# Patient Record
Sex: FEMALE | Race: WHITE | NOT HISPANIC OR LATINO | Employment: OTHER | URBAN - METROPOLITAN AREA
[De-identification: names, ages, dates, MRNs, and addresses within clinical notes are randomized per-mention and may not be internally consistent; named-entity substitution may affect disease eponyms.]

---

## 2018-10-05 ENCOUNTER — TELEPHONE (OUTPATIENT)
Dept: SURGERY | Facility: CLINIC | Age: 78
End: 2018-10-05

## 2018-10-09 ENCOUNTER — PREP FOR SURGERY (OUTPATIENT)
Dept: OTHER | Facility: HOSPITAL | Age: 78
End: 2018-10-09

## 2018-10-09 DIAGNOSIS — Z12.11 SCREENING FOR COLON CANCER: Primary | ICD-10-CM

## 2018-10-10 PROBLEM — Z12.11 SCREENING FOR COLON CANCER: Status: ACTIVE | Noted: 2018-10-10

## 2018-10-10 NOTE — TELEPHONE ENCOUNTER
SCHEDULED AT [facility] 12/21/2018 AT 9:45AM - ARRIVE 8:30AM.  WILL MAIL INSTRUCTIONS.

## 2018-10-14 ENCOUNTER — PREP FOR SURGERY (OUTPATIENT)
Dept: OTHER | Facility: HOSPITAL | Age: 78
End: 2018-10-14

## 2018-10-14 DIAGNOSIS — Z12.11 SCREENING FOR COLON CANCER: Primary | ICD-10-CM

## 2018-11-08 ENCOUNTER — ANESTHESIA EVENT (OUTPATIENT)
Dept: PERIOP | Facility: HOSPITAL | Age: 78
End: 2018-11-08

## 2018-11-08 RX ORDER — SIMVASTATIN 40 MG
40 TABLET ORAL NIGHTLY
COMMUNITY
End: 2023-03-20 | Stop reason: RX

## 2018-11-08 RX ORDER — RANITIDINE 150 MG/1
150 TABLET ORAL DAILY
COMMUNITY
End: 2022-12-09

## 2018-11-08 RX ORDER — CITALOPRAM 20 MG/1
20 TABLET ORAL DAILY
COMMUNITY

## 2018-11-08 RX ORDER — LISINOPRIL AND HYDROCHLOROTHIAZIDE 20; 12.5 MG/1; MG/1
1 TABLET ORAL DAILY
COMMUNITY
End: 2022-04-25 | Stop reason: HOSPADM

## 2018-11-09 ENCOUNTER — ANESTHESIA (OUTPATIENT)
Dept: PERIOP | Facility: HOSPITAL | Age: 78
End: 2018-11-09

## 2018-11-09 ENCOUNTER — HOSPITAL ENCOUNTER (OUTPATIENT)
Facility: HOSPITAL | Age: 78
Setting detail: HOSPITAL OUTPATIENT SURGERY
Discharge: HOME | End: 2018-11-09
Attending: INTERNAL MEDICINE | Admitting: INTERNAL MEDICINE

## 2018-11-09 VITALS
BODY MASS INDEX: 24.24 KG/M2 | TEMPERATURE: 97.5 F | RESPIRATION RATE: 15 BRPM | DIASTOLIC BLOOD PRESSURE: 70 MMHG | SYSTOLIC BLOOD PRESSURE: 128 MMHG | WEIGHT: 136.8 LBS | HEIGHT: 63 IN | HEART RATE: 62 BPM | OXYGEN SATURATION: 95 %

## 2018-11-09 DIAGNOSIS — Z12.11 SCREENING FOR COLON CANCER: ICD-10-CM

## 2018-11-09 LAB — POTASSIUM BLD-SCNC: 3.8 MMOL/L (ref 3.5–5.2)

## 2018-11-09 PROCEDURE — 45380 COLONOSCOPY AND BIOPSY: CPT | Performed by: INTERNAL MEDICINE

## 2018-11-09 PROCEDURE — 88305 TISSUE EXAM BY PATHOLOGIST: CPT | Performed by: INTERNAL MEDICINE

## 2018-11-09 PROCEDURE — 84132 ASSAY OF SERUM POTASSIUM: CPT | Performed by: INTERNAL MEDICINE

## 2018-11-09 PROCEDURE — 25010000002 PROPOFOL 10 MG/ML EMULSION: Performed by: NURSE ANESTHETIST, CERTIFIED REGISTERED

## 2018-11-09 RX ORDER — MAGNESIUM HYDROXIDE 1200 MG/15ML
LIQUID ORAL AS NEEDED
Status: DISCONTINUED | OUTPATIENT
Start: 2018-11-09 | End: 2018-11-09 | Stop reason: HOSPADM

## 2018-11-09 RX ORDER — SODIUM CHLORIDE 0.9 % (FLUSH) 0.9 %
1-10 SYRINGE (ML) INJECTION AS NEEDED
Status: DISCONTINUED | OUTPATIENT
Start: 2018-11-09 | End: 2018-11-09 | Stop reason: HOSPADM

## 2018-11-09 RX ORDER — LIDOCAINE HYDROCHLORIDE 20 MG/ML
INJECTION, SOLUTION INFILTRATION; PERINEURAL AS NEEDED
Status: DISCONTINUED | OUTPATIENT
Start: 2018-11-09 | End: 2018-11-09 | Stop reason: SURG

## 2018-11-09 RX ORDER — ONDANSETRON 2 MG/ML
4 INJECTION INTRAMUSCULAR; INTRAVENOUS ONCE AS NEEDED
Status: CANCELLED | OUTPATIENT
Start: 2018-11-09

## 2018-11-09 RX ORDER — SODIUM CHLORIDE 9 MG/ML
40 INJECTION, SOLUTION INTRAVENOUS AS NEEDED
Status: DISCONTINUED | OUTPATIENT
Start: 2018-11-09 | End: 2018-11-09 | Stop reason: HOSPADM

## 2018-11-09 RX ORDER — LIDOCAINE HYDROCHLORIDE 10 MG/ML
0.5 INJECTION, SOLUTION EPIDURAL; INFILTRATION; INTRACAUDAL; PERINEURAL ONCE AS NEEDED
Status: DISCONTINUED | OUTPATIENT
Start: 2018-11-09 | End: 2018-11-09 | Stop reason: HOSPADM

## 2018-11-09 RX ORDER — SODIUM CHLORIDE, SODIUM LACTATE, POTASSIUM CHLORIDE, CALCIUM CHLORIDE 600; 310; 30; 20 MG/100ML; MG/100ML; MG/100ML; MG/100ML
100 INJECTION, SOLUTION INTRAVENOUS CONTINUOUS
Status: CANCELLED | OUTPATIENT
Start: 2018-11-09

## 2018-11-09 RX ORDER — SODIUM CHLORIDE, SODIUM LACTATE, POTASSIUM CHLORIDE, CALCIUM CHLORIDE 600; 310; 30; 20 MG/100ML; MG/100ML; MG/100ML; MG/100ML
9 INJECTION, SOLUTION INTRAVENOUS CONTINUOUS
Status: DISCONTINUED | OUTPATIENT
Start: 2018-11-09 | End: 2018-11-09 | Stop reason: HOSPADM

## 2018-11-09 RX ORDER — PROPOFOL 10 MG/ML
VIAL (ML) INTRAVENOUS AS NEEDED
Status: DISCONTINUED | OUTPATIENT
Start: 2018-11-09 | End: 2018-11-09 | Stop reason: SURG

## 2018-11-09 RX ORDER — SODIUM CHLORIDE 0.9 % (FLUSH) 0.9 %
3 SYRINGE (ML) INJECTION EVERY 12 HOURS SCHEDULED
Status: DISCONTINUED | OUTPATIENT
Start: 2018-11-09 | End: 2018-11-09 | Stop reason: HOSPADM

## 2018-11-09 RX ADMIN — PROPOFOL 50 MG: 10 INJECTION, EMULSION INTRAVENOUS at 08:37

## 2018-11-09 RX ADMIN — PROPOFOL 50 MG: 10 INJECTION, EMULSION INTRAVENOUS at 08:46

## 2018-11-09 RX ADMIN — SODIUM CHLORIDE, POTASSIUM CHLORIDE, SODIUM LACTATE AND CALCIUM CHLORIDE: 600; 310; 30; 20 INJECTION, SOLUTION INTRAVENOUS at 08:31

## 2018-11-09 RX ADMIN — LIDOCAINE HYDROCHLORIDE 100 MG: 20 INJECTION, SOLUTION INFILTRATION; PERINEURAL at 08:32

## 2018-11-09 RX ADMIN — PROPOFOL 100 MG: 10 INJECTION, EMULSION INTRAVENOUS at 08:34

## 2018-11-09 RX ADMIN — PROPOFOL 50 MG: 10 INJECTION, EMULSION INTRAVENOUS at 08:40

## 2018-11-09 NOTE — BRIEF OP NOTE
COLONOSCOPY  Progress Note    Berta Garay  11/9/2018    Pre-op Diagnosis:   Screening for colon cancer [Z12.11]       Post-Op Diagnosis Codes:     * Screening for colon cancer [Z12.11]     * Colon polyp [K63.5]     * Diverticulosis [K57.90]    Procedure/CPT® Codes:      Procedure(s):  COLONOSCOPY w/ polypectomy    Surgeon(s):  Gilberto Stringer MD    Anesthesia: Monitor Anesthesia Care    Staff:   Circulator: Viviane Gillespie RN  Scrub Person: Kristin Mistry    Estimated Blood Loss: none    Urine Voided: * No values recorded between 11/9/2018  8:30 AM and 11/9/2018  8:58 AM *    Specimens:                ID Type Source Tests Collected by Time   A : cecal polyp Polyp Large Intestine, Cecum TISSUE PATHOLOGY EXAM Gilberto Stringer MD 11/9/2018 0849   B : ascending polyps X 2 Polyp Large Intestine, Right / Ascending Colon TISSUE PATHOLOGY EXAM Gilberto Stringer MD 11/9/2018 0850   C : descending polyp Polyp Large Intestine, Left / Descending Colon TISSUE PATHOLOGY EXAM Gilberto Stringer MD 11/9/2018 0853         Drains:      Findings: Colon to TI good Prep  Sigmoid Diverticulosis  Polyps (4) Cold Biopsy    Complications: None      Gilberto Stringer MD     Date: 11/9/2018  Time: 9:01 AM

## 2018-11-09 NOTE — OP NOTE
COLONOSCOPY  Procedure Report    Patient Name:  Berta Garay  YOB: 1940    Date of Surgery:  11/9/2018     Indications:  Screening for CRC    Pre-op Diagnosis:   Screening for colon cancer [Z12.11]    Post-Op Diagnosis Codes:     * Screening for colon cancer [Z12.11]     * Colon polyp [K63.5]     * Diverticulosis [K57.90]         Procedure/CPT® Codes:      Procedure(s):  COLONOSCOPY w/ polypectomy    Staff:  Surgeon(s):  Gilberto Stringer MD         Anesthesia: Monitor Anesthesia Care    Estimated Blood Loss: none    Specimens:   ID Type Source Tests Collected by Time   A : cecal polyp Polyp Large Intestine, Cecum TISSUE PATHOLOGY EXAM Gilberto Stringer MD 11/9/2018 0849   B : ascending polyps X 2 Polyp Large Intestine, Right / Ascending Colon TISSUE PATHOLOGY EXAM Gilberto Stringer MD 11/9/2018 0850   C : descending polyp Polyp Large Intestine, Left / Descending Colon TISSUE PATHOLOGY EXAM Gilberto Stringer MD 11/9/2018 0853       Implants:    Nothing was implanted during the procedure      Description of Procedure: After having signed informed consent, she was brought to the endoscopy suite and placed in left lateral decubitus position, given her IV sedation. Rectal exam revealed no external lesions, normal anal tone, no rectal mass. Scope was introduced into rectum and advanced under direct visualization through the rectum, sigmoid colon, descending colon, to and around the splenic flexure, reduced and advanced through transverse colon with minimal looping around the hepatic flexure but the scope was easily reduced in the ascending colon and then advanced using rotational technique into the cecum. Cecum was identified by appendiceal orifice and ileocecal valve. It was well prepped and normal appearing. The ileocecal valve was briefly intubated. Terminal ileum was normal appearing. Scope was withdrawn back in the ascending colon, across the ileocecal valve  with a sessile 4 x 4 mm polyp that was biopsied and sent as cecal polyp. Scope was then withdrawn into the ascending colon. There were two 3 mm polyps noted in the ascending colon, both biopsied, sent together as ascending colon polyps x2. Scope was withdrawn around the hepatic flexure through a normal appearing transverse colon, into the descending colon where a fourth 4 mm polyp was identified, biopsied, and sent separately as descending colon polyp. Scope was withdrawn through the sigmoid colon where there were scattered diverticular openings noted but no further mucosal lesions. Scope was withdrawn in the rectum, retroflexed revealing intact dentate line and no mucosal lesions. Scope was de-retroflexed and withdrawn. Patient tolerated the procedure very well.          Findings: Colon to TI good Prep  Sigmoid Diverticulosis  Polyps (4) Cold Biopsy        Complications: None    Recommendations: Results to be called      Gilberto Stringer MD     Date: 11/9/2018  Time: 9:04 AM

## 2018-11-09 NOTE — ANESTHESIA POSTPROCEDURE EVALUATION
Patient: Berta Garay    Procedure Summary     Date:  11/09/18 Room / Location:  [facility] ENDOSCOPY 1 /  [facility]    Anesthesia Start:  0831 Anesthesia Stop:  0901    Procedure:  COLONOSCOPY w/ polypectomy (N/A ) Diagnosis:       Screening for colon cancer      Colon polyp      Diverticulosis      (Screening for colon cancer [Z12.11])    Surgeon:  Gilberto Stringer MD Provider:  Neris Weiss CRNA    Anesthesia Type:  MAC ASA Status:  2          Anesthesia Type: MAC  Last vitals  BP   96/59 (11/09/18 0903)   Temp   97.5 °F (36.4 °C) (11/09/18 0903)   Pulse   60 (11/09/18 0903)   Resp   18 (11/09/18 0903)     SpO2         Post Anesthesia Care and Evaluation    Patient location during evaluation: bedside  Patient participation: complete - patient participated  Level of consciousness: awake and alert  Pain score: 0  Pain management: adequate  Airway patency: patent  Anesthetic complications: No anesthetic complications  PONV Status: none  Cardiovascular status: acceptable  Respiratory status: acceptable  Hydration status: acceptable

## 2018-11-09 NOTE — H&P
"Patient Care Team:  Josesito Lopez MD as PCP - General (Family Medicine)    CHIEF COMPLAINT: Screening for Colon Cancer    HISTORY OF PRESENT ILLNESS:    Last exam was 2008      Past Medical History:   Diagnosis Date   • GERD (gastroesophageal reflux disease)    • Hyperlipidemia    • Hypertension      Past Surgical History:   Procedure Laterality Date   • COLONOSCOPY     • ENDOSCOPY     • EYE SURGERY      Bilateral Cataracts     History reviewed. No pertinent family history.  Social History   Substance Use Topics   • Smoking status: Former Smoker     Quit date: 1968   • Smokeless tobacco: Never Used   • Alcohol use 0.6 oz/week     1 Glasses of wine per week      Comment: daily     Prescriptions Prior to Admission   Medication Sig Dispense Refill Last Dose   • citalopram (CeleXA) 20 MG tablet Take 20 mg by mouth Daily.   11/8/2018 at 0800   • lisinopril-hydrochlorothiazide (PRINZIDE,ZESTORETIC) 20-12.5 MG per tablet Take 1 tablet by mouth Daily.   11/8/2018 at 0800   • raNITIdine (ZANTAC) 150 MG tablet Take 150 mg by mouth Daily.   11/8/2018 at 0900   • simvastatin (ZOCOR) 40 MG tablet Take 40 mg by mouth Every Night.   11/8/2018 at 2100     Allergies:  Sulfa antibiotics    REVIEW OF SYSTEMS:  Please see the above history of present illness for pertinent positives and negatives.  The remainder of the patient's systems have been reviewed and are negative.     Vital Signs  Temp:  [97.9 °F (36.6 °C)] 97.9 °F (36.6 °C)  Heart Rate:  [60] 60  Resp:  [16] 16  BP: (126)/(75) 126/75    Flowsheet Rows      First Filed Value   Admission Height  160 cm (63\") Documented at 11/09/2018 0714   Admission Weight  62.1 kg (136 lb 12.8 oz) Documented at 11/09/2018 0706           Physical Exam:  Physical Exam   Constitutional: Patient appears well-developed and well-nourished and in no acute distress   HEENT:   Head: Normocephalic and atraumatic.   Eyes:  Pupils are equal, round, and reactive to light. EOM are intact. Sclera are " anicteric and non-injected.  Mouth and Throat: Patient has moist mucous membranes. Oropharynx is clear of any erythema or exudate.     Neck: Neck supple. No JVD present. No thyromegaly present. No lymphadenopathy present.  Cardiovascular: Regular rate, regular rhythm, S1 normal and S2 normal.  Exam reveals no gallop and no friction rub.  No murmur heard.  Pulmonary/Chest: Lungs are clear to auscultation bilaterally. No respiratory distress. No wheezes. No rhonchi. No rales.   Abdominal: Soft. Bowel sounds are normal. No distension and no mass. There is no hepatosplenomegaly. There is no tenderness.   Musculoskeletal: Normal Muscle tone  Extremities: No edema. Pulses are palpable in all 4 extremities.  Neurological: Patient is alert and oriented to person, place, and time. Cranial nerves II-XII are grossly intact with no focal deficits.  Skin: Skin is warm. No rash noted. Nails show no clubbing.  No cyanosis or erythema.    Debilities/Disabilities Identified: None  Emotional Behavior: Appropriate     Results Review:    I reviewed the patient's new clinical results.  Lab Results (most recent)     Procedure Component Value Units Date/Time    Potassium [[id number]]  (Normal) Collected:  11/09/18 0719    Specimen:  Blood Updated:  11/09/18 0747     Potassium 3.8 mmol/L           Imaging Results (most recent)     None        reviewed    ECG/EMG Results (most recent)     None        reviewed    Assessment/Plan     Screening for CRC/ Colonoscopy    I discussed the patients findings and my recommendations with patient.     Gilberto Stringer MD  11/09/18  8:29 AM    Time: 10 min prior to procedure.

## 2018-11-09 NOTE — ANESTHESIA PREPROCEDURE EVALUATION
Anesthesia Evaluation     Patient summary reviewed and Nursing notes reviewed   no history of anesthetic complications:  NPO Solid Status: > 8 hours  NPO Liquid Status: > 8 hours           Airway   Mallampati: II  TM distance: >3 FB  Neck ROM: full  No difficulty expected  Dental      Pulmonary     breath sounds clear to auscultation  Cardiovascular   Exercise tolerance: good (4-7 METS)    Rhythm: regular  Rate: normal    (+) hypertension well controlled less than 2 medications, hyperlipidemia,       Neuro/Psych- negative ROS  GI/Hepatic/Renal/Endo    (+)  GERD well controlled,      Musculoskeletal (-) negative ROS    Abdominal    Substance History   (+) alcohol use (1 glass every day),      OB/GYN          Other - negative ROS                       Anesthesia Plan    ASA 2     MAC     intravenous induction   Anesthetic plan, all risks, benefits, and alternatives have been provided, discussed and informed consent has been obtained with: patient.  Use of blood products discussed with patient  Consented to blood products.

## 2018-11-12 LAB
CYTO UR: NORMAL
LAB AP CASE REPORT: NORMAL
PATH REPORT.FINAL DX SPEC: NORMAL
PATH REPORT.GROSS SPEC: NORMAL

## 2021-03-18 ENCOUNTER — TRANSCRIBE ORDERS (OUTPATIENT)
Dept: ADMINISTRATIVE | Facility: HOSPITAL | Age: 81
End: 2021-03-18

## 2021-03-18 DIAGNOSIS — I10 HYPERTENSION, UNSPECIFIED TYPE: ICD-10-CM

## 2021-03-18 DIAGNOSIS — R55 NEAR SYNCOPE: Primary | ICD-10-CM

## 2021-04-01 ENCOUNTER — HOSPITAL ENCOUNTER (OUTPATIENT)
Dept: ULTRASOUND IMAGING | Facility: HOSPITAL | Age: 81
Discharge: HOME | End: 2021-04-01
Admitting: FAMILY MEDICINE

## 2021-04-01 DIAGNOSIS — R55 NEAR SYNCOPE: ICD-10-CM

## 2021-04-01 DIAGNOSIS — I10 HYPERTENSION, UNSPECIFIED TYPE: ICD-10-CM

## 2021-04-01 PROCEDURE — 93880 EXTRACRANIAL BILAT STUDY: CPT

## 2021-04-05 ENCOUNTER — TELEPHONE (OUTPATIENT)
Dept: GASTROENTEROLOGY | Facility: CLINIC | Age: 81
End: 2021-04-05

## 2021-04-06 ENCOUNTER — PREP FOR SURGERY (OUTPATIENT)
Dept: OTHER | Facility: HOSPITAL | Age: 81
End: 2021-04-06

## 2021-04-06 DIAGNOSIS — Z12.11 ENCOUNTER FOR SCREENING FOR MALIGNANT NEOPLASM OF COLON: Primary | ICD-10-CM

## 2021-04-06 DIAGNOSIS — Z86.010 PERSONAL HISTORY OF COLONIC POLYPS: ICD-10-CM

## 2021-04-08 PROBLEM — Z86.0100 PERSONAL HISTORY OF COLONIC POLYPS: Status: ACTIVE | Noted: 2021-04-08

## 2021-04-08 PROBLEM — Z12.11 ENCOUNTER FOR SCREENING FOR MALIGNANT NEOPLASM OF COLON: Status: ACTIVE | Noted: 2021-04-08

## 2021-04-08 PROBLEM — Z86.010 PERSONAL HISTORY OF COLONIC POLYPS: Status: ACTIVE | Noted: 2021-04-08

## 2021-04-08 NOTE — TELEPHONE ENCOUNTER
was here for appointment with Dr. Stringer.  Scheduled at [place] on 11/15/2021 at 8am - arrive 7am.  Will check with patient and call me back.  Gave him card with my name and phone number.    COVID TEST on 11/12/2021, will call with time.  Need to self quarantine until after procedure.  She understands.

## 2021-11-10 DIAGNOSIS — Z20.822 ENCOUNTER FOR PREOPERATIVE SCREENING LABORATORY TESTING FOR COVID-19 VIRUS: Primary | ICD-10-CM

## 2021-11-10 DIAGNOSIS — Z01.818 OTHER SPECIFIED PRE-OPERATIVE EXAMINATION: Primary | ICD-10-CM

## 2021-11-10 DIAGNOSIS — Z01.812 ENCOUNTER FOR PREOPERATIVE SCREENING LABORATORY TESTING FOR COVID-19 VIRUS: Primary | ICD-10-CM

## 2021-11-12 ENCOUNTER — ANESTHESIA EVENT (OUTPATIENT)
Dept: PERIOP | Facility: HOSPITAL | Age: 81
End: 2021-11-12

## 2021-11-12 ENCOUNTER — LAB (OUTPATIENT)
Dept: LAB | Facility: HOSPITAL | Age: 81
End: 2021-11-12

## 2021-11-12 DIAGNOSIS — Z01.812 ENCOUNTER FOR PREOPERATIVE SCREENING LABORATORY TESTING FOR COVID-19 VIRUS: ICD-10-CM

## 2021-11-12 DIAGNOSIS — Z20.822 ENCOUNTER FOR PREOPERATIVE SCREENING LABORATORY TESTING FOR COVID-19 VIRUS: ICD-10-CM

## 2021-11-12 LAB — SARS-COV-2 RNA PNL SPEC NAA+PROBE: NOT DETECTED

## 2021-11-12 PROCEDURE — C9803 HOPD COVID-19 SPEC COLLECT: HCPCS

## 2021-11-12 PROCEDURE — 87635 SARS-COV-2 COVID-19 AMP PRB: CPT | Performed by: INTERNAL MEDICINE

## 2021-11-15 ENCOUNTER — HOSPITAL ENCOUNTER (OUTPATIENT)
Facility: HOSPITAL | Age: 81
Setting detail: HOSPITAL OUTPATIENT SURGERY
Discharge: HOME | End: 2021-11-15
Attending: INTERNAL MEDICINE | Admitting: INTERNAL MEDICINE

## 2021-11-15 ENCOUNTER — ANESTHESIA (OUTPATIENT)
Dept: PERIOP | Facility: HOSPITAL | Age: 81
End: 2021-11-15

## 2021-11-15 VITALS
SYSTOLIC BLOOD PRESSURE: 121 MMHG | HEART RATE: 65 BPM | WEIGHT: 138 LBS | OXYGEN SATURATION: 98 % | DIASTOLIC BLOOD PRESSURE: 72 MMHG | TEMPERATURE: 98.2 F | BODY MASS INDEX: 24.45 KG/M2 | RESPIRATION RATE: 16 BRPM

## 2021-11-15 DIAGNOSIS — Z12.11 ENCOUNTER FOR SCREENING FOR MALIGNANT NEOPLASM OF COLON: ICD-10-CM

## 2021-11-15 DIAGNOSIS — Z86.010 PERSONAL HISTORY OF COLONIC POLYPS: ICD-10-CM

## 2021-11-15 PROCEDURE — 45380 COLONOSCOPY AND BIOPSY: CPT | Performed by: INTERNAL MEDICINE

## 2021-11-15 PROCEDURE — 88305 TISSUE EXAM BY PATHOLOGIST: CPT | Performed by: INTERNAL MEDICINE

## 2021-11-15 PROCEDURE — 25010000002 PROPOFOL 10 MG/ML EMULSION: Performed by: NURSE ANESTHETIST, CERTIFIED REGISTERED

## 2021-11-15 RX ORDER — LIDOCAINE HYDROCHLORIDE 10 MG/ML
0.5 INJECTION, SOLUTION EPIDURAL; INFILTRATION; INTRACAUDAL; PERINEURAL ONCE AS NEEDED
Status: DISCONTINUED | OUTPATIENT
Start: 2021-11-15 | End: 2021-11-15 | Stop reason: HOSPADM

## 2021-11-15 RX ORDER — SODIUM CHLORIDE, SODIUM LACTATE, POTASSIUM CHLORIDE, CALCIUM CHLORIDE 600; 310; 30; 20 MG/100ML; MG/100ML; MG/100ML; MG/100ML
9 INJECTION, SOLUTION INTRAVENOUS CONTINUOUS
Status: DISCONTINUED | OUTPATIENT
Start: 2021-11-15 | End: 2021-11-15 | Stop reason: HOSPADM

## 2021-11-15 RX ORDER — SODIUM CHLORIDE, SODIUM LACTATE, POTASSIUM CHLORIDE, CALCIUM CHLORIDE 600; 310; 30; 20 MG/100ML; MG/100ML; MG/100ML; MG/100ML
100 INJECTION, SOLUTION INTRAVENOUS CONTINUOUS
Status: DISCONTINUED | OUTPATIENT
Start: 2021-11-15 | End: 2021-11-15 | Stop reason: HOSPADM

## 2021-11-15 RX ORDER — SODIUM CHLORIDE 9 MG/ML
40 INJECTION, SOLUTION INTRAVENOUS AS NEEDED
Status: DISCONTINUED | OUTPATIENT
Start: 2021-11-15 | End: 2021-11-15 | Stop reason: HOSPADM

## 2021-11-15 RX ORDER — SODIUM CHLORIDE 0.9 % (FLUSH) 0.9 %
10 SYRINGE (ML) INJECTION AS NEEDED
Status: DISCONTINUED | OUTPATIENT
Start: 2021-11-15 | End: 2021-11-15 | Stop reason: HOSPADM

## 2021-11-15 RX ORDER — LIDOCAINE HYDROCHLORIDE 20 MG/ML
INJECTION, SOLUTION INFILTRATION; PERINEURAL AS NEEDED
Status: DISCONTINUED | OUTPATIENT
Start: 2021-11-15 | End: 2021-11-15 | Stop reason: SURG

## 2021-11-15 RX ORDER — MAGNESIUM HYDROXIDE 1200 MG/15ML
LIQUID ORAL AS NEEDED
Status: DISCONTINUED | OUTPATIENT
Start: 2021-11-15 | End: 2021-11-15 | Stop reason: HOSPADM

## 2021-11-15 RX ORDER — ONDANSETRON 2 MG/ML
4 INJECTION INTRAMUSCULAR; INTRAVENOUS ONCE AS NEEDED
Status: DISCONTINUED | OUTPATIENT
Start: 2021-11-15 | End: 2021-11-15 | Stop reason: HOSPADM

## 2021-11-15 RX ORDER — PROPOFOL 10 MG/ML
VIAL (ML) INTRAVENOUS AS NEEDED
Status: DISCONTINUED | OUTPATIENT
Start: 2021-11-15 | End: 2021-11-15 | Stop reason: SURG

## 2021-11-15 RX ORDER — SODIUM CHLORIDE 0.9 % (FLUSH) 0.9 %
10 SYRINGE (ML) INJECTION EVERY 12 HOURS SCHEDULED
Status: DISCONTINUED | OUTPATIENT
Start: 2021-11-15 | End: 2021-11-15 | Stop reason: HOSPADM

## 2021-11-15 RX ADMIN — SODIUM CHLORIDE, POTASSIUM CHLORIDE, SODIUM LACTATE AND CALCIUM CHLORIDE 9 ML/HR: 600; 310; 30; 20 INJECTION, SOLUTION INTRAVENOUS at 07:29

## 2021-11-15 RX ADMIN — PROPOFOL 50 MG: 10 INJECTION, EMULSION INTRAVENOUS at 08:19

## 2021-11-15 RX ADMIN — PROPOFOL 50 MG: 10 INJECTION, EMULSION INTRAVENOUS at 08:28

## 2021-11-15 RX ADMIN — PROPOFOL 50 MG: 10 INJECTION, EMULSION INTRAVENOUS at 08:16

## 2021-11-15 RX ADMIN — PROPOFOL 50 MG: 10 INJECTION, EMULSION INTRAVENOUS at 08:33

## 2021-11-15 RX ADMIN — PROPOFOL 50 MG: 10 INJECTION, EMULSION INTRAVENOUS at 08:13

## 2021-11-15 RX ADMIN — PROPOFOL 50 MG: 10 INJECTION, EMULSION INTRAVENOUS at 08:22

## 2021-11-15 RX ADMIN — LIDOCAINE HYDROCHLORIDE 100 MG: 20 INJECTION, SOLUTION INFILTRATION; PERINEURAL at 08:09

## 2021-11-15 RX ADMIN — PROPOFOL 100 MG: 10 INJECTION, EMULSION INTRAVENOUS at 08:10

## 2021-11-15 NOTE — ANESTHESIA PREPROCEDURE EVALUATION
Anesthesia Evaluation     Patient summary reviewed and Nursing notes reviewed   no history of anesthetic complications:  NPO Solid Status: > 8 hours  NPO Liquid Status: > 8 hours           Airway   Mallampati: II  TM distance: >3 FB  Neck ROM: full  No difficulty expected  Dental      Pulmonary - negative pulmonary ROS    breath sounds clear to auscultation  Cardiovascular   Exercise tolerance: good (4-7 METS)    Rhythm: regular  Rate: normal    (+) hypertension well controlled less than 2 medications, hyperlipidemia,       Neuro/Psych- negative ROS  GI/Hepatic/Renal/Endo    (+)  GERD well controlled,      Musculoskeletal (-) negative ROS    Abdominal    Substance History   (+) alcohol use (3 drinks per week),      OB/GYN          Other - negative ROS                       Anesthesia Plan    ASA 2     MAC     intravenous induction     Anesthetic plan, all risks, benefits, and alternatives have been provided, discussed and informed consent has been obtained with: patient.  Use of blood products discussed with patient  Consented to blood products.

## 2021-11-15 NOTE — H&P
Patient Care Team:  Josesito Lopez MD as PCP - General (Family Medicine)    CHIEF COMPLAINT: Personal hx colon polyps    HISTORY OF PRESENT ILLNESS:  Last exam 2018    Past Medical History:   Diagnosis Date   • Colon polyp    • GERD (gastroesophageal reflux disease)    • Hyperlipidemia    • Hypertension      Past Surgical History:   Procedure Laterality Date   • COLONOSCOPY     • COLONOSCOPY N/A 2018    Procedure: COLONOSCOPY w/ polypectomy;  Surgeon: Gilberto Stringer MD;  Location: [facility];  Service: Gastroenterology   • ENDOSCOPY     • EYE SURGERY      Bilateral Cataracts     History reviewed. No pertinent family history.  Social History     Tobacco Use   • Smoking status: Former Smoker     Quit date:      Years since quittin.9   • Smokeless tobacco: Never Used   Substance Use Topics   • Alcohol use: Yes     Alcohol/week: 1.0 standard drink     Types: 1 Glasses of wine per week     Comment: daily   • Drug use: No     Medications Prior to Admission   Medication Sig Dispense Refill Last Dose   • citalopram (CeleXA) 20 MG tablet Take 20 mg by mouth Daily.   2021 at Unknown time   • lisinopril-hydrochlorothiazide (PRINZIDE,ZESTORETIC) 20-12.5 MG per tablet Take 1 tablet by mouth Daily.   2021 at Unknown time   • raNITIdine (ZANTAC) 150 MG tablet Take 150 mg by mouth Daily.   2021 at Unknown time   • simvastatin (ZOCOR) 40 MG tablet Take 40 mg by mouth Every Night.   2021 at Unknown time     Allergies:  Sulfa antibiotics    REVIEW OF SYSTEMS:  Please see the above history of present illness for pertinent positives and negatives.  The remainder of the patient's systems have been reviewed and are negative.     Vital Signs  Temp:  [98.5 °F (36.9 °C)] 98.5 °F (36.9 °C)  Heart Rate:  [57] 57  Resp:  [21] 21  BP: (147)/(79) 147/79    Flowsheet Rows      First Filed Value   Admission Height --   Admission Weight 62.6 kg (138 lb) Documented at 11/15/2021 0700            Physical Exam:  Physical Exam   Constitutional: Patient appears well-developed and well-nourished and in no acute distress   HEENT:   Head: Normocephalic and atraumatic.   Eyes:  Pupils are equal, round, and reactive to light. EOM are intact. Sclerae are anicteric and non-injected.  Mouth and Throat: Patient has moist mucous membranes. Oropharynx is clear of any erythema or exudate.     Neck: Neck supple. No JVD present. No thyromegaly present. No lymphadenopathy present.  Cardiovascular: Regular rate, regular rhythm, S1 normal and S2 normal.  Exam reveals no gallop and no friction rub.  No murmur heard.  Pulmonary/Chest: Lungs are clear to auscultation bilaterally. No respiratory distress. No wheezes. No rhonchi. No rales.   Abdominal: Soft. Bowel sounds are normal. No distension and no mass. There is no hepatosplenomegaly. There is no tenderness.   Musculoskeletal: Normal Muscle tone  Extremities: No edema. Pulses are palpable in all 4 extremities.  Neurological: Patient is alert and oriented to person, place, and time. Cranial nerves II-XII are grossly intact with no focal deficits.  Skin: Skin is warm. No rash noted. Nails show no clubbing.  No cyanosis or erythema.    Debilities/Disabilities Identified: None  Emotional Behavior: Appropriate     Results Review:   I reviewed the patient's new clinical results.    Lab Results (most recent)     None          Imaging Results (Most Recent)     None        reviewed    ECG/EMG Results (most recent)     None        reviewed    Assessment/Plan   Personal hx colon polyps/  colonoscopy      I discussed the patient's findings and my recommendations with patient.     Gilberto Stringer MD  11/15/21  08:08 EST    Time: 10 min prior to procedure.

## 2021-11-15 NOTE — BRIEF OP NOTE
COLONOSCOPY  Progress Note    Berta Garay  11/15/2021    Pre-op Diagnosis:   Encounter for screening for malignant neoplasm of colon [Z12.11]  Personal history of colonic polyps [Z86.010]       Post-Op Diagnosis Codes:     * Encounter for screening for malignant neoplasm of colon [Z12.11]     * Personal history of colonic polyps [Z86.010]     * Diverticulosis [K57.90]     * Colon polyp [K63.5]    Procedure/CPT® Codes:        Procedure(s):  COLONOSCOPY; POLYPECTOMY    Surgeon(s):  Gilberto Stringer MD    Anesthesia: Monitored Anesthesia Care    Staff:   Circulator: Kristin Putnam RN  Scrub Person: Kristin Mistry         Estimated Blood Loss: none    Urine Voided: * No values recorded between 11/15/2021  8:06 AM and 11/15/2021  8:37 AM *    Specimens:                Specimens     ID Source Type Tests Collected By Collected At Frozen?    A Large Intestine, Cecum Polyp · TISSUE PATHOLOGY EXAM   Gilberto Stringer MD 11/15/21 0827                 Drains: * No LDAs found *    Findings: Colon to Cecum Good Prep  Sigmoid Diverticulosis  Polyp-Biopsy    Complications: none          Gilberto Stringer MD     Date: 11/15/2021  Time: 08:39 EST

## 2021-11-15 NOTE — ANESTHESIA POSTPROCEDURE EVALUATION
Patient: Berta Garay    Procedure Summary     Date: 11/15/21 Room / Location: [facility] ENDOSCOPY 1 /  [facility]    Anesthesia Start: 0807 Anesthesia Stop: 0842    Procedure: COLONOSCOPY; POLYPECTOMY (N/A ) Diagnosis:       Encounter for screening for malignant neoplasm of colon      Personal history of colonic polyps      Diverticulosis      Colon polyp      (Encounter for screening for malignant neoplasm of colon [Z12.11])      (Personal history of colonic polyps [Z86.010])    Surgeons: Gilberto Stringer MD Provider: Neris Weiss CRNA    Anesthesia Type: MAC ASA Status: 2          Anesthesia Type: MAC    Vitals  Vitals Value Taken Time   BP 77/46 11/15/21 0842   Temp 98.2 °F (36.8 °C) 11/15/21 0842   Pulse 58 11/15/21 0842   Resp 12 11/15/21 0842   SpO2 97 % 11/15/21 0842           Post Anesthesia Care and Evaluation    Patient location during evaluation: PHASE II  Patient participation: complete - patient participated  Level of consciousness: awake  Pain score: 0  Pain management: adequate  Airway patency: patent  Anesthetic complications: No anesthetic complications  PONV Status: none  Cardiovascular status: acceptable  Respiratory status: acceptable  Hydration status: acceptable

## 2021-11-16 LAB
LAB AP CASE REPORT: NORMAL
PATH REPORT.FINAL DX SPEC: NORMAL
PATH REPORT.GROSS SPEC: NORMAL

## 2022-03-21 ENCOUNTER — TRANSCRIBE ORDERS (OUTPATIENT)
Dept: ADMINISTRATIVE | Facility: HOSPITAL | Age: 82
End: 2022-03-21

## 2022-03-21 DIAGNOSIS — R01.1 UNDIAGNOSED CARDIAC MURMURS: Primary | ICD-10-CM

## 2022-03-29 ENCOUNTER — HOSPITAL ENCOUNTER (OUTPATIENT)
Dept: CARDIOLOGY | Facility: HOSPITAL | Age: 82
Discharge: HOME | End: 2022-03-29
Admitting: FAMILY MEDICINE

## 2022-03-29 VITALS
DIASTOLIC BLOOD PRESSURE: 81 MMHG | HEART RATE: 56 BPM | BODY MASS INDEX: 24.22 KG/M2 | HEIGHT: 63 IN | WEIGHT: 136.69 LBS | SYSTOLIC BLOOD PRESSURE: 165 MMHG

## 2022-03-29 DIAGNOSIS — R01.1 UNDIAGNOSED CARDIAC MURMURS: ICD-10-CM

## 2022-03-29 LAB
AORTIC DIMENSIONLESS INDEX: 0.62 (DI)
ASCENDING AORTA: 3.9 CM
BH CV ECHO MEAS - AO MAX PG: 19.4 MMHG
BH CV ECHO MEAS - AO MEAN PG: 9.9 MMHG
BH CV ECHO MEAS - AO V2 MAX: 220.2 CM/SEC
BH CV ECHO MEAS - AO V2 VTI: 52.9 CM
BH CV ECHO MEAS - AVA(I,D): 1.38 CM2
BH CV ECHO MEAS - EDV(CUBED): 76.3 ML
BH CV ECHO MEAS - EDV(MOD-SP2): 44.3 ML
BH CV ECHO MEAS - EDV(MOD-SP4): 59.1 ML
BH CV ECHO MEAS - EF(MOD-BP): 63.7 %
BH CV ECHO MEAS - EF(MOD-SP2): 71.8 %
BH CV ECHO MEAS - EF(MOD-SP4): 59.2 %
BH CV ECHO MEAS - ESV(CUBED): 23.4 ML
BH CV ECHO MEAS - ESV(MOD-SP2): 12.5 ML
BH CV ECHO MEAS - ESV(MOD-SP4): 24.1 ML
BH CV ECHO MEAS - FS: 32.6 %
BH CV ECHO MEAS - IVS/LVPW: 1.39 CM
BH CV ECHO MEAS - IVSD: 1.29 CM
BH CV ECHO MEAS - LAT PEAK E' VEL: 7.5 CM/SEC
BH CV ECHO MEAS - LV DIASTOLIC VOL/BSA (35-75): 35.9 CM2
BH CV ECHO MEAS - LV MASS(C)D: 161.9 GRAMS
BH CV ECHO MEAS - LV MAX PG: 7.3 MMHG
BH CV ECHO MEAS - LV MEAN PG: 3.6 MMHG
BH CV ECHO MEAS - LV SYSTOLIC VOL/BSA (12-30): 14.7 CM2
BH CV ECHO MEAS - LV V1 MAX: 135.3 CM/SEC
BH CV ECHO MEAS - LV V1 VTI: 32.9 CM
BH CV ECHO MEAS - LVIDD: 4.2 CM
BH CV ECHO MEAS - LVIDS: 2.9 CM
BH CV ECHO MEAS - LVOT AREA: 2.22 CM2
BH CV ECHO MEAS - LVOT DIAM: 1.68 CM
BH CV ECHO MEAS - LVPWD: 0.93 CM
BH CV ECHO MEAS - MED PEAK E' VEL: 4.5 CM/SEC
BH CV ECHO MEAS - MR MEAN PG: 105.3 MMHG
BH CV ECHO MEAS - MR MEAN VEL: 490.8 CM/SEC
BH CV ECHO MEAS - MR VTI: 238.5 CM
BH CV ECHO MEAS - MV A DUR: 0.14 SEC
BH CV ECHO MEAS - MV A MAX VEL: 90 CM/SEC
BH CV ECHO MEAS - MV DEC SLOPE: 804.1 CM/SEC2
BH CV ECHO MEAS - MV DEC TIME: 156 MSEC
BH CV ECHO MEAS - MV E MAX VEL: 126 CM/SEC
BH CV ECHO MEAS - MV E/A: 1.4
BH CV ECHO MEAS - MV MEAN PG: 1.82 MMHG
BH CV ECHO MEAS - MV V2 VTI: 41 CM
BH CV ECHO MEAS - MVA(VTI): 1.77 CM2
BH CV ECHO MEAS - PA ACC TIME: 0.15 SEC
BH CV ECHO MEAS - PA PR(ACCEL): 13.6 MMHG
BH CV ECHO MEAS - RAP SYSTOLE: 3 MMHG
BH CV ECHO MEAS - RV V1 VTI: 13.9 CM
BH CV ECHO MEAS - RVOT DIAM: 2.11 CM
BH CV ECHO MEAS - RVSP: 27 MMHG
BH CV ECHO MEAS - SI(MOD-SP2): 19.3 ML/M2
BH CV ECHO MEAS - SI(MOD-SP4): 21.3 ML/M2
BH CV ECHO MEAS - SV(LVOT): 72.8 ML
BH CV ECHO MEAS - SV(MOD-SP2): 31.8 ML
BH CV ECHO MEAS - SV(MOD-SP4): 35 ML
BH CV ECHO MEAS - SV(RVOT): 48.3 ML
BH CV ECHO MEAS - TAPSE (>1.6): 2.17 CM
BH CV ECHO MEAS - TR MAX PG: 29 MMHG
BH CV ECHO MEAS - TR MAX VEL: 254.1 CM/SEC
BH CV ECHO MEASUREMENTS AVERAGE E/E' RATIO: 21
BH CV XLRA - RV BASE: 3.3 CM
BH CV XLRA - RV LENGTH: 6 CM
BH CV XLRA - RV MID: 2.7 CM
BH CV XLRA - TDI S': 14.9 CM/SEC
LEFT ATRIUM VOLUME INDEX: 37 ML/M2
MAXIMAL PREDICTED HEART RATE: 139 BPM
STRESS TARGET HR: 118 BPM

## 2022-03-29 PROCEDURE — 93306 TTE W/DOPPLER COMPLETE: CPT

## 2022-03-29 PROCEDURE — 93306 TTE W/DOPPLER COMPLETE: CPT | Performed by: INTERNAL MEDICINE

## 2022-03-29 PROCEDURE — 25010000002 PERFLUTREN (DEFINITY) 8.476 MG IN SODIUM CHLORIDE (PF) 0.9 % 10 ML INJECTION: Performed by: FAMILY MEDICINE

## 2022-03-29 RX ADMIN — SODIUM CHLORIDE 2 ML: 9 INJECTION INTRAMUSCULAR; INTRAVENOUS; SUBCUTANEOUS at 09:48

## 2022-04-14 ENCOUNTER — APPOINTMENT (OUTPATIENT)
Dept: GENERAL RADIOLOGY | Facility: HOSPITAL | Age: 82
End: 2022-04-14

## 2022-04-14 ENCOUNTER — HOSPITAL ENCOUNTER (EMERGENCY)
Facility: HOSPITAL | Age: 82
Discharge: HOME | End: 2022-04-14
Attending: EMERGENCY MEDICINE | Admitting: EMERGENCY MEDICINE

## 2022-04-14 VITALS
HEIGHT: 63 IN | DIASTOLIC BLOOD PRESSURE: 80 MMHG | HEART RATE: 84 BPM | TEMPERATURE: 99.1 F | BODY MASS INDEX: 23.92 KG/M2 | WEIGHT: 135 LBS | OXYGEN SATURATION: 94 % | SYSTOLIC BLOOD PRESSURE: 106 MMHG | RESPIRATION RATE: 16 BRPM

## 2022-04-14 DIAGNOSIS — I48.91 ATRIAL FIBRILLATION WITH RVR: Primary | ICD-10-CM

## 2022-04-14 LAB
ALBUMIN SERPL-MCNC: 4.2 G/DL (ref 3.5–5.2)
ALBUMIN/GLOB SERPL: 1.8 G/DL
ALP SERPL-CCNC: 77 U/L (ref 39–117)
ALT SERPL W P-5'-P-CCNC: 19 U/L (ref 1–33)
ANION GAP SERPL CALCULATED.3IONS-SCNC: 9.5 MMOL/L (ref 5–15)
AST SERPL-CCNC: 20 U/L (ref 1–32)
BASOPHILS # BLD AUTO: 0.03 10*3/MM3 (ref 0–0.2)
BASOPHILS NFR BLD AUTO: 0.5 % (ref 0–1.5)
BILIRUB SERPL-MCNC: 0.3 MG/DL (ref 0–1.2)
BUN SERPL-MCNC: 19 MG/DL (ref 8–23)
BUN/CREAT SERPL: 21.6 (ref 7–25)
CALCIUM SPEC-SCNC: 9.5 MG/DL (ref 8.6–10.5)
CHLORIDE SERPL-SCNC: 103 MMOL/L (ref 98–107)
CO2 SERPL-SCNC: 23.5 MMOL/L (ref 22–29)
CREAT SERPL-MCNC: 0.88 MG/DL (ref 0.57–1)
DEPRECATED RDW RBC AUTO: 44.2 FL (ref 37–54)
EGFRCR SERPLBLD CKD-EPI 2021: 66.1 ML/MIN/1.73
EOSINOPHIL # BLD AUTO: 0.1 10*3/MM3 (ref 0–0.4)
EOSINOPHIL NFR BLD AUTO: 1.5 % (ref 0.3–6.2)
ERYTHROCYTE [DISTWIDTH] IN BLOOD BY AUTOMATED COUNT: 13.2 % (ref 12.3–15.4)
GLOBULIN UR ELPH-MCNC: 2.4 GM/DL
GLUCOSE SERPL-MCNC: 134 MG/DL (ref 65–99)
HCT VFR BLD AUTO: 42 % (ref 34–46.6)
HGB BLD-MCNC: 14.4 G/DL (ref 12–15.9)
IMM GRANULOCYTES # BLD AUTO: 0.02 10*3/MM3 (ref 0–0.05)
IMM GRANULOCYTES NFR BLD AUTO: 0.3 % (ref 0–0.5)
LYMPHOCYTES # BLD AUTO: 1.87 10*3/MM3 (ref 0.7–3.1)
LYMPHOCYTES NFR BLD AUTO: 28.6 % (ref 19.6–45.3)
MCH RBC QN AUTO: 31.2 PG (ref 26.6–33)
MCHC RBC AUTO-ENTMCNC: 34.3 G/DL (ref 31.5–35.7)
MCV RBC AUTO: 91.1 FL (ref 79–97)
MONOCYTES # BLD AUTO: 0.59 10*3/MM3 (ref 0.1–0.9)
MONOCYTES NFR BLD AUTO: 9 % (ref 5–12)
NEUTROPHILS NFR BLD AUTO: 3.92 10*3/MM3 (ref 1.7–7)
NEUTROPHILS NFR BLD AUTO: 60.1 % (ref 42.7–76)
NRBC BLD AUTO-RTO: 0 /100 WBC (ref 0–0.2)
NT-PROBNP SERPL-MCNC: 3127 PG/ML (ref 0–1800)
PLATELET # BLD AUTO: 287 10*3/MM3 (ref 140–450)
PMV BLD AUTO: 10.1 FL (ref 6–12)
POTASSIUM SERPL-SCNC: 4.1 MMOL/L (ref 3.5–5.2)
PROT SERPL-MCNC: 6.6 G/DL (ref 6–8.5)
QT INTERVAL: 280 MS
RBC # BLD AUTO: 4.61 10*6/MM3 (ref 3.77–5.28)
SODIUM SERPL-SCNC: 136 MMOL/L (ref 136–145)
TROPONIN T SERPL-MCNC: <0.01 NG/ML (ref 0–0.03)
TSH SERPL DL<=0.05 MIU/L-ACNC: 2.58 UIU/ML (ref 0.27–4.2)
WBC NRBC COR # BLD: 6.53 10*3/MM3 (ref 3.4–10.8)

## 2022-04-14 PROCEDURE — 36415 COLL VENOUS BLD VENIPUNCTURE: CPT

## 2022-04-14 PROCEDURE — 84484 ASSAY OF TROPONIN QUANT: CPT | Performed by: EMERGENCY MEDICINE

## 2022-04-14 PROCEDURE — 83880 ASSAY OF NATRIURETIC PEPTIDE: CPT | Performed by: EMERGENCY MEDICINE

## 2022-04-14 PROCEDURE — 93005 ELECTROCARDIOGRAM TRACING: CPT

## 2022-04-14 PROCEDURE — 99284 EMERGENCY DEPT VISIT MOD MDM: CPT

## 2022-04-14 PROCEDURE — 99283 EMERGENCY DEPT VISIT LOW MDM: CPT | Performed by: EMERGENCY MEDICINE

## 2022-04-14 PROCEDURE — 80053 COMPREHEN METABOLIC PANEL: CPT | Performed by: EMERGENCY MEDICINE

## 2022-04-14 PROCEDURE — 84443 ASSAY THYROID STIM HORMONE: CPT | Performed by: EMERGENCY MEDICINE

## 2022-04-14 PROCEDURE — 93005 ELECTROCARDIOGRAM TRACING: CPT | Performed by: EMERGENCY MEDICINE

## 2022-04-14 PROCEDURE — 71045 X-RAY EXAM CHEST 1 VIEW: CPT

## 2022-04-14 PROCEDURE — 96374 THER/PROPH/DIAG INJ IV PUSH: CPT

## 2022-04-14 PROCEDURE — 85025 COMPLETE CBC W/AUTO DIFF WBC: CPT | Performed by: EMERGENCY MEDICINE

## 2022-04-14 PROCEDURE — 93010 ELECTROCARDIOGRAM REPORT: CPT | Performed by: INTERNAL MEDICINE

## 2022-04-14 RX ORDER — LISINOPRIL 40 MG/1
40 TABLET ORAL DAILY
COMMUNITY

## 2022-04-14 RX ORDER — ATENOLOL 50 MG/1
25 TABLET ORAL DAILY
Qty: 30 TABLET | Refills: 0 | Status: SHIPPED | OUTPATIENT
Start: 2022-04-14 | End: 2022-04-18

## 2022-04-14 RX ORDER — DILTIAZEM HYDROCHLORIDE 5 MG/ML
10 INJECTION INTRAVENOUS ONCE
Status: COMPLETED | OUTPATIENT
Start: 2022-04-14 | End: 2022-04-14

## 2022-04-14 RX ADMIN — DILTIAZEM HYDROCHLORIDE 10 MG: 5 INJECTION INTRAVENOUS at 16:22

## 2022-04-14 NOTE — DISCHARGE INSTRUCTIONS
Follow-up with  next week.  Return to the emergency department if there is chest pain, shortness of breath, worse in any way at all.

## 2022-04-14 NOTE — ED NOTES
Pt reports fatigue since yesterday. Pt called her PCP today and was evaluated, pt was found to be in new onset Afib. Pt denies CP and SOB. Pt stated that a couple weeks ago she had an echo that showed a leaky heart valve.

## 2022-04-14 NOTE — ED PROVIDER NOTES
Subjective   History of Present Illness  History of Present Illness    Chief complaint: Fast heart rate    Location: Home    Quality/Severity: 129    Timing/Onset/Duration: Over the last couple days    Modifying Factors: Nothing seems to make it better    Associated Symptoms: No headache.  No fever chills or cough.  No sore throat earache or nasal congestion.  No chest pain or shortness of breath.  The patient did have a little dizziness.  No abdominal pain.  No diarrhea or burning when she urinates.  No numbness, tingling, weakness, or change in bladder or bowel function.    Narrative: This 81-year-old female presents with a 2-day history of fatigue.  Patient was seen at PCP office and sent here for new onset A. fib.  The patient has a history of dyslipidemia, and hypertension.  The patient is a former smoker and alcohol user.  The patient had an echocardiogram about 3 weeks ago that showed a leaky heart valve.    PCP: Josesito Lopez      Review of Systems   Constitutional: Negative for chills and fever.   HENT: Negative for congestion, ear pain and sore throat.    Respiratory: Negative for shortness of breath.    Cardiovascular: Positive for palpitations. Negative for chest pain and leg swelling.   Gastrointestinal: Negative for abdominal pain, diarrhea, nausea and vomiting.   Genitourinary: Negative for dysuria.   Musculoskeletal: Negative for back pain.   Skin: Negative for rash.   Neurological: Positive for dizziness. Negative for weakness, numbness and headaches.   Psychiatric/Behavioral: Negative for confusion.        Medication List      ASK your doctor about these medications    citalopram 20 MG tablet  Commonly known as: CeleXA     lisinopril 40 MG tablet  Commonly known as: PRINIVIL,ZESTRIL     lisinopril-hydrochlorothiazide 20-12.5 MG per tablet  Commonly known as: PRINZIDE,ZESTORETIC     raNITIdine 150 MG tablet  Commonly known as: ZANTAC     simvastatin 40 MG tablet  Commonly known as: ZOCOR             Past Medical History:   Diagnosis Date   • Colon polyp    • GERD (gastroesophageal reflux disease)    • Hyperlipidemia    • Hypertension        Allergies   Allergen Reactions   • Sulfa Antibiotics Hives       Past Surgical History:   Procedure Laterality Date   • COLONOSCOPY     • COLONOSCOPY N/A 2018    Procedure: COLONOSCOPY w/ polypectomy;  Surgeon: Gilberto Stringer MD;  Location: [facility] OR;  Service: Gastroenterology   • COLONOSCOPY W/ POLYPECTOMY N/A 11/15/2021    Procedure: COLONOSCOPY; POLYPECTOMY;  Surgeon: Gilberto Stringer MD;  Location: [facility] OR;  Service: Gastroenterology;  Laterality: N/A;  POLYP  DIVERTICULOSIS   • ENDOSCOPY     • EYE SURGERY      Bilateral Cataracts       History reviewed. No pertinent family history.    Social History     Socioeconomic History   • Marital status:    Tobacco Use   • Smoking status: Former Smoker     Quit date:      Years since quittin.3   • Smokeless tobacco: Never Used   Substance and Sexual Activity   • Alcohol use: Yes     Alcohol/week: 1.0 standard drink     Types: 1 Glasses of wine per week     Comment: daily   • Drug use: No           Objective   Physical Exam  Vitals (The temperature is 99.1 °F, pulse 105, respirations 16, /92, room air pulse ox 96%) and nursing note reviewed.   Constitutional:       Appearance: Normal appearance.   HENT:      Head: Normocephalic and atraumatic.      Mouth/Throat:      Mouth: Mucous membranes are moist.   Cardiovascular:      Rate and Rhythm: Tachycardia present. Rhythm irregular.      Heart sounds: Murmur heard.     No friction rub. No gallop.   Pulmonary:      Effort: Pulmonary effort is normal.      Breath sounds: Normal breath sounds.   Abdominal:      General: Abdomen is flat. Bowel sounds are normal. There is no distension.      Palpations: Abdomen is soft. There is no mass.      Tenderness: There is no abdominal tenderness. There is no guarding or rebound.       Hernia: No hernia is present.   Musculoskeletal:         General: No swelling or tenderness. Normal range of motion.      Cervical back: Normal range of motion and neck supple.   Skin:     General: Skin is warm and dry.   Neurological:      General: No focal deficit present.      Mental Status: She is alert and oriented to person, place, and time.         Procedures           ED Course  ED Course as of 04/14/22 1658   Thu Apr 14, 2022   1656 The laboratory values reviewed by me.  The proBNP is 3127 and the glucose is 134.  The laboratory values are otherwise unremarkable. [RC]      ED Course User Index  [RC] López Coto MD      15:54 EDT, 04/14/22:  The EKG shows atrial fibrillation with rate of 126.  There is PVC.  There is LVH with secondary repolarization abnormality.  There is anterior Q waves that is possibly due to LVH.  There is no acute ST elevation or depression     Echocardiogram 3/29/2022  · Calculated left ventricular EF = 63.7% Estimated left ventricular EF was in agreement with the calculated left ventricular EF. Left ventricular systolic function is normal. Normal left ventricular cavity size noted. Left ventricular wall thickness is consistent with moderate concentric hypertrophy. All left ventricular wall segments contract normally. Left ventricular diastolic function is consistent with (grade II w/high LAP) pseudonormalization.  · The left atrial cavity is moderately dilated. Saline test results are negative.  · The aortic valve is abnormal in structure. There is moderate calcification of the aortic valve. The aortic valve appears trileaflet. Mild to moderate aortic valve regurgitation is present. No hemodynamically significant aortic valve stenosis is present.  · Moderate mitral annular calcification is present. There is moderate, bileaflet mitral valve thickening present. Mild to moderate mitral valve regurgitation is present. No significant mitral valve stenosis is present.  · No dilation  of the aortic root is present. There is the presence of aortic root sclerosis/calcification. Mild dilation of the ascending aorta is present. Ascending aorta = 3.9 cm  16:59 EDT, 04/14/22:  The patient was reassessed.  She has a rate of 80, and atrial fibrillation.  She has no complaints.  She feels better.  [facility] has been paged out.       17:15 EDT, 04/14/22:  I spoke with Dr. Lachman, on-call for [facility], he recommended putting the patient on atenolol 25 mg a day and Eliquis 5 mg twice daily.  Patient should follow-up with [facility] within 1 week.    17:15 EDT, 04/14/22:  The patient's diagnosis of atrial fibrillation with RVR was discussed with her.  The patient will be placed on atenolol and Eliquis.  She should follow-up with [facility] next week.  She should return to the emergency department if there is worsening heart rate, chest pain, shortness of breath, worse in any way at all.  All the patient's question were answered she will be discharged in good condition.                                 MDM    Final diagnoses:   Atrial fibrillation with RVR ([facility])       ED Disposition  ED Disposition     None          No follow-up provider specified.       Medication List      No changes were made to your prescriptions during this visit.          López Coto MD  04/14/22 8365

## 2022-04-18 ENCOUNTER — TRANSCRIBE ORDERS (OUTPATIENT)
Dept: CARDIOLOGY | Facility: CLINIC | Age: 82
End: 2022-04-18

## 2022-04-18 ENCOUNTER — OFFICE VISIT (OUTPATIENT)
Dept: CARDIOLOGY | Facility: CLINIC | Age: 82
End: 2022-04-18

## 2022-04-18 VITALS
WEIGHT: 141 LBS | SYSTOLIC BLOOD PRESSURE: 112 MMHG | BODY MASS INDEX: 24.98 KG/M2 | HEIGHT: 63 IN | HEART RATE: 110 BPM | DIASTOLIC BLOOD PRESSURE: 80 MMHG

## 2022-04-18 DIAGNOSIS — I48.19 ATRIAL FIBRILLATION, PERSISTENT: Primary | ICD-10-CM

## 2022-04-18 DIAGNOSIS — Z13.6 SCREENING FOR ISCHEMIC HEART DISEASE: ICD-10-CM

## 2022-04-18 DIAGNOSIS — Z01.818 OTHER SPECIFIED PRE-OPERATIVE EXAMINATION: Primary | ICD-10-CM

## 2022-04-18 DIAGNOSIS — Z01.810 PRE-OPERATIVE CARDIOVASCULAR EXAMINATION: ICD-10-CM

## 2022-04-18 PROCEDURE — 93000 ELECTROCARDIOGRAM COMPLETE: CPT | Performed by: INTERNAL MEDICINE

## 2022-04-18 PROCEDURE — 99204 OFFICE O/P NEW MOD 45 MIN: CPT | Performed by: INTERNAL MEDICINE

## 2022-04-18 RX ORDER — AMLODIPINE BESYLATE 2.5 MG/1
5 TABLET ORAL DAILY
COMMUNITY
Start: 2022-03-24 | End: 2022-05-23 | Stop reason: SDUPTHER

## 2022-04-18 RX ORDER — AMIODARONE HYDROCHLORIDE 200 MG/1
200 TABLET ORAL DAILY
Qty: 30 TABLET | Refills: 5 | Status: SHIPPED | OUTPATIENT
Start: 2022-04-18 | End: 2022-04-25 | Stop reason: SDUPTHER

## 2022-04-18 RX ORDER — ASPIRIN 81 MG/1
81 TABLET, CHEWABLE ORAL EVERY OTHER DAY
COMMUNITY
End: 2022-04-25

## 2022-04-18 RX ORDER — LANSOPRAZOLE 30 MG/1
30 CAPSULE, DELAYED RELEASE ORAL DAILY
COMMUNITY

## 2022-04-18 RX ORDER — ATENOLOL 25 MG/1
12.5 TABLET ORAL DAILY
Qty: 15 TABLET | Refills: 11 | Status: SHIPPED | OUTPATIENT
Start: 2022-04-18 | End: 2022-04-20

## 2022-04-18 NOTE — PROGRESS NOTES
[facility]      Patient Name: Berta Garay  :1940  Age: 81 y.o.  Encounter Provider:  Wilder Joseph Jr, MD      Chief Complaint:   Chief Complaint   Patient presents with   • Atrial Fibrillation         HPI  Berta Garay is a 81 y.o. female with past medical history of hypertension, dyslipidemia and GERD who presents for initial evaluation of atrial fibrillation.  Patient had echocardiogram in late March which showed normal EF, moderately dilated left atrium, mild to moderate aortic and mitral regurgitation.  She had an episode of general malaise on 414 and presented to the ER in [place].  She was found to be in atrial fibrillation with RVR which improved after dose of beta-blocker.  Patient was started on atenolol but was given a dose of 50 mg which she was to cut in half.  With 25 mg of atenolol she was getting hypotensive and feeling terrible.  She cuts that in a quarter to get approximately 12.5 mg and is able to tolerate this fairly well.  She was started on apixaban and has no bleeding complications.  She is very active and her usual heart rate is low in the range of 50 to 60 bpm.  She denies any regular snoring or hypersomnia.  No angina with activity.  No orthopnea, PND or edema.  She was never a habitual smoker and drinks socially.  No family history of premature coronary artery disease or sudden cardiac death.      The following portions of the patient's history were reviewed and updated as appropriate: allergies, current medications, past family history, past medical history, past social history, past surgical history and problem list.      Review of Systems   Constitutional: Negative for chills and fever.   HENT: Negative for hoarse voice and sore throat.    Eyes: Negative for double vision and photophobia.   Cardiovascular: Positive for palpitations. Negative for chest pain, leg swelling, near-syncope, orthopnea, paroxysmal nocturnal dyspnea and syncope.  "  Respiratory: Negative for cough and wheezing.    Skin: Negative for poor wound healing and rash.   Musculoskeletal: Negative for arthritis and joint swelling.   Gastrointestinal: Negative for bloating, abdominal pain, hematemesis and hematochezia.   Neurological: Negative for dizziness and focal weakness.   Psychiatric/Behavioral: Negative for depression and suicidal ideas.       OBJECTIVE:   Vital Signs  Vitals:    04/18/22 1033   BP: 112/80     Estimated body mass index is 23.91 kg/m² as calculated from the following:    Height as of 4/14/22: 160 cm (63\").    Weight as of 4/14/22: 61.2 kg (135 lb).    Vitals reviewed.   Constitutional:       Appearance: Healthy appearance. Not in distress.   Neck:      Vascular: No JVR. JVD normal.   Pulmonary:      Effort: Pulmonary effort is normal.      Breath sounds: Normal breath sounds. No wheezing. No rhonchi. No rales.   Chest:      Chest wall: Not tender to palpatation.   Cardiovascular:      PMI at left midclavicular line. Tachycardia present. Irregularly irregular rhythm. Normal S1. Normal S2.      Murmurs: There is no murmur.      No gallop. No click. No rub.   Pulses:     Intact distal pulses.   Edema:     Peripheral edema absent.   Abdominal:      General: Bowel sounds are normal.      Palpations: Abdomen is soft.      Tenderness: There is no abdominal tenderness.   Musculoskeletal: Normal range of motion.         General: No tenderness. Skin:     General: Skin is warm and dry.   Neurological:      General: No focal deficit present.      Mental Status: Alert and oriented to person, place and time.           ECG 12 Lead    Date/Time: 4/18/2022 4:21 PM  Performed by: Wilder Joseph Jr., MD  Authorized by: Wilder Joseph Jr., MD   Comparison: compared with previous ECG from 4/14/2022  Similar to previous ECG  Rhythm: atrial fibrillation  Q waves: V1 and V2    QRS axis: left  Other findings: non-specific ST-T wave changes    Clinical impression: abnormal " EKG                  ASSESSMENT:     Persistent atrial fibrillation  Hypertension  Dyslipidemia    PLAN OF CARE:     1. Persistent atrial fibrillation -if the heart rate goes over 120 bpm the patient is very symptomatic.  Her resting heart rate is low so I do not think she will tolerate much in the way of beta-blockade long-term.  We are going to bring her in for TY cardioversion.  The Eliquis is not financially feasible for her as they paid the $500 charge for a 1 month supply.  I will get her a free 30-day supply and we will keep her on apixaban until after cardioversion.  I think the only option for her is going to be Coumadin.  We will continue 12.5 mg of atenolol for now.  I will start the patient on an amiodarone load and she will remain on amiodarone at least for the first 4 weeks after cardioversion.  I will see her back in clinic 4 weeks after TY cardioversion.  2. Hypertension  3. Aortic regurgitation  4. Mitral regurgitation  5. Dyslipidemia    Return to clinic 30 days         Discharge Medications          Accurate as of April 18, 2022 10:39 AM. If you have any questions, ask your nurse or doctor.            Changes to Medications      Instructions Start Date   apixaban 5 MG tablet tablet  Commonly known as: ELIQUIS  What changed: additional instructions   5 mg, Oral, Every 12 Hours Scheduled      atenolol 50 MG tablet  Commonly known as: TENORMIN  What changed: additional instructions   25 mg, Oral, Daily         Continue These Medications      Instructions Start Date   amLODIPine 2.5 MG tablet  Commonly known as: NORVASC   2.5 mg, Oral, Daily      aspirin 81 MG chewable tablet   81 mg, Oral, Every Other Day      citalopram 20 MG tablet  Commonly known as: CeleXA   20 mg, Oral, Daily      lansoprazole 30 MG capsule  Commonly known as: PREVACID   30 mg, Oral, Daily      lisinopril 40 MG tablet  Commonly known as: PRINIVIL,ZESTRIL   40 mg, Oral, Daily      lisinopril-hydrochlorothiazide 20-12.5 MG per  tablet  Commonly known as: PRINZIDE,ZESTORETIC   1 tablet, Oral, Daily      raNITIdine 150 MG tablet  Commonly known as: ZANTAC   150 mg, Oral, Daily      simvastatin 40 MG tablet  Commonly known as: ZOCOR   40 mg, Oral, Nightly      VITAMIN D (CHOLECALCIFEROL) PO   Oral             Thank you for allowing me to participate in the care of your patient,      Sincerely,   Wilder Joseph MD  [facility]  04/18/22  10:39 EDT

## 2022-04-20 ENCOUNTER — APPOINTMENT (OUTPATIENT)
Dept: GENERAL RADIOLOGY | Facility: HOSPITAL | Age: 82
End: 2022-04-20

## 2022-04-20 ENCOUNTER — APPOINTMENT (OUTPATIENT)
Dept: CT IMAGING | Facility: HOSPITAL | Age: 82
End: 2022-04-20

## 2022-04-20 ENCOUNTER — HOSPITAL ENCOUNTER (EMERGENCY)
Facility: HOSPITAL | Age: 82
Discharge: HOME | End: 2022-04-21
Attending: EMERGENCY MEDICINE | Admitting: EMERGENCY MEDICINE

## 2022-04-20 DIAGNOSIS — I48.91 ATRIAL FIBRILLATION WITH RVR: Primary | ICD-10-CM

## 2022-04-20 LAB
APTT PPP: 25.8 SECONDS (ref 22.7–35.4)
BASOPHILS # BLD AUTO: 0.04 10*3/MM3 (ref 0–0.2)
BASOPHILS NFR BLD AUTO: 0.5 % (ref 0–1.5)
DEPRECATED RDW RBC AUTO: 44.1 FL (ref 37–54)
EOSINOPHIL # BLD AUTO: 0.13 10*3/MM3 (ref 0–0.4)
EOSINOPHIL NFR BLD AUTO: 1.6 % (ref 0.3–6.2)
ERYTHROCYTE [DISTWIDTH] IN BLOOD BY AUTOMATED COUNT: 13.2 % (ref 12.3–15.4)
HCT VFR BLD AUTO: 39.2 % (ref 34–46.6)
HGB BLD-MCNC: 13.3 G/DL (ref 12–15.9)
IMM GRANULOCYTES # BLD AUTO: 0.02 10*3/MM3 (ref 0–0.05)
IMM GRANULOCYTES NFR BLD AUTO: 0.3 % (ref 0–0.5)
INR PPP: 1.06 (ref 0.9–1.1)
LYMPHOCYTES # BLD AUTO: 1.58 10*3/MM3 (ref 0.7–3.1)
LYMPHOCYTES NFR BLD AUTO: 19.9 % (ref 19.6–45.3)
MCH RBC QN AUTO: 31.2 PG (ref 26.6–33)
MCHC RBC AUTO-ENTMCNC: 33.9 G/DL (ref 31.5–35.7)
MCV RBC AUTO: 92 FL (ref 79–97)
MONOCYTES # BLD AUTO: 0.51 10*3/MM3 (ref 0.1–0.9)
MONOCYTES NFR BLD AUTO: 6.4 % (ref 5–12)
NEUTROPHILS NFR BLD AUTO: 5.67 10*3/MM3 (ref 1.7–7)
NEUTROPHILS NFR BLD AUTO: 71.3 % (ref 42.7–76)
NRBC BLD AUTO-RTO: 0 /100 WBC (ref 0–0.2)
NT-PROBNP SERPL-MCNC: 1659 PG/ML (ref 0–1800)
PLATELET # BLD AUTO: 274 10*3/MM3 (ref 140–450)
PMV BLD AUTO: 9.8 FL (ref 6–12)
PROTHROMBIN TIME: 13.8 SECONDS (ref 11.7–14.2)
RBC # BLD AUTO: 4.26 10*6/MM3 (ref 3.77–5.28)
TROPONIN T SERPL-MCNC: <0.01 NG/ML (ref 0–0.03)
WBC NRBC COR # BLD: 7.95 10*3/MM3 (ref 3.4–10.8)

## 2022-04-20 PROCEDURE — 25010000002 FUROSEMIDE PER 20 MG: Performed by: EMERGENCY MEDICINE

## 2022-04-20 PROCEDURE — 93010 ELECTROCARDIOGRAM REPORT: CPT | Performed by: INTERNAL MEDICINE

## 2022-04-20 PROCEDURE — 80053 COMPREHEN METABOLIC PANEL: CPT | Performed by: EMERGENCY MEDICINE

## 2022-04-20 PROCEDURE — 71045 X-RAY EXAM CHEST 1 VIEW: CPT

## 2022-04-20 PROCEDURE — 96374 THER/PROPH/DIAG INJ IV PUSH: CPT

## 2022-04-20 PROCEDURE — 85610 PROTHROMBIN TIME: CPT | Performed by: EMERGENCY MEDICINE

## 2022-04-20 PROCEDURE — 99284 EMERGENCY DEPT VISIT MOD MDM: CPT

## 2022-04-20 PROCEDURE — 93005 ELECTROCARDIOGRAM TRACING: CPT | Performed by: EMERGENCY MEDICINE

## 2022-04-20 PROCEDURE — 85730 THROMBOPLASTIN TIME PARTIAL: CPT | Performed by: EMERGENCY MEDICINE

## 2022-04-20 PROCEDURE — 83880 ASSAY OF NATRIURETIC PEPTIDE: CPT | Performed by: EMERGENCY MEDICINE

## 2022-04-20 PROCEDURE — 70450 CT HEAD/BRAIN W/O DYE: CPT

## 2022-04-20 PROCEDURE — 84484 ASSAY OF TROPONIN QUANT: CPT | Performed by: EMERGENCY MEDICINE

## 2022-04-20 PROCEDURE — 96375 TX/PRO/DX INJ NEW DRUG ADDON: CPT

## 2022-04-20 PROCEDURE — 85025 COMPLETE CBC W/AUTO DIFF WBC: CPT | Performed by: EMERGENCY MEDICINE

## 2022-04-20 RX ORDER — FUROSEMIDE 10 MG/ML
80 INJECTION INTRAMUSCULAR; INTRAVENOUS ONCE
Status: COMPLETED | OUTPATIENT
Start: 2022-04-20 | End: 2022-04-20

## 2022-04-20 RX ORDER — METOPROLOL TARTRATE 50 MG/1
50 TABLET, FILM COATED ORAL ONCE
Status: COMPLETED | OUTPATIENT
Start: 2022-04-20 | End: 2022-04-20

## 2022-04-20 RX ORDER — METOPROLOL TARTRATE 50 MG/1
50 TABLET, FILM COATED ORAL 2 TIMES DAILY
Qty: 60 TABLET | Refills: 0 | Status: SHIPPED | OUTPATIENT
Start: 2022-04-20 | End: 2022-04-25 | Stop reason: SDUPTHER

## 2022-04-20 RX ADMIN — FUROSEMIDE 80 MG: 10 INJECTION, SOLUTION INTRAMUSCULAR; INTRAVENOUS at 23:43

## 2022-04-20 RX ADMIN — METOPROLOL TARTRATE 5 MG: 1 INJECTION, SOLUTION INTRAVENOUS at 22:50

## 2022-04-20 RX ADMIN — METOPROLOL TARTRATE 50 MG: 50 TABLET, FILM COATED ORAL at 22:50

## 2022-04-21 VITALS
TEMPERATURE: 97.8 F | DIASTOLIC BLOOD PRESSURE: 88 MMHG | RESPIRATION RATE: 18 BRPM | BODY MASS INDEX: 23.92 KG/M2 | WEIGHT: 135 LBS | HEIGHT: 63 IN | OXYGEN SATURATION: 95 % | SYSTOLIC BLOOD PRESSURE: 128 MMHG | HEART RATE: 96 BPM

## 2022-04-21 LAB
ALBUMIN SERPL-MCNC: 4.2 G/DL (ref 3.5–5.2)
ALBUMIN/GLOB SERPL: 2 G/DL
ALP SERPL-CCNC: 82 U/L (ref 39–117)
ALT SERPL W P-5'-P-CCNC: 26 U/L (ref 1–33)
ANION GAP SERPL CALCULATED.3IONS-SCNC: 14.3 MMOL/L (ref 5–15)
AST SERPL-CCNC: 21 U/L (ref 1–32)
BILIRUB SERPL-MCNC: 0.3 MG/DL (ref 0–1.2)
BUN SERPL-MCNC: 13 MG/DL (ref 8–23)
BUN/CREAT SERPL: 16.5 (ref 7–25)
CALCIUM SPEC-SCNC: 9.6 MG/DL (ref 8.6–10.5)
CHLORIDE SERPL-SCNC: 105 MMOL/L (ref 98–107)
CO2 SERPL-SCNC: 21.7 MMOL/L (ref 22–29)
CREAT SERPL-MCNC: 0.79 MG/DL (ref 0.57–1)
EGFRCR SERPLBLD CKD-EPI 2021: 75.3 ML/MIN/1.73
GLOBULIN UR ELPH-MCNC: 2.1 GM/DL
GLUCOSE SERPL-MCNC: 151 MG/DL (ref 65–99)
POTASSIUM SERPL-SCNC: 3.8 MMOL/L (ref 3.5–5.2)
PROT SERPL-MCNC: 6.3 G/DL (ref 6–8.5)
QT INTERVAL: 344 MS
SODIUM SERPL-SCNC: 141 MMOL/L (ref 136–145)

## 2022-04-21 NOTE — ED PROVIDER NOTES
EMERGENCY DEPARTMENT ENCOUNTER    Room Number:  14/14  Date of encounter:  4/20/2022  PCP: Josesito Lopez MD  Historian: Patient      HPI:  Chief Complaint: Multiple complaints  A complete HPI/ROS/PMH/PSH/SH/FH are unobtainable due to: Nothing    Context: Berta Garay is a 81 y.o. female who presents to the ED c/o multiple complaints for the last few days.  Patient was diagnosed with new onset atrial fib earlier in the week and was seen by Dr. Matias in the office.  She is on amiodarone and Eliquis with a plan for cardioversion next week.    Since then she has had episodic tachycardia and shortness of breath with exertion.  Today she said its been worse than usual and her blood pressure is quite high.  She says there is moderate shortness of breath with exertion, but no chest pain.  Patient also says that she has some fullness up in her neck and a mild diffuse headache.    She has no focal neurologic deficits and no other associated symptoms      PAST MEDICAL HISTORY  Active Ambulatory Problems     Diagnosis Date Noted   • Screening for colon cancer 10/10/2018   • Encounter for screening for malignant neoplasm of colon 04/08/2021   • Personal history of colonic polyps 04/08/2021     Resolved Ambulatory Problems     Diagnosis Date Noted   • No Resolved Ambulatory Problems     Past Medical History:   Diagnosis Date   • Colon polyp    • GERD (gastroesophageal reflux disease)    • Hyperlipidemia    • Hypertension          PAST SURGICAL HISTORY  Past Surgical History:   Procedure Laterality Date   • COLONOSCOPY     • COLONOSCOPY N/A 11/9/2018    Procedure: COLONOSCOPY w/ polypectomy;  Surgeon: Gilberto Stringer MD;  Location: [facility] OR;  Service: Gastroenterology   • COLONOSCOPY W/ POLYPECTOMY N/A 11/15/2021    Procedure: COLONOSCOPY; POLYPECTOMY;  Surgeon: Gilberto Stringer MD;  Location: [facility] OR;  Service: Gastroenterology;  Laterality: N/A;  POLYP  DIVERTICULOSIS   • ENDOSCOPY     •  EYE SURGERY      Bilateral Cataracts         FAMILY HISTORY  Family History   Problem Relation Age of Onset   • Hypertension Mother    • Heart disease Father         cabg         SOCIAL HISTORY  Social History     Socioeconomic History   • Marital status:    Tobacco Use   • Smoking status: Former Smoker     Quit date:      Years since quittin.3   • Smokeless tobacco: Never Used   Substance and Sexual Activity   • Alcohol use: Yes     Alcohol/week: 1.0 standard drink     Types: 1 Glasses of wine per week     Comment: daily   • Drug use: No         ALLERGIES  Sulfa antibiotics        REVIEW OF SYSTEMS  Review of Systems     All systems reviewed and negative except for those discussed in HPI.       PHYSICAL EXAM    I have reviewed the triage vital signs and nursing notes.    ED Triage Vitals [22]   Temp Heart Rate Resp BP SpO2   97.8 °F (36.6 °C) 108 18 130/90 97 %      Temp src Heart Rate Source Patient Position BP Location FiO2 (%)   -- -- -- -- --       Physical Exam  GENERAL: Awake and alert, no significant distress  HENT: nares patent, no JVD  EYES: no scleral icterus  CV: Atrial for been about 120  RESPIRATORY: normal effort, a few rales in the bases but no significant respiratory distress accessory muscle use  ABDOMEN: soft  MUSCULOSKELETAL: no deformity, no pedal edema or calf tenderness  NEURO: alert, moves all extremities, follows commands  SKIN: warm, dry        LAB RESULTS  Recent Results (from the past 24 hour(s))   Protime-INR    Collection Time: 22 10:49 PM    Specimen: Blood   Result Value Ref Range    Protime 13.8 11.7 - 14.2 Seconds    INR 1.06 0.90 - 1.10   aPTT    Collection Time: 22 10:49 PM    Specimen: Blood   Result Value Ref Range    PTT 25.8 22.7 - 35.4 seconds   CBC Auto Differential    Collection Time: 22 10:49 PM    Specimen: Blood   Result Value Ref Range    WBC 7.95 3.40 - 10.80 10*3/mm3    RBC 4.26 3.77 - 5.28 10*6/mm3    Hemoglobin 13.3 12.0  - 15.9 g/dL    Hematocrit 39.2 34.0 - 46.6 %    MCV 92.0 79.0 - 97.0 fL    MCH 31.2 26.6 - 33.0 pg    MCHC 33.9 31.5 - 35.7 g/dL    RDW 13.2 12.3 - 15.4 %    RDW-SD 44.1 37.0 - 54.0 fl    MPV 9.8 6.0 - 12.0 fL    Platelets 274 140 - 450 10*3/mm3    Neutrophil % 71.3 42.7 - 76.0 %    Lymphocyte % 19.9 19.6 - 45.3 %    Monocyte % 6.4 5.0 - 12.0 %    Eosinophil % 1.6 0.3 - 6.2 %    Basophil % 0.5 0.0 - 1.5 %    Immature Grans % 0.3 0.0 - 0.5 %    Neutrophils, Absolute 5.67 1.70 - 7.00 10*3/mm3    Lymphocytes, Absolute 1.58 0.70 - 3.10 10*3/mm3    Monocytes, Absolute 0.51 0.10 - 0.90 10*3/mm3    Eosinophils, Absolute 0.13 0.00 - 0.40 10*3/mm3    Basophils, Absolute 0.04 0.00 - 0.20 10*3/mm3    Immature Grans, Absolute 0.02 0.00 - 0.05 10*3/mm3    nRBC 0.0 0.0 - 0.2 /100 WBC   BNP    Collection Time: 04/20/22 10:49 PM    Specimen: Blood   Result Value Ref Range    proBNP 1,659.0 0.0 - 1,800.0 pg/mL   Troponin    Collection Time: 04/20/22 10:49 PM    Specimen: Blood   Result Value Ref Range    Troponin T <0.010 0.000 - 0.030 ng/mL   ECG 12 Lead    Collection Time: 04/20/22 11:07 PM   Result Value Ref Range    QT Interval 344 ms       Ordered the above labs and independently reviewed the results.        RADIOLOGY  CT Head Without Contrast    Result Date: 4/20/2022  CT HEAD WITHOUT CONTRAST  HISTORY: Headache. Patient is on blood thinners.  COMPARISON: None available.  TECHNIQUE: Axial CT imaging was obtained through the brain. No IV contrast was administered.  FINDINGS: No acute intracranial hemorrhage is seen. There is diffuse atrophy. There is periventricular and deep white matter microangiopathic change. There is no midline shift or mass effect. The paranasal sinuses and mastoid air cells appear clear.      No acute intracranial findings.  Radiation dose reduction techniques were utilized, including automated exposure control and exposure modulation based on body size.  This report was finalized on 4/20/2022 10:07 PM by  Dr. Cinthya Seaman M.D.      XR Chest 1 View    Result Date: 4/20/2022  SINGLE VIEW OF THE CHEST  HISTORY: Shortness of air  COMPARISON: None available.  FINDINGS: Cardiomegaly is present. There is no vascular congestion. There is elevation of the right hemidiaphragm. No pneumothorax, pleural effusion, or acute infiltrate is seen.      No acute findings.  This report was finalized on 4/20/2022 11:18 PM by Dr. Cinthya Seaman M.D.        I ordered the above noted radiological studies. Reviewed by me and discussed with radiologist.  See dictation for official radiology interpretation.      PROCEDURES    Procedures      MEDICATIONS GIVEN IN ER    Medications   metoprolol tartrate (LOPRESSOR) injection 5 mg (5 mg Intravenous Given 4/20/22 2250)   metoprolol tartrate (LOPRESSOR) tablet 50 mg (50 mg Oral Given 4/20/22 2250)   furosemide (LASIX) injection 80 mg (80 mg Intravenous Given 4/20/22 2343)         PROGRESS, DATA ANALYSIS, CONSULTS, AND MEDICAL DECISION MAKING    All labs have been independently reviewed by me.  All radiology studies have been reviewed by me and discussed with radiologist dictating the report.   EKG's independently viewed and interpreted by me.  Discussion below represents my analysis of pertinent findings related to patient's condition, differential diagnosis, treatment plan and final disposition.        ED Course as of 04/20/22 2349 Wed Apr 20, 2022 2343 Patient symptoms are much improved at this point.  After IV and p.o. beta-blockade her heart rates now in the 90s.  She is on room air with O2 sats of 96% and no dyspnea.  Her blood pressure is much improved. [DP]   2344 Her chest x-ray shows no active disease and proBNP and troponin are normal [DP]   2344 EKG  Atrial for with a rate of 95  No acute ST segment changes are seen to suggest ischemia. [DP]   2344 I am going to add a beta-blocker to her regimen and give her a dose of Lasix before she goes home.  I am not certain that the  hypoxemia that was documented in triage was accurate because she has been on room air back here for nearly the entire time and is not been hypoxic. [DP]   2344 She is scheduled for cardioversion with Dr. Mckinney on Monday and I think she should be fine to do that.  Today she was out working in the yard and probably overexerted.  I have encouraged her that until she gets her cardioversion she should probably take it easy so as not to put any extra strain on her heart. [DP]   2348 CBC is normal [DP]   2348 Troponin and proBNP are normal [DP]   2348 Chest x-ray shows no acute disease [DP]   2349 Patient is anticoagulated by the cardiologist in preparation for cardioversion. [DP]      ED Course User Index  [DP] Flaco Mercedes MD           PPE: The patient wore a surgical mask throughout the entire patient encounter. I wore an N95.    AS OF 23:49 EDT VITALS:    BP - 140/99  HR - 103  TEMP - 97.8 °F (36.6 °C)  O2 SATS - 94%        DIAGNOSIS  Final diagnoses:   Atrial fibrillation with RVR ([facility])         DISPOSITION  Discharge           Flaco Mercedes MD  04/20/22 9020

## 2022-04-21 NOTE — ED TRIAGE NOTES
Pt arrives to ED via EMS from home with c/o headache/neck pain.  Pt with hx of AFIB.  Pt was 89% on RA when EMS arrived, placed on 5L NC.

## 2022-04-22 ENCOUNTER — LAB (OUTPATIENT)
Dept: LAB | Facility: HOSPITAL | Age: 82
End: 2022-04-22

## 2022-04-22 DIAGNOSIS — Z01.810 PRE-OPERATIVE CARDIOVASCULAR EXAMINATION: ICD-10-CM

## 2022-04-22 DIAGNOSIS — Z01.818 OTHER SPECIFIED PRE-OPERATIVE EXAMINATION: ICD-10-CM

## 2022-04-22 DIAGNOSIS — Z13.6 SCREENING FOR ISCHEMIC HEART DISEASE: ICD-10-CM

## 2022-04-22 LAB — SARS-COV-2 RNA PNL SPEC NAA+PROBE: NOT DETECTED

## 2022-04-22 PROCEDURE — 87635 SARS-COV-2 COVID-19 AMP PRB: CPT | Performed by: INTERNAL MEDICINE

## 2022-04-22 PROCEDURE — C9803 HOPD COVID-19 SPEC COLLECT: HCPCS

## 2022-04-25 ENCOUNTER — ANESTHESIA EVENT (OUTPATIENT)
Dept: POSTOP/PACU | Facility: HOSPITAL | Age: 82
End: 2022-04-25

## 2022-04-25 ENCOUNTER — ANESTHESIA (OUTPATIENT)
Dept: POSTOP/PACU | Facility: HOSPITAL | Age: 82
End: 2022-04-25

## 2022-04-25 ENCOUNTER — HOSPITAL ENCOUNTER (OUTPATIENT)
Dept: POSTOP/PACU | Facility: HOSPITAL | Age: 82
Discharge: HOME | End: 2022-04-25

## 2022-04-25 VITALS — SYSTOLIC BLOOD PRESSURE: 74 MMHG | DIASTOLIC BLOOD PRESSURE: 49 MMHG | OXYGEN SATURATION: 99 %

## 2022-04-25 VITALS
TEMPERATURE: 98.1 F | RESPIRATION RATE: 16 BRPM | SYSTOLIC BLOOD PRESSURE: 110 MMHG | HEART RATE: 50 BPM | OXYGEN SATURATION: 98 % | DIASTOLIC BLOOD PRESSURE: 65 MMHG

## 2022-04-25 DIAGNOSIS — I48.19 ATRIAL FIBRILLATION, PERSISTENT: ICD-10-CM

## 2022-04-25 LAB
ANION GAP SERPL CALCULATED.3IONS-SCNC: 12 MMOL/L (ref 5–15)
BH CV ECHO MEAS - MR MAX PG: 61.8 MMHG
BH CV ECHO MEAS - MR MAX VEL: 393.1 CM/SEC
BH CV ECHO MEAS - RAP SYSTOLE: 3 MMHG
BH CV ECHO MEAS - RVSP: 26 MMHG
BH CV ECHO MEAS - TR MAX PG: 22.8 MMHG
BH CV ECHO MEAS - TR MAX VEL: 238.7 CM/SEC
BUN SERPL-MCNC: 12 MG/DL (ref 8–23)
BUN/CREAT SERPL: 14.1 (ref 7–25)
CALCIUM SPEC-SCNC: 9 MG/DL (ref 8.6–10.5)
CHLORIDE SERPL-SCNC: 104 MMOL/L (ref 98–107)
CO2 SERPL-SCNC: 24 MMOL/L (ref 22–29)
CREAT SERPL-MCNC: 0.85 MG/DL (ref 0.57–1)
EGFRCR SERPLBLD CKD-EPI 2021: 68.5 ML/MIN/1.73
GLUCOSE SERPL-MCNC: 98 MG/DL (ref 65–99)
POTASSIUM SERPL-SCNC: 3.9 MMOL/L (ref 3.5–5.2)
QT INTERVAL: 457 MS
QT INTERVAL: 479 MS
SODIUM SERPL-SCNC: 140 MMOL/L (ref 136–145)

## 2022-04-25 PROCEDURE — 92960 CARDIOVERSION ELECTRIC EXT: CPT

## 2022-04-25 PROCEDURE — 93312 ECHO TRANSESOPHAGEAL: CPT | Performed by: INTERNAL MEDICINE

## 2022-04-25 PROCEDURE — 93321 DOPPLER ECHO F-UP/LMTD STD: CPT | Performed by: INTERNAL MEDICINE

## 2022-04-25 PROCEDURE — 93312 ECHO TRANSESOPHAGEAL: CPT

## 2022-04-25 PROCEDURE — 93010 ELECTROCARDIOGRAM REPORT: CPT | Performed by: INTERNAL MEDICINE

## 2022-04-25 PROCEDURE — 25010000002 PHENYLEPHRINE 10 MG/ML SOLUTION: Performed by: ANESTHESIOLOGY

## 2022-04-25 PROCEDURE — 92960 CARDIOVERSION ELECTRIC EXT: CPT | Performed by: INTERNAL MEDICINE

## 2022-04-25 PROCEDURE — 80048 BASIC METABOLIC PNL TOTAL CA: CPT | Performed by: INTERNAL MEDICINE

## 2022-04-25 PROCEDURE — 93321 DOPPLER ECHO F-UP/LMTD STD: CPT

## 2022-04-25 PROCEDURE — 93325 DOPPLER ECHO COLOR FLOW MAPG: CPT

## 2022-04-25 PROCEDURE — 93325 DOPPLER ECHO COLOR FLOW MAPG: CPT | Performed by: INTERNAL MEDICINE

## 2022-04-25 PROCEDURE — 25010000002 PROPOFOL 10 MG/ML EMULSION: Performed by: ANESTHESIOLOGY

## 2022-04-25 PROCEDURE — 93005 ELECTROCARDIOGRAM TRACING: CPT | Performed by: INTERNAL MEDICINE

## 2022-04-25 RX ORDER — EPHEDRINE SULFATE 50 MG/ML
5 INJECTION, SOLUTION INTRAVENOUS ONCE AS NEEDED
Status: COMPLETED | OUTPATIENT
Start: 2022-04-25 | End: 2022-04-25

## 2022-04-25 RX ORDER — PROMETHAZINE HYDROCHLORIDE 25 MG/1
25 TABLET ORAL ONCE AS NEEDED
Status: DISCONTINUED | OUTPATIENT
Start: 2022-04-25 | End: 2022-04-26 | Stop reason: HOSPADM

## 2022-04-25 RX ORDER — METOPROLOL TARTRATE 50 MG/1
50 TABLET, FILM COATED ORAL 2 TIMES DAILY
Qty: 60 TABLET | Refills: 0 | Status: SHIPPED | OUTPATIENT
Start: 2022-04-25 | End: 2022-04-25 | Stop reason: SDUPTHER

## 2022-04-25 RX ORDER — OXYCODONE AND ACETAMINOPHEN 7.5; 325 MG/1; MG/1
1 TABLET ORAL EVERY 4 HOURS PRN
Status: DISCONTINUED | OUTPATIENT
Start: 2022-04-25 | End: 2022-04-26 | Stop reason: HOSPADM

## 2022-04-25 RX ORDER — PROPOFOL 10 MG/ML
VIAL (ML) INTRAVENOUS AS NEEDED
Status: DISCONTINUED | OUTPATIENT
Start: 2022-04-25 | End: 2022-04-25 | Stop reason: SURG

## 2022-04-25 RX ORDER — SODIUM CHLORIDE 0.9 % (FLUSH) 0.9 %
10 SYRINGE (ML) INJECTION EVERY 12 HOURS SCHEDULED
Status: DISCONTINUED | OUTPATIENT
Start: 2022-04-25 | End: 2022-04-26 | Stop reason: HOSPADM

## 2022-04-25 RX ORDER — HYDROMORPHONE HYDROCHLORIDE 1 MG/ML
0.5 INJECTION, SOLUTION INTRAMUSCULAR; INTRAVENOUS; SUBCUTANEOUS
Status: DISCONTINUED | OUTPATIENT
Start: 2022-04-25 | End: 2022-04-26 | Stop reason: HOSPADM

## 2022-04-25 RX ORDER — IBUPROFEN 600 MG/1
600 TABLET ORAL ONCE AS NEEDED
Status: DISCONTINUED | OUTPATIENT
Start: 2022-04-25 | End: 2022-04-26 | Stop reason: HOSPADM

## 2022-04-25 RX ORDER — PHENYLEPHRINE HYDROCHLORIDE 10 MG/ML
INJECTION INTRAVENOUS AS NEEDED
Status: DISCONTINUED | OUTPATIENT
Start: 2022-04-25 | End: 2022-04-25 | Stop reason: SURG

## 2022-04-25 RX ORDER — LABETALOL HYDROCHLORIDE 5 MG/ML
5 INJECTION, SOLUTION INTRAVENOUS
Status: DISCONTINUED | OUTPATIENT
Start: 2022-04-25 | End: 2022-04-26 | Stop reason: HOSPADM

## 2022-04-25 RX ORDER — DIPHENHYDRAMINE HCL 25 MG
25 CAPSULE ORAL
Status: DISCONTINUED | OUTPATIENT
Start: 2022-04-25 | End: 2022-04-26 | Stop reason: HOSPADM

## 2022-04-25 RX ORDER — PROMETHAZINE HYDROCHLORIDE 25 MG/1
25 SUPPOSITORY RECTAL ONCE AS NEEDED
Status: DISCONTINUED | OUTPATIENT
Start: 2022-04-25 | End: 2022-04-26 | Stop reason: HOSPADM

## 2022-04-25 RX ORDER — FLUMAZENIL 0.1 MG/ML
0.2 INJECTION INTRAVENOUS AS NEEDED
Status: DISCONTINUED | OUTPATIENT
Start: 2022-04-25 | End: 2022-04-26 | Stop reason: HOSPADM

## 2022-04-25 RX ORDER — HYDROCODONE BITARTRATE AND ACETAMINOPHEN 7.5; 325 MG/1; MG/1
1 TABLET ORAL ONCE AS NEEDED
Status: DISCONTINUED | OUTPATIENT
Start: 2022-04-25 | End: 2022-04-26 | Stop reason: HOSPADM

## 2022-04-25 RX ORDER — AMIODARONE HYDROCHLORIDE 200 MG/1
200 TABLET ORAL DAILY
Qty: 30 TABLET | Refills: 5 | Status: SHIPPED | OUTPATIENT
Start: 2022-04-25 | End: 2022-05-23 | Stop reason: ALTCHOICE

## 2022-04-25 RX ORDER — FENTANYL CITRATE 50 UG/ML
50 INJECTION, SOLUTION INTRAMUSCULAR; INTRAVENOUS
Status: DISCONTINUED | OUTPATIENT
Start: 2022-04-25 | End: 2022-04-26 | Stop reason: HOSPADM

## 2022-04-25 RX ORDER — SODIUM CHLORIDE 0.9 % (FLUSH) 0.9 %
10 SYRINGE (ML) INJECTION AS NEEDED
Status: DISCONTINUED | OUTPATIENT
Start: 2022-04-25 | End: 2022-04-26 | Stop reason: HOSPADM

## 2022-04-25 RX ORDER — METOPROLOL TARTRATE 50 MG/1
50 TABLET, FILM COATED ORAL 2 TIMES DAILY
Qty: 60 TABLET | Refills: 0 | Status: SHIPPED | OUTPATIENT
Start: 2022-04-25 | End: 2022-05-23 | Stop reason: ALTCHOICE

## 2022-04-25 RX ORDER — PROPOFOL 10 MG/ML
VIAL (ML) INTRAVENOUS CONTINUOUS PRN
Status: DISCONTINUED | OUTPATIENT
Start: 2022-04-25 | End: 2022-04-25 | Stop reason: SURG

## 2022-04-25 RX ORDER — SODIUM CHLORIDE 9 MG/ML
INJECTION, SOLUTION INTRAVENOUS CONTINUOUS PRN
Status: DISCONTINUED | OUTPATIENT
Start: 2022-04-25 | End: 2022-04-25 | Stop reason: SURG

## 2022-04-25 RX ORDER — SODIUM CHLORIDE, SODIUM LACTATE, POTASSIUM CHLORIDE, CALCIUM CHLORIDE 600; 310; 30; 20 MG/100ML; MG/100ML; MG/100ML; MG/100ML
9 INJECTION, SOLUTION INTRAVENOUS CONTINUOUS
Status: DISCONTINUED | OUTPATIENT
Start: 2022-04-25 | End: 2022-04-26 | Stop reason: HOSPADM

## 2022-04-25 RX ORDER — FAMOTIDINE 10 MG/ML
20 INJECTION, SOLUTION INTRAVENOUS ONCE
Status: DISCONTINUED | OUTPATIENT
Start: 2022-04-25 | End: 2022-04-26 | Stop reason: HOSPADM

## 2022-04-25 RX ORDER — NALOXONE HCL 0.4 MG/ML
0.2 VIAL (ML) INJECTION AS NEEDED
Status: DISCONTINUED | OUTPATIENT
Start: 2022-04-25 | End: 2022-04-26 | Stop reason: HOSPADM

## 2022-04-25 RX ORDER — HYDRALAZINE HYDROCHLORIDE 20 MG/ML
5 INJECTION INTRAMUSCULAR; INTRAVENOUS
Status: DISCONTINUED | OUTPATIENT
Start: 2022-04-25 | End: 2022-04-26 | Stop reason: HOSPADM

## 2022-04-25 RX ORDER — MIDAZOLAM HYDROCHLORIDE 1 MG/ML
0.5 INJECTION INTRAMUSCULAR; INTRAVENOUS
Status: DISCONTINUED | OUTPATIENT
Start: 2022-04-25 | End: 2022-04-26 | Stop reason: HOSPADM

## 2022-04-25 RX ORDER — ONDANSETRON 2 MG/ML
4 INJECTION INTRAMUSCULAR; INTRAVENOUS ONCE AS NEEDED
Status: DISCONTINUED | OUTPATIENT
Start: 2022-04-25 | End: 2022-04-26 | Stop reason: HOSPADM

## 2022-04-25 RX ORDER — DIPHENHYDRAMINE HYDROCHLORIDE 50 MG/ML
12.5 INJECTION INTRAMUSCULAR; INTRAVENOUS
Status: DISCONTINUED | OUTPATIENT
Start: 2022-04-25 | End: 2022-04-26 | Stop reason: HOSPADM

## 2022-04-25 RX ADMIN — SODIUM CHLORIDE: 9 INJECTION, SOLUTION INTRAVENOUS at 07:18

## 2022-04-25 RX ADMIN — PROPOFOL 100 MG: 10 INJECTION, EMULSION INTRAVENOUS at 07:21

## 2022-04-25 RX ADMIN — PHENYLEPHRINE HYDROCHLORIDE 100 MCG: 10 INJECTION, SOLUTION INTRAVENOUS at 07:38

## 2022-04-25 RX ADMIN — Medication 100 MCG/KG/MIN: at 07:22

## 2022-04-25 RX ADMIN — EPHEDRINE SULFATE 15 MG: 50 INJECTION INTRAVENOUS at 07:45

## 2022-04-25 NOTE — DISCHARGE INSTRUCTIONS
Electrical Cardioversion  Electrical cardioversion is the delivery of a jolt of electricity to restore a normal rhythm to the heart. A rhythm that is too fast or is not regular keeps the heart from pumping well. In this procedure, sticky patches or metal paddles are placed on the chest to deliver electricity to the heart from a device.  This procedure may be done in an emergency if:  There is low or no blood pressure as a result of the heart rhythm.  Normal rhythm must be restored as fast as possible to protect the brain and heart from further damage.  It may save a life.  This may also be a scheduled procedure for irregular or fast heart rhythms that are not immediately life-threatening.  What are the risks?  Generally, this is a safe procedure. However, problems may occur, including:  Allergic reactions to medicines.  A blood clot that breaks free and travels to other parts of your body (a stroke).    The possible return of an abnormal heart rhythm within hours or days after the procedure.  Your heart stopping (cardiac arrest). This is rare.  .  Follow these instructions at home:  Do not drive for 24 hours if you were given a sedative during your procedure.  Take over-the-counter and prescription medicines only as told by your health care provider.  Ask your health care provider how to check your pulse. Check it often.  Rest for 24 hours after the procedure or as told by your health care provider.  Avoid or limit your caffeine use as told by your health care provider.  Keep all follow-up visits as told by your health care provider. This is important.  Contact a health care provider if:  You feel like your heart is beating too quickly or your pulse is not regular.  You have a serious muscle cramp that does not go away.  Get help right away if:  You have discomfort in your chest.  You are dizzy or you feel faint.  You have trouble breathing or you are short of breath.  Your speech is slurred.  You have trouble  moving an arm or leg on one side of your body.  Your fingers or toes turn cold or blue.      Call 911 if:     You have any symptoms of a stroke.  Remember BE FAST  B-balance. Sudden trouble walking or loss of balance.  E-eyes.  Sudden changes in how you see or a sudden onset of a very bad headache.   F-face. Sudden weakness or loss of feeling of the face or facial droop on one side.   A-arms Sudden weakness or numbness in one arm.  One arm drifts down if they are both held out in front of you. This happens suddenly and usually on one side of the body.  S-speech.  Sudden trouble speaking, slurred speech or trouble understanding what people are saying.   T-time Time to call emergency services.  Write down the symptoms and the time they started.       This information is not intended to replace advice given to you by your health care provider. Make sure you discuss any questions you have with your health care provider.

## 2022-04-25 NOTE — ANESTHESIA PREPROCEDURE EVALUATION
Anesthesia Evaluation     no history of anesthetic complications:               Airway   Mallampati: II  TM distance: >3 FB  Neck ROM: full  Dental - normal exam     Pulmonary    (+) a smoker Former,   (-) shortness of breath  Cardiovascular     (+) hypertension, dysrhythmias Atrial Fib, hyperlipidemia,       Neuro/Psych  GI/Hepatic/Renal/Endo    (+)  GERD,    (-) no renal disease, diabetes    Musculoskeletal     Abdominal    Substance History      OB/GYN          Other                        Anesthesia Plan    ASA 3     general     intravenous induction     Anesthetic plan, all risks, benefits, and alternatives have been provided, discussed and informed consent has been obtained with: patient.        CODE STATUS:

## 2022-04-25 NOTE — H&P
History and physical reviewed with no change to details.  Patient with atrial fibrillation and difficult to control rate was seen in the office very symptomatic and difficult time on beta-blocker.  She was seen in the ER the other night and started on a different beta-blocker seems to have better rate control but is here for TY cardioversion.  She has been compliant with amiodarone and Eliquis therapy.  She has no contraindications to TY or cardioversion.  Risks and benefits of explained with informed consent obtained.  Follow diagnostic testing results for further treatment recommendations.           [facility]      Patient Name: Berta Garay  :1940  Age: 82 y.o.  Encounter Provider:  Wilder Joseph Jr, MD      Chief Complaint:   No chief complaint on file.        HPI  Berta Garay is a 82 y.o. female with past medical history of hypertension, dyslipidemia and GERD who presents for initial evaluation of atrial fibrillation.  Patient had echocardiogram in late March which showed normal EF, moderately dilated left atrium, mild to moderate aortic and mitral regurgitation.  She had an episode of general malaise on 414 and presented to the ER in [place].  She was found to be in atrial fibrillation with RVR which improved after dose of beta-blocker.  Patient was started on atenolol but was given a dose of 50 mg which she was to cut in half.  With 25 mg of atenolol she was getting hypotensive and feeling terrible.  She cuts that in a quarter to get approximately 12.5 mg and is able to tolerate this fairly well.  She was started on apixaban and has no bleeding complications.  She is very active and her usual heart rate is low in the range of 50 to 60 bpm.  She denies any regular snoring or hypersomnia.  No angina with activity.  No orthopnea, PND or edema.  She was never a habitual smoker and drinks socially.  No family history of premature coronary artery disease or sudden cardiac  "death.      The following portions of the patient's history were reviewed and updated as appropriate: allergies, current medications, past family history, past medical history, past social history, past surgical history and problem list.      Review of Systems   Constitutional: Negative for chills and fever.   HENT: Negative for hoarse voice and sore throat.    Eyes: Negative for double vision and photophobia.   Cardiovascular: Positive for palpitations. Negative for chest pain, leg swelling, near-syncope, orthopnea, paroxysmal nocturnal dyspnea and syncope.   Respiratory: Negative for cough and wheezing.    Skin: Negative for poor wound healing and rash.   Musculoskeletal: Negative for arthritis and joint swelling.   Gastrointestinal: Negative for bloating, abdominal pain, hematemesis and hematochezia.   Neurological: Negative for dizziness and focal weakness.   Psychiatric/Behavioral: Negative for depression and suicidal ideas.       OBJECTIVE:   Vital Signs  Vitals:    04/25/22 0553   BP: 136/94   Pulse: 84   Resp: 16   Temp: 98.1 °F (36.7 °C)   SpO2: 95%     Estimated body mass index is 23.91 kg/m² as calculated from the following:    Height as of 4/20/22: 160 cm (63\").    Weight as of 4/20/22: 61.2 kg (135 lb).    Vitals reviewed.   Constitutional:       Appearance: Healthy appearance. Not in distress.   Neck:      Vascular: No JVR. JVD normal.   Pulmonary:      Effort: Pulmonary effort is normal.      Breath sounds: Normal breath sounds. No wheezing. No rhonchi. No rales.   Chest:      Chest wall: Not tender to palpatation.   Cardiovascular:      PMI at left midclavicular line. Tachycardia present. Irregularly irregular rhythm. Normal S1. Normal S2.      Murmurs: There is no murmur.      No gallop. No click. No rub.   Pulses:     Intact distal pulses.   Edema:     Peripheral edema absent.   Abdominal:      General: Bowel sounds are normal.      Palpations: Abdomen is soft.      Tenderness: There is no " abdominal tenderness.   Musculoskeletal: Normal range of motion.         General: No tenderness. Skin:     General: Skin is warm and dry.   Neurological:      General: No focal deficit present.      Mental Status: Alert and oriented to person, place and time.         Procedures          ASSESSMENT:     Persistent atrial fibrillation  Hypertension  Dyslipidemia    PLAN OF CARE:     1. Persistent atrial fibrillation -if the heart rate goes over 120 bpm the patient is very symptomatic.  Her resting heart rate is low so I do not think she will tolerate much in the way of beta-blockade long-term.  We are going to bring her in for TY cardioversion.  The Eliquis is not financially feasible for her as they paid the $500 charge for a 1 month supply.  I will get her a free 30-day supply and we will keep her on apixaban until after cardioversion.  I think the only option for her is going to be Coumadin.  We will continue 12.5 mg of atenolol for now.  I will start the patient on an amiodarone load and she will remain on amiodarone at least for the first 4 weeks after cardioversion.  I will see her back in clinic 4 weeks after TY cardioversion.  2. Hypertension  3. Aortic regurgitation  4. Mitral regurgitation  5. Dyslipidemia    Return to clinic 30 days         Discharge Medications      ASK your doctor about these medications      Instructions Start Date   amiodarone 200 MG tablet  Commonly known as: PACERONE   200 mg, Oral, Daily, Take 400 mg twice daily for 1 week and then reduce dose to 200 mg once daily      amLODIPine 2.5 MG tablet  Commonly known as: NORVASC   2.5 mg, Oral, Daily      apixaban 5 MG tablet tablet  Commonly known as: ELIQUIS   5 mg, Oral, Every 12 Hours Scheduled      citalopram 20 MG tablet  Commonly known as: CeleXA   20 mg, Oral, Daily      lansoprazole 30 MG capsule  Commonly known as: PREVACID   30 mg, Oral, Daily      lisinopril 40 MG tablet  Commonly known as: PRINIVIL,ZESTRIL   40 mg, Oral,  Daily      lisinopril-hydrochlorothiazide 20-12.5 MG per tablet  Commonly known as: PRINZIDE,ZESTORETIC   1 tablet, Oral, Daily      metoprolol tartrate 50 MG tablet  Commonly known as: LOPRESSOR   50 mg, Oral, 2 Times Daily      raNITIdine 150 MG tablet  Commonly known as: ZANTAC   150 mg, Oral, Daily      simvastatin 40 MG tablet  Commonly known as: ZOCOR   40 mg, Oral, Nightly      VITAMIN D (CHOLECALCIFEROL) PO   Oral             Thank you for allowing me to participate in the care of your patient,      Sincerely,   Wilder Joseph MD  [facility]  04/25/22  07:18 EDT

## 2022-04-25 NOTE — PERIOPERATIVE NURSING NOTE
Phone call to Andrzej VAZQUEZ to request that Metoprolol prescription be escribed to patient's pharmacy at her request.  Prescription changed, patient informed and verbalized understanding.

## 2022-04-25 NOTE — ANESTHESIA POSTPROCEDURE EVALUATION
Patient: Berta Garay    Procedure Summary     Date: 04/25/22 Room / Location: [facility] PACU    Anesthesia Start: 0718 Anesthesia Stop: 0750    Procedures:       ADULT TRANSESOPHAGEAL ECHO (TY) W/ CONT IF NECESSARY PER PROTOCOL      CARDIOVERSION EXTERNAL IN CARDIOLOGY DEPARTMENT Diagnosis:       Atrial fibrillation, persistent (HCC)      (Cardiac Source of Emboli)      (persistent atrial fibrillation)    Scheduled Providers: Wilder Joseph Jr., MD Provider: Rich Waterman MD    Anesthesia Type: general ASA Status: 3          Anesthesia Type: general    Vitals  Vitals Value Taken Time   /71 04/25/22 0916   Temp     Pulse 40 04/25/22 0925   Resp 16 04/25/22 0915   SpO2 94 % 04/25/22 0925   Vitals shown include unvalidated device data.        Post Anesthesia Care and Evaluation    Patient location during evaluation: bedside  Patient participation: complete - patient participated  Level of consciousness: sleepy but conscious  Pain score: 0  Pain management: adequate  Airway patency: patent  Anesthetic complications: No anesthetic complications    Cardiovascular status: acceptable  Respiratory status: acceptable  Hydration status: acceptable    Comments: /65   Pulse (!) 45   Temp 36.7 °C (98.1 °F) (Oral)   Resp 16   SpO2 94%

## 2022-04-27 ENCOUNTER — NURSE TRIAGE (OUTPATIENT)
Dept: CALL CENTER | Facility: HOSPITAL | Age: 82
End: 2022-04-27

## 2022-04-27 NOTE — TELEPHONE ENCOUNTER
Caller, who is spouse, reports wife underwent TY and cardioversion on 04/25/2022 and was started on Metoprolol same day. Caller reports he and wife had several appt.s today and went out for lunch. States when returned home , wife stated she was very tired and has intermittent dry cough as she had experienced 04/25/2022 the evening after procedure. Caller denies wife to have any soa, chestpain or irregular HR. BP currently is 166/80 with HR of 69. When first returned home this afternoon, /83 with HR 58. Caller did state that patient had HR of 44 yesterday but denied any symptoms. States wife thinks overdid activity today. States appetite is good but did not drink much water today.  Guidelines followed, protocols reviewed. Encouraged to keep record of BP and HR and call cardiologist in a.m. Instructed further however to call back for any increase in cough or develops any other symptoms. Caller verbalized understanding./    Reason for Disposition  • Taking a medicine that could cause weakness (e.g., blood pressure medications, diuretics)    Additional Information  • Negative: SEVERE difficulty breathing (e.g., struggling for each breath, speaks in single words)  • Negative: Shock suspected (e.g., cold/pale/clammy skin, too weak to stand, low BP, rapid pulse)  • Negative: Difficult to awaken or acting confused (e.g., disoriented, slurred speech)  • Negative: [1] Fainted > 15 minutes ago AND [2] still feels too weak or dizzy to stand  • Negative: [1] SEVERE weakness (i.e., unable to walk or barely able to walk, requires support) AND [2] new-onset or worsening  • Negative: Sounds like a life-threatening emergency to the triager  • Negative: Weakness of the face, arm or leg on one side of the body  • Negative: [1] Diabetes mellitus AND [2] weakness from low blood sugar (i.e., < 60 mg/dl or 3.5 mmol/l)  • Negative: Heat exhaustion suspected (i.e., dehydration from heat exposure)  • Negative: Chest pain  • Negative:  "Vomiting is main symptom  • Negative: Diarrhea is main symptom  • Negative: Difficulty breathing  • Negative: Heart beating < 50 beats per minute OR > 140 beats per minute  • Negative: Extra heart beats OR irregular heart beating   (i.e., \"palpitations\")  • Negative: Follows bleeding (e.g., from vomiting, rectum, vagina; Exception: small brief weakness from sight of a small amount blood)  • Negative: Black or tarry bowel movements  • Negative: [1] Drinking very little AND [2] dehydration suspected (e.g., no urine > 12 hours, very dry mouth, very lightheaded)  • Negative: Patient sounds very sick or weak to the triager  • Negative: [1] MODERATE weakness (i.e., interferes with work, school, normal activities) AND [2] cause unknown  (Exceptions: weakness with acute minor illness, or weakness from poor fluid intake)  • Negative: [1] MODERATE weakness AND [2] from poor fluid intake AND [3] no improvement after 2 hours of rest and fluids  • Negative: [1] Fever > 103 F (39.4 C) AND [2] not able to get the fever down using Fever Care Advice  • Negative: [1] Fever > 101 F (38.3 C) AND [2] age > 60 years  • Negative: [1] Fever > 100.0 F (37.8 C) AND [2] bedridden (e.g., nursing home patient, CVA, chronic illness, recovering from surgery)  • Negative: [1] Fever > 100.0 F (37.8 C) AND [2] diabetes mellitus or weak immune system (e.g., HIV positive, cancer chemo, splenectomy, organ transplant, chronic steroids)  • Negative: Pale skin (pallor)  • Negative: [1] MODERATE weakness (i.e., interferes with work, school, normal activities) AND [2] persists > 3 days    Answer Assessment - Initial Assessment Questions  1. DESCRIPTION: \"Describe how you are feeling.\"      Weak and   2. SEVERITY: \"How bad is it?\"  \"Can you stand and walk?\"    - MILD - Feels weak or tired, but does not interfere with work, school or normal activities    - MODERATE - Able to stand and walk; weakness interferes with work, school, or normal activities    - " "SEVERE - Unable to stand or walk      Mild  3. ONSET:  \"When did the weakness begin?\"      Today after several appt. And eating lunch out.  4. CAUSE: \"What do you think is causing the weakness?\"      Over did on activity  5. MEDICINES: \"Have you recently started a new medicine or had a change in the amount of a medicine?\"      Metoprolol recently started  (04 /25/2022)  6. OTHER SYMPTOMS: \"Do you have any other symptoms?\" (e.g., chest pain, fever, cough, SOB, vomiting, diarrhea, bleeding, other areas of pain)      Intermittent dry cough  7. PREGNANCY: \"Is there any chance you are pregnant?\" \"When was your last menstrual period?\"      No    Protocols used: WEAKNESS (GENERALIZED) AND FATIGUE-ADULT-AH      "

## 2022-05-01 ENCOUNTER — NURSE TRIAGE (OUTPATIENT)
Dept: CALL CENTER | Facility: HOSPITAL | Age: 82
End: 2022-05-01

## 2022-05-01 NOTE — TELEPHONE ENCOUNTER
Caller states that his wife has had several medication changes in the last month.  Meds have been changed and added by PCP and ER MD.  Caller is wanting to make sure that she is on the correct meds.  Epic records reviewed with caller and recommend calling her cardiologist in the am and going over her medications with them, let the cardiologist decide the medications she should be on.  It is not clear if she is to still be taking norvasc or not. Voices understanding.  Questions answered.  Discussed side effects of beta blockers until patient adjusts to them.  Reason for Disposition  • [1] Caller has NON-URGENT medicine question about med that PCP prescribed AND [2] triager unable to answer question    Additional Information  • Negative: [1] Intentional drug overdose AND [2] suicidal thoughts or ideas  • Negative: Drug overdose and triager unable to answer question  • Negative: Caller requesting information unrelated to medicine  • Negative: Caller requesting information about COVID-19 Vaccine  • Negative: Caller requesting information about Emergency Contraception  • Negative: Caller requesting information about Combined Birth Control Pills  • Negative: Caller requesting information about Progestin Birth Control Pills  • Negative: Caller requesting information about Post-Op pain or medicines  • Negative: Caller requesting a prescription antibiotic (such as Penicillin) for Strep throat and has a positive culture result  • Negative: Caller requesting a prescription anti-viral med (such as Tamiflu) and has influenza (flu)  symptoms  • Negative: Immunization reaction suspected  • Negative: Rash while taking a medicine or within 3 days of stopping it  • Negative: [1] Asthma and [2] having symptoms of asthma (cough, wheezing, etc.)  • Negative: [1] Symptom of illness (e.g., headache, abdominal pain, earache, vomiting) AND [2] more than mild  • Negative: Breastfeeding questions about mother's medicines and diet  • Negative:  "MORE THAN A DOUBLE DOSE of a prescription or over-the-counter (OTC) drug  • Negative: [1] DOUBLE DOSE (an extra dose or lesser amount) of prescription drug AND [2] any symptoms (e.g., dizziness, nausea, pain, sleepiness)  • Negative: [1] DOUBLE DOSE (an extra dose or lesser amount) of over-the-counter (OTC) drug AND [2] any symptoms (e.g., dizziness, nausea, pain, sleepiness)  • Negative: Took another person's prescription drug  • Negative: [1] DOUBLE DOSE (an extra dose or lesser amount) of prescription drug AND [2] NO symptoms (Exception: a double dose of antibiotics)  • Negative: Diabetes drug error or overdose (e.g., took wrong type of insulin or took extra dose)  • Negative: [1] Prescription refill request for ESSENTIAL medicine (i.e., likelihood of harm to patient if not taken) AND [2] triager unable to refill per department policy  • Negative: [1] Prescription not at pharmacy AND [2] was prescribed by PCP recently (Exception: triager has access to EMR and prescription is recorded there. Go to Home Care and confirm for pharmacy.)  • Negative: [1] Pharmacy calling with prescription question AND [2] triager unable to answer question  • Negative: [1] Caller has URGENT medicine question about med that PCP or specialist prescribed AND [2] triager unable to answer question  • Negative: Medicine patch causing local rash or itching  • Negative: [1] Caller has medicine question about med NOT prescribed by PCP AND [2] triager unable to answer question (e.g., compatibility with other med, storage)  • Negative: Prescription request for new medicine (not a refill)  • Negative: Prescription refill request for a CONTROLLED substance (e.g., narcotics, ADHD medicines)  • Negative: [1] Prescription refill request for NON-ESSENTIAL medicine (i.e., no harm to patient if med not taken) AND [2] triager unable to refill per department policy    Answer Assessment - Initial Assessment Questions  1. NAME of MEDICATION: \"What medicine " "are you calling about?\"      She has had several medication changes and we want to make sure she is on the right ones.  2. QUESTION: \"What is your question?\" (e.g., medication refill, side effect)      See above  3. PRESCRIBING HCP: \"Who prescribed it?\" Reason: if prescribed by specialist, call should be referred to that group.      various  4. SYMPTOMS: \"Do you have any symptoms?\"      yes  5. SEVERITY: If symptoms are present, ask \"Are they mild, moderate or severe?\"      mild  6. PREGNANCY:  \"Is there any chance that you are pregnant?\" \"When was your last menstrual period?\"      na    Protocols used: MEDICATION QUESTION CALL-ADULT-    "

## 2022-05-02 RX ORDER — APIXABAN 5 MG/1
TABLET, FILM COATED ORAL
Qty: 60 TABLET | Refills: 0 | Status: SHIPPED | OUTPATIENT
Start: 2022-05-02 | End: 2022-06-06

## 2022-05-02 NOTE — TELEPHONE ENCOUNTER
Spoke to patient who is having increased fatigue with persistent bradycardia heart rate in the mid to high 40s.  Blood pressure has been elevated.  Stop amiodarone and metoprolol.  Restart amlodipine at 5 mg daily.  Patient will call back to discuss symptoms at the end of the week.

## 2022-05-23 ENCOUNTER — OFFICE VISIT (OUTPATIENT)
Dept: CARDIOLOGY | Facility: CLINIC | Age: 82
End: 2022-05-23

## 2022-05-23 VITALS
DIASTOLIC BLOOD PRESSURE: 70 MMHG | SYSTOLIC BLOOD PRESSURE: 148 MMHG | HEIGHT: 63 IN | WEIGHT: 132 LBS | HEART RATE: 61 BPM | BODY MASS INDEX: 23.39 KG/M2

## 2022-05-23 DIAGNOSIS — I48.19 ATRIAL FIBRILLATION, PERSISTENT: Primary | ICD-10-CM

## 2022-05-23 PROCEDURE — 99214 OFFICE O/P EST MOD 30 MIN: CPT | Performed by: NURSE PRACTITIONER

## 2022-05-23 PROCEDURE — 93000 ELECTROCARDIOGRAM COMPLETE: CPT | Performed by: NURSE PRACTITIONER

## 2022-05-23 RX ORDER — AMLODIPINE BESYLATE 5 MG/1
5 TABLET ORAL DAILY
Qty: 30 TABLET | Refills: 5 | Status: SHIPPED | OUTPATIENT
Start: 2022-05-23 | End: 2022-11-02

## 2022-05-23 NOTE — PROGRESS NOTES
Date of Office Visit: 22  Encounter Provider: TAYLOR Mehta  Place of Service: [facility] CARDIOLOGY  Patient Name: Berta Garay  :1940    Chief Complaint   Patient presents with   • Atrial fibrillation, persistent (   • Follow-up   :     HPI: Berta Garay is a 82 y.o. female  with hypertension, hyperlipidemia, atrial fibrillation, calcification of the aorta colon polyps, GERD.  She is followed by Dr. Wilder Joseph.  I will visit with her for the first time today have reviewed her medical record.  She has family history of coronary artery disease with her father having CABG in .  He also had atrial fibrillation.  In 2022 she had an echo which showed normal left ventricular systolic function, moderately dilated left atrium cavity, mild to moderate aortic valve regurgitation and mild to moderate mitral valve regurgitation.  She was found to be in atrial fibrillation at the ER in [place] and was started on atenolol.  She felt terrible with atenolol 25 mg daily and also had some hypotension which resulted  Maintained on 12.5 mg daily and started on apixaban 5 mg twice daily.    On 2022 she had transesophageal echocardiogram which showed no evidence of left atrial appendage thrombus with negative saline study.  There was mild to moderate concentric hypertrophy and normal left ventricular systolic function.  There was mild mitral regurgitation with calcification of the aortic valve without stenoses.  There was moderate mitral regurgitation.  She had evidence of aortic calcification.  She had successful cardioversion with synchronized 100 J biphasic shock.  Metoprolol and amiodarone were stopped on 2022 due to fatigue and frequent heart rates in the upper 40s.  2 days after she felt better.  She presents today for reassessment.  She denies chest pain or palpitations.  She is tolerating Eliquis.  She is now feeling better off amiodarone and metoprolol.   "She brought a list of her blood pressure readings which are 118-120s over 60-70s.  She is rare 130 systolic values at home.    Allergies   Allergen Reactions   • Sulfa Antibiotics Hives   • Cefdinir Rash   • Clarithromycin Rash           Family and social history reviewed.     ROS  All other systems were reviewed and are negative          Objective:     Vitals:    05/23/22 1115   BP: 148/70   BP Location: Left arm   Patient Position: Sitting   Pulse: 61   Weight: 59.9 kg (132 lb)   Height: 160 cm (63\")     Body mass index is 23.38 kg/m².    PHYSICAL EXAM:  Pulmonary:      Effort: Pulmonary effort is normal.      Breath sounds: Normal breath sounds.   Cardiovascular:      Normal rate. Regular rhythm.           ECG 12 Lead    Date/Time: 5/23/2022 11:33 AM  Performed by: Arianna Rasmussen APRN  Authorized by: Arianna Rasmussen APRN   Comparison: compared with previous ECG   Similar to previous ECG  Rhythm: sinus rhythm  Rate: normal  QRS axis: normal                Current Outpatient Medications   Medication Sig Dispense Refill   • amLODIPine (NORVASC) 5 MG tablet Take 1 tablet by mouth Daily. 30 tablet 5   • citalopram (CeleXA) 20 MG tablet Take 20 mg by mouth Daily.     • Eliquis 5 MG tablet tablet TAKE 1 TABLET BY MOUTH EVERY 12 HOURS 60 tablet 0   • lansoprazole (PREVACID) 30 MG capsule Take 30 mg by mouth Daily.     • lisinopril (PRINIVIL,ZESTRIL) 40 MG tablet Take 40 mg by mouth Daily.     • raNITIdine (ZANTAC) 150 MG tablet Take 150 mg by mouth Daily.     • simvastatin (ZOCOR) 40 MG tablet Take 40 mg by mouth Every Night.     • VITAMIN D, CHOLECALCIFEROL, PO Take  by mouth.       No current facility-administered medications for this visit.     Assessment:       Diagnosis Plan   1. Atrial fibrillation, persistent (HCC)  ECG 12 Lead        Orders Placed This Encounter   Procedures   • ECG 12 Lead     This order was created via procedure documentation     Order Specific Question:   Release to patient     Answer:   " Immediate         Plan:       1.  82-year-old female with persistent atrial fibrillation status post TY/cardioversion 4/25/2022.  She is currently off metoprolol and amiodarone due to bradycardia and fatigue.  She appears to be maintaining normal sinus rhythm.  At this time she will continue on Eliquis 5 mg twice daily and I gave her a month of samples.  2.  Hypertension blood pressure is a little elevated today but of no concern given her age.  Her home list reflects control at home.  Providing her values do not sustain above 145/85 would not make any changes  3.  Hyperlipidemia  4.  GERD  5.  History of colon polyps  6.  Cardiac murmur, mild to moderate mitral regurgitation on transesophageal echo April 2022 with aortic valve sclerosis and mild aortic valve regurgitation  7.  Aortic atherosclerosis on TY April 2022-currently not on aspirin given need for Eliquis      Follow-up in 6 to 8 weeks call with questions or concerns.      It has been a pleasure to participate in this patient's care.      Thank you,  TAYLOR Mehta      **I used Dragon to dictate this note:**

## 2022-06-06 RX ORDER — APIXABAN 5 MG/1
TABLET, FILM COATED ORAL
Qty: 60 TABLET | Refills: 0 | Status: SHIPPED | OUTPATIENT
Start: 2022-06-06 | End: 2022-07-11

## 2022-07-11 RX ORDER — APIXABAN 5 MG/1
TABLET, FILM COATED ORAL
Qty: 60 TABLET | Refills: 11 | Status: SHIPPED | OUTPATIENT
Start: 2022-07-11

## 2022-07-29 ENCOUNTER — OFFICE VISIT (OUTPATIENT)
Dept: CARDIOLOGY | Facility: CLINIC | Age: 82
End: 2022-07-29

## 2022-07-29 VITALS
HEIGHT: 63 IN | SYSTOLIC BLOOD PRESSURE: 150 MMHG | DIASTOLIC BLOOD PRESSURE: 80 MMHG | OXYGEN SATURATION: 98 % | WEIGHT: 138 LBS | BODY MASS INDEX: 24.45 KG/M2 | HEART RATE: 58 BPM

## 2022-07-29 DIAGNOSIS — I48.19 ATRIAL FIBRILLATION, PERSISTENT: Primary | ICD-10-CM

## 2022-07-29 PROCEDURE — 99214 OFFICE O/P EST MOD 30 MIN: CPT | Performed by: INTERNAL MEDICINE

## 2022-07-29 NOTE — PROGRESS NOTES
[facility]      Patient Name: Berta Garay  :1940  Age: 82 y.o.  Encounter Provider:  Wilder Joseph Jr, MD      Chief Complaint:   Chief Complaint   Patient presents with   • Atrial Fibrillation         Atrial Fibrillation  Symptoms include palpitations. Symptoms are negative for chest pain, dizziness and syncope. Past medical history includes atrial fibrillation.     Berta Garay is a 82 y.o. female with past medical history of hypertension, dyslipidemia and GERD who presents for initial evaluation of atrial fibrillation.  Patient had echocardiogram in late March which showed normal EF, moderately dilated left atrium, mild to moderate aortic and mitral regurgitation.  She had an episode of general malaise on 414 and presented to the ER in [place].  She was found to be in atrial fibrillation with RVR which improved after dose of beta-blocker.  Patient was started on atenolol but was given a dose of 50 mg which she was to cut in half.  With 25 mg of atenolol she was getting hypotensive and feeling terrible.  She cuts that in a quarter to get approximately 12.5 mg and is able to tolerate this fairly well.  She was started on apixaban and has no bleeding complications.  She is very active and her usual heart rate is low in the range of 50 to 60 bpm.  She denies any regular snoring or hypersomnia.  No angina with activity.  No orthopnea, PND or edema.  She was never a habitual smoker and drinks socially.  No family history of premature coronary artery disease or sudden cardiac death.    Successful cardioversion with the patient sent home on metoprolol and amiodarone.  Her heart rate and blood pressure were significantly lower after cardioversion so we had to hold metoprolol and amiodarone.  She saw TAYLOR Rasmussen shortly after the cardioversion and was doing well.  She continues to do well.  Good functional capacity with no limiting symptoms.  Off of negative chronotropic therapy  "her heart rate is 72 today in clinic with an elevated blood pressure but she brings in her blood pressure log from home which shows things are very well controlled.  She has no palpitations, dizziness syncope.  She is compliant with apixaban therapy although it is very expensive for her.      The following portions of the patient's history were reviewed and updated as appropriate: allergies, current medications, past family history, past medical history, past social history, past surgical history and problem list.      Review of Systems   Constitutional: Negative for chills and fever.   HENT: Negative for hoarse voice and sore throat.    Eyes: Negative for double vision and photophobia.   Cardiovascular: Positive for palpitations. Negative for chest pain, leg swelling, near-syncope, orthopnea, paroxysmal nocturnal dyspnea and syncope.   Respiratory: Negative for cough and wheezing.    Skin: Negative for poor wound healing and rash.   Musculoskeletal: Negative for arthritis and joint swelling.   Gastrointestinal: Negative for bloating, abdominal pain, hematemesis and hematochezia.   Neurological: Negative for dizziness and focal weakness.   Psychiatric/Behavioral: Negative for depression and suicidal ideas.       OBJECTIVE:   Vital Signs  Vitals:    07/29/22 1241   BP: 150/80   Pulse: 72   SpO2: 98%     Estimated body mass index is 24.45 kg/m² as calculated from the following:    Height as of this encounter: 160 cm (63\").    Weight as of this encounter: 62.6 kg (138 lb).    Vitals reviewed.   Constitutional:       Appearance: Healthy appearance. Not in distress.   Neck:      Vascular: No JVR. JVD normal.   Pulmonary:      Effort: Pulmonary effort is normal.      Breath sounds: Normal breath sounds. No wheezing. No rhonchi. No rales.   Chest:      Chest wall: Not tender to palpatation.   Cardiovascular:      PMI at left midclavicular line. Tachycardia present. Irregularly irregular rhythm. Normal S1. Normal S2.      " Murmurs: There is no murmur.      No gallop. No click. No rub.   Pulses:     Intact distal pulses.   Edema:     Peripheral edema absent.   Abdominal:      General: Bowel sounds are normal.      Palpations: Abdomen is soft.      Tenderness: There is no abdominal tenderness.   Musculoskeletal: Normal range of motion.         General: No tenderness. Skin:     General: Skin is warm and dry.   Neurological:      General: No focal deficit present.      Mental Status: Alert and oriented to person, place and time.         Procedures          ASSESSMENT:     Persistent atrial fibrillation  Hypertension  Dyslipidemia    PLAN OF CARE:     1. Paroxysmal atrial fibrillation -patient was very symptomatic with high ventricular rates.  Doing much better in sinus rhythm.  EKG today confirms maintenance of sinus rhythm.  We will continue the same.  I gave her the $10 co-pay card today which she will fill out and try to see if it is more financially feasible to stay on apixaban.  I gave her some samples today.  2. Hypertension  3. Aortic regurgitation  4. Mitral regurgitation  5. Dyslipidemia    Return to clinic 6 months         Discharge Medications          Accurate as of July 29, 2022 12:42 PM. If you have any questions, ask your nurse or doctor.            Continue These Medications      Instructions Start Date   amLODIPine 5 MG tablet  Commonly known as: NORVASC   5 mg, Oral, Daily      citalopram 20 MG tablet  Commonly known as: CeleXA   20 mg, Oral, Daily      Eliquis 5 MG tablet tablet  Generic drug: apixaban   TAKE 1 TABLET BY MOUTH EVERY 12 HOURS      lansoprazole 30 MG capsule  Commonly known as: PREVACID   30 mg, Oral, Daily      lisinopril 40 MG tablet  Commonly known as: PRINIVIL,ZESTRIL   40 mg, Oral, Daily      raNITIdine 150 MG tablet  Commonly known as: ZANTAC   150 mg, Oral, Daily      simvastatin 40 MG tablet  Commonly known as: ZOCOR   40 mg, Oral, Nightly      VITAMIN D (CHOLECALCIFEROL) PO   Oral              Thank you for allowing me to participate in the care of your patient,      Sincerely,   Wilder Joseph MD  [facility]  07/29/22  12:42 EDT

## 2022-11-02 ENCOUNTER — TELEPHONE (OUTPATIENT)
Dept: CARDIOLOGY | Facility: CLINIC | Age: 82
End: 2022-11-02

## 2022-11-02 ENCOUNTER — OFFICE VISIT (OUTPATIENT)
Dept: CARDIOLOGY | Facility: CLINIC | Age: 82
End: 2022-11-02

## 2022-11-02 VITALS
WEIGHT: 138 LBS | HEIGHT: 63 IN | BODY MASS INDEX: 24.45 KG/M2 | DIASTOLIC BLOOD PRESSURE: 74 MMHG | OXYGEN SATURATION: 94 % | SYSTOLIC BLOOD PRESSURE: 123 MMHG | HEART RATE: 96 BPM

## 2022-11-02 DIAGNOSIS — E78.5 HYPERLIPIDEMIA LDL GOAL <100: ICD-10-CM

## 2022-11-02 DIAGNOSIS — I10 BENIGN ESSENTIAL HTN: ICD-10-CM

## 2022-11-02 DIAGNOSIS — I48.91 ATRIAL FIBRILLATION WITH RVR: Primary | ICD-10-CM

## 2022-11-02 PROCEDURE — 93000 ELECTROCARDIOGRAM COMPLETE: CPT | Performed by: NURSE PRACTITIONER

## 2022-11-02 PROCEDURE — 99214 OFFICE O/P EST MOD 30 MIN: CPT | Performed by: NURSE PRACTITIONER

## 2022-11-02 RX ORDER — DILTIAZEM HYDROCHLORIDE 120 MG/1
120 CAPSULE, COATED, EXTENDED RELEASE ORAL DAILY
Qty: 30 CAPSULE | Refills: 11 | Status: SHIPPED | OUTPATIENT
Start: 2022-11-02 | End: 2023-03-20

## 2022-11-02 NOTE — TELEPHONE ENCOUNTER
----- Message from TAYLOR Blank sent at 11/1/2022 10:50 PM EDT -----  This patient called tonight with an increase in HR. Said she had a recent cardioversion. I suspect she is back in afib. She does mot have any rate control medications at home. She was anxious and I told her to rest and see if it came down. If staying up and not feeling well go to the ER. She is anticoagulated. I told her we would call to get her in the office tomorrow.

## 2022-11-02 NOTE — PROGRESS NOTES
Date of Office Visit: 2022  Encounter Provider: TAYLOR Blank  Place of Service: [facility] CARDIOLOGY  Patient Name: Berta Garay  :1940    Chief Complaint   Patient presents with   • Follow-up   • Atrial Fibrillation   :     HPI: Berta Garay is a 82 y.o. female who is a patient of  Dr. Joseph and is new to me today.  We see her for history of atrial fibrillation.  She has a history of hypertension, dyslipidemia and GERD.  In March of this year we saw her she had an echo that showed moderately dilated left atrium mild to moderate aortic and mitral valve insufficiency with normal EF.  She presented to the ER at [facility] in [place] with RVR.  She was put on atenolol 50 mg but had to be cut in half she was getting hypotensive and fatigued.  She now cut that into 11:45 0.5.  She was started on Eliquis for anticoagulation.  She underwent a successful cardioversion in July and was sent home on metoprolol and amiodarone.  She saw the nurse practitioner after that and was doing well.  She was taken off atenolol and amiodarone due to bradycardia.  After that she saw Dr. Joseph in the clinic and her blood pressure was a little bit elevated.  But her home log was stable.    Last night I was on call and she called with heart racing.  I brought her into the office today and she is in A. fib with RVR.  She does feel that her heart is racing but she is not short of breath her blood pressures have been in the low 100s she has been on amlodipine 5 mg a day.  Sometimes she gets a little lightheaded and she has been taking the amlodipine at night and that is helped.  Blood pressures are low 100s to 120s on her log.    Previous testing and notes have been reviewed by me.   Past Medical History:   Diagnosis Date   • Colon polyp    • GERD (gastroesophageal reflux disease)    • Hyperlipidemia    • Hypertension        Past Surgical History:   Procedure Laterality Date    • COLONOSCOPY     • COLONOSCOPY N/A 2018    Procedure: COLONOSCOPY w/ polypectomy;  Surgeon: Gilberto Stringer MD;  Location:  [facility] OR;  Service: Gastroenterology   • COLONOSCOPY W/ POLYPECTOMY N/A 11/15/2021    Procedure: COLONOSCOPY; POLYPECTOMY;  Surgeon: Gilberto Stringer MD;  Location:  [facility] OR;  Service: Gastroenterology;  Laterality: N/A;  POLYP  DIVERTICULOSIS   • ENDOSCOPY     • EYE SURGERY      Bilateral Cataracts       Social History     Socioeconomic History   • Marital status:    Tobacco Use   • Smoking status: Former     Types: Cigarettes     Quit date:      Years since quittin.8   • Smokeless tobacco: Never   • Tobacco comments:     caffeine - coffee, tea and soda   Substance and Sexual Activity   • Alcohol use: Yes     Alcohol/week: 1.0 standard drink     Types: 1 Glasses of wine per week     Comment: on occas.   • Drug use: No   • Sexual activity: Defer       Family History   Problem Relation Age of Onset   • Hypertension Mother    • Heart disease Father         cabg       Review of Systems   Constitutional: Negative for diaphoresis and malaise/fatigue.   Cardiovascular: Positive for palpitations. Negative for chest pain, claudication, dyspnea on exertion, irregular heartbeat, leg swelling, near-syncope, orthopnea, paroxysmal nocturnal dyspnea and syncope.   Respiratory: Negative for cough, shortness of breath and sleep disturbances due to breathing.    Musculoskeletal: Negative for falls.   Neurological: Negative for dizziness and weakness.   Psychiatric/Behavioral: Negative for altered mental status and substance abuse.       Allergies   Allergen Reactions   • Sulfa Antibiotics Hives   • Cefdinir Rash   • Clarithromycin Rash         Current Outpatient Medications:   •  citalopram (CeleXA) 20 MG tablet, Take 20 mg by mouth Daily., Disp: , Rfl:   •  Eliquis 5 MG tablet tablet, TAKE 1 TABLET BY MOUTH EVERY 12 HOURS, Disp: 60 tablet, Rfl: 11  •   "lansoprazole (PREVACID) 30 MG capsule, Take 30 mg by mouth Daily., Disp: , Rfl:   •  lisinopril (PRINIVIL,ZESTRIL) 40 MG tablet, Take 40 mg by mouth Daily., Disp: , Rfl:   •  raNITIdine (ZANTAC) 150 MG tablet, Take 150 mg by mouth Daily., Disp: , Rfl:   •  simvastatin (ZOCOR) 40 MG tablet, Take 40 mg by mouth Every Night., Disp: , Rfl:   •  VITAMIN D, CHOLECALCIFEROL, PO, Take  by mouth., Disp: , Rfl:   •  dilTIAZem CD (CARDIZEM CD) 120 MG 24 hr capsule, Take 1 capsule by mouth Daily., Disp: 30 capsule, Rfl: 11      Objective:     Vitals:    11/02/22 1103   BP: 123/74   Pulse: 96   SpO2: 94%   Weight: 62.6 kg (138 lb)   Height: 160 cm (63\")     Body mass index is 24.45 kg/m².    PHYSICAL EXAM:    Constitutional:       General: Not in acute distress.     Appearance: Normal appearance. Well-developed.   Eyes:      Pupils: Pupils are equal, round, and reactive to light.   HENT:      Head: Normocephalic.   Neck:      Vascular: No carotid bruit or JVD.   Pulmonary:      Effort: Pulmonary effort is normal. No tachypnea.      Breath sounds: Normal breath sounds. No wheezing. No rales.   Cardiovascular:      Normal rate. Irregularly irregular rhythm.      No gallop.   Pulses:     Intact distal pulses.   Edema:     Peripheral edema absent.   Abdominal:      General: Bowel sounds are normal.      Palpations: Abdomen is soft.      Tenderness: There is no abdominal tenderness.   Musculoskeletal: Normal range of motion.      Cervical back: Normal range of motion and neck supple. No edema. Skin:     General: Skin is warm and dry.   Neurological:      Mental Status: Alert and oriented to person, place, and time.           ECG 12 Lead    Date/Time: 11/2/2022 11:36 AM  Performed by: Lauren Griffith APRN  Authorized by: Lauren Griffith APRN   Comparison: compared with previous ECG from 7/29/2022  Comparison to previous ECG: Afib has replaced Sinus rhythm  Rhythm: atrial fibrillation  Rate: tachycardic  Q waves: V1 and V2    QRS " axis: normal  Other findings: non-specific ST-T wave changes    Clinical impression: abnormal EKG              Assessment:       Diagnosis Plan   1. Atrial fibrillation with RVR (HCC)        2. Benign essential HTN        3. Hyperlipidemia LDL goal <100          Orders Placed This Encounter   Procedures   • ECG 12 Lead     This order was created via procedure documentation     Order Specific Question:   Release to patient     Answer:   Routine Release          Plan:       I am going to stop her amlodipine and put her on diltiazem  mg daily.  She felt really symptomatic on beta-blockers and I think the amiodarone was also contributing to her not feeling well.  So I would really like to avoid those medications.  Continue with anticoagulation.  Next week if her rates are still up may send her to EP.         Your medication list          Accurate as of November 2, 2022 11:39 AM. If you have any questions, ask your nurse or doctor.            START taking these medications      Instructions Last Dose Given Next Dose Due   dilTIAZem  MG 24 hr capsule  Commonly known as: CARDIZEM CD  Started by: TAYLOR Blank      Take 1 capsule by mouth Daily.          CONTINUE taking these medications      Instructions Last Dose Given Next Dose Due   citalopram 20 MG tablet  Commonly known as: CeleXA      Take 20 mg by mouth Daily.       Eliquis 5 MG tablet tablet  Generic drug: apixaban      TAKE 1 TABLET BY MOUTH EVERY 12 HOURS       lansoprazole 30 MG capsule  Commonly known as: PREVACID      Take 30 mg by mouth Daily.       lisinopril 40 MG tablet  Commonly known as: PRINIVIL,ZESTRIL      Take 40 mg by mouth Daily.       raNITIdine 150 MG tablet  Commonly known as: ZANTAC      Take 150 mg by mouth Daily.       simvastatin 40 MG tablet  Commonly known as: ZOCOR      Take 40 mg by mouth Every Night.       VITAMIN D (CHOLECALCIFEROL) PO      Take  by mouth.          STOP taking these medications    amLODIPine 5 MG  tablet  Commonly known as: NORVASC  Stopped by: TAYLOR Blank              Where to Get Your Medications      These medications were sent to [facility]/pharmacy #[id number] - [place], [place] - [address] - [phone]  - [phone]   [address] [place] [address]    Phone: [phone]   · dilTIAZem  MG 24 hr capsule           As always, it has been a pleasure to participate in your patient's care.      Sincerely,     Lauren VAZQUEZ

## 2022-11-02 NOTE — TELEPHONE ENCOUNTER
Triage- please call patient and offer her an appointment today or tomorrow if HR is still elevated.

## 2022-11-08 ENCOUNTER — OFFICE VISIT (OUTPATIENT)
Dept: CARDIOLOGY | Facility: CLINIC | Age: 82
End: 2022-11-08

## 2022-11-08 VITALS
BODY MASS INDEX: 24.98 KG/M2 | DIASTOLIC BLOOD PRESSURE: 90 MMHG | WEIGHT: 141 LBS | HEIGHT: 63 IN | SYSTOLIC BLOOD PRESSURE: 163 MMHG | OXYGEN SATURATION: 97 % | HEART RATE: 83 BPM

## 2022-11-08 DIAGNOSIS — I10 BENIGN ESSENTIAL HTN: ICD-10-CM

## 2022-11-08 DIAGNOSIS — I48.91 ATRIAL FIBRILLATION WITH RVR: Primary | ICD-10-CM

## 2022-11-08 PROCEDURE — 93000 ELECTROCARDIOGRAM COMPLETE: CPT | Performed by: NURSE PRACTITIONER

## 2022-11-08 PROCEDURE — 99214 OFFICE O/P EST MOD 30 MIN: CPT | Performed by: NURSE PRACTITIONER

## 2022-11-08 NOTE — PROGRESS NOTES
Date of Office Visit: 2022  Encounter Provider: TAYLOR Blank  Place of Service: [facility] CARDIOLOGY  Patient Name: Berta Garay  :1940    Chief Complaint   Patient presents with   • Follow-up   • Hypertension   :     HPI:Berta Garay is a 82 y.o. female who is a patient of  Dr. Joseph and is known to me.  We see her for history of atrial fibrillation.  She has a history of hypertension, dyslipidemia and GERD.  In March of this year we saw her she had an echo that showed moderately dilated left atrium mild to moderate aortic and mitral valve insufficiency with normal EF.  She presented to the ER at [facility] in [facility] with RVR.  She was put on atenolol 50 mg but had to be cut in half she was getting hypotensive and fatigued.  She now cut that into 11:45 0.5.  She was started on Eliquis for anticoagulation.  She underwent a successful cardioversion in July and was sent home on metoprolol and amiodarone.  She saw the nurse practitioner after that and was doing well.  She was taken off atenolol and amiodarone due to bradycardia.  After that she saw Dr. Joseph in the clinic and her blood pressure was a little bit elevated.  But her home log was stable.    A week ago I was on call and she called the office with heart racing and high blood pressure.  I brought her in and she was in A. fib with RVR.  I switched her amlodipine over to diltiazem to help control her heart rate better.  She did not tolerate beta-blockers or amiodarone previously.    She comes in today her heart rate has been better at home.  Her blood pressure is also been better.  She is a little anxious today and her heart rate and blood pressure are up and I think that is why.  Sometimes when she is up moving around she feels tired.  She is a very active woman and walks every day.  She does feel kind of shaky and just not herself.  Her sister was also recently diagnosed with A. fib as  well.  Previous testing and notes have been reviewed by me.   Past Medical History:   Diagnosis Date   • Colon polyp    • GERD (gastroesophageal reflux disease)    • Hyperlipidemia    • Hypertension        Past Surgical History:   Procedure Laterality Date   • COLONOSCOPY     • COLONOSCOPY N/A 2018    Procedure: COLONOSCOPY w/ polypectomy;  Surgeon: Gilberto Stringer MD;  Location: [facility] OR;  Service: Gastroenterology   • COLONOSCOPY W/ POLYPECTOMY N/A 11/15/2021    Procedure: COLONOSCOPY; POLYPECTOMY;  Surgeon: Gilberto Stringer MD;  Location: [facility] OR;  Service: Gastroenterology;  Laterality: N/A;  POLYP  DIVERTICULOSIS   • ENDOSCOPY     • EYE SURGERY      Bilateral Cataracts       Social History     Socioeconomic History   • Marital status:    Tobacco Use   • Smoking status: Former     Types: Cigarettes     Quit date:      Years since quittin.8   • Smokeless tobacco: Never   • Tobacco comments:     caffeine - coffee, tea and soda   Substance and Sexual Activity   • Alcohol use: Yes     Alcohol/week: 1.0 standard drink     Types: 1 Glasses of wine per week     Comment: on occas.   • Drug use: No   • Sexual activity: Defer       Family History   Problem Relation Age of Onset   • Hypertension Mother    • Heart disease Father         cabg       Review of Systems   Constitutional: Positive for malaise/fatigue. Negative for diaphoresis.   Cardiovascular: Positive for irregular heartbeat. Negative for chest pain, claudication, dyspnea on exertion, leg swelling, near-syncope, orthopnea, palpitations, paroxysmal nocturnal dyspnea and syncope.   Respiratory: Negative for cough, shortness of breath and sleep disturbances due to breathing.    Musculoskeletal: Negative for falls.   Neurological: Negative for dizziness and weakness.   Psychiatric/Behavioral: Negative for altered mental status and substance abuse.       Allergies   Allergen Reactions   • Sulfa Antibiotics Hives   •  "Cefdinir Rash   • Clarithromycin Rash         Current Outpatient Medications:   •  citalopram (CeleXA) 20 MG tablet, Take 20 mg by mouth Daily., Disp: , Rfl:   •  dilTIAZem CD (CARDIZEM CD) 120 MG 24 hr capsule, Take 1 capsule by mouth Daily., Disp: 30 capsule, Rfl: 11  •  Eliquis 5 MG tablet tablet, TAKE 1 TABLET BY MOUTH EVERY 12 HOURS, Disp: 60 tablet, Rfl: 11  •  lansoprazole (PREVACID) 30 MG capsule, Take 30 mg by mouth Daily., Disp: , Rfl:   •  lisinopril (PRINIVIL,ZESTRIL) 40 MG tablet, Take 40 mg by mouth Daily., Disp: , Rfl:   •  raNITIdine (ZANTAC) 150 MG tablet, Take 150 mg by mouth Daily., Disp: , Rfl:   •  simvastatin (ZOCOR) 40 MG tablet, Take 40 mg by mouth Every Night., Disp: , Rfl:   •  VITAMIN D, CHOLECALCIFEROL, PO, Take  by mouth., Disp: , Rfl:       Objective:     Vitals:    11/08/22 1403   BP: 163/90   Pulse: 83   SpO2: 97%   Weight: 64 kg (141 lb)   Height: 160 cm (63\")     Body mass index is 24.98 kg/m².    PHYSICAL EXAM:    Constitutional:       General: Not in acute distress.     Appearance: Normal appearance. Well-developed.   Eyes:      Pupils: Pupils are equal, round, and reactive to light.   HENT:      Head: Normocephalic.   Neck:      Vascular: No carotid bruit or JVD.   Pulmonary:      Effort: Pulmonary effort is normal. No tachypnea.      Breath sounds: Normal breath sounds. No wheezing. No rales.   Cardiovascular:      Normal rate. Irregularly irregular rhythm.      No gallop.   Pulses:     Intact distal pulses.   Edema:     Peripheral edema absent.   Abdominal:      General: Bowel sounds are normal.      Palpations: Abdomen is soft.      Tenderness: There is no abdominal tenderness.   Musculoskeletal: Normal range of motion.      Cervical back: Normal range of motion and neck supple. No edema. Skin:     General: Skin is warm and dry.   Neurological:      Mental Status: Alert and oriented to person, place, and time.           ECG 12 Lead    Date/Time: 11/8/2022 2:15 PM  Performed " by: Lauren Griffith APRN  Authorized by: Lauren Griffith APRN   Comparison: compared with previous ECG from 11/2/2022  Similar to previous ECG  Rhythm: atrial fibrillation  Rate: tachycardic  Q waves: V1 and V2    QRS axis: normal    Clinical impression: abnormal EKG              Assessment:       Diagnosis Plan   1. Atrial fibrillation with RVR (HCC)        2. Benign essential HTN          No orders of the defined types were placed in this encounter.         Plan:       I spoke with Dr. Joseph to refer her to Dr. Zamudio for recommendations of A. fib control whether it be a ablation procedure or new antiarrhythmic suggestion.  I am not can change her medicines as I think for the majority her heart rate is better controlled.  Further recommendations pending his review.         Your medication list          Accurate as of November 8, 2022  2:13 PM. If you have any questions, ask your nurse or doctor.            CONTINUE taking these medications      Instructions Last Dose Given Next Dose Due   citalopram 20 MG tablet  Commonly known as: CeleXA      Take 20 mg by mouth Daily.       dilTIAZem  MG 24 hr capsule  Commonly known as: CARDIZEM CD      Take 1 capsule by mouth Daily.       Eliquis 5 MG tablet tablet  Generic drug: apixaban      TAKE 1 TABLET BY MOUTH EVERY 12 HOURS       lansoprazole 30 MG capsule  Commonly known as: PREVACID      Take 30 mg by mouth Daily.       lisinopril 40 MG tablet  Commonly known as: PRINIVIL,ZESTRIL      Take 40 mg by mouth Daily.       raNITIdine 150 MG tablet  Commonly known as: ZANTAC      Take 150 mg by mouth Daily.       simvastatin 40 MG tablet  Commonly known as: ZOCOR      Take 40 mg by mouth Every Night.       VITAMIN D (CHOLECALCIFEROL) PO      Take  by mouth.                As always, it has been a pleasure to participate in your patient's care.      Sincerely,     Lauren VAZQUEZ

## 2022-12-05 RX ORDER — AMLODIPINE BESYLATE 5 MG/1
TABLET ORAL
Qty: 90 TABLET | Refills: 1 | OUTPATIENT
Start: 2022-12-05

## 2022-12-09 ENCOUNTER — OFFICE VISIT (OUTPATIENT)
Dept: CARDIOLOGY | Facility: CLINIC | Age: 82
End: 2022-12-09

## 2022-12-09 VITALS
DIASTOLIC BLOOD PRESSURE: 90 MMHG | HEART RATE: 127 BPM | WEIGHT: 137 LBS | SYSTOLIC BLOOD PRESSURE: 138 MMHG | HEIGHT: 63 IN | BODY MASS INDEX: 24.27 KG/M2

## 2022-12-09 DIAGNOSIS — I48.91 ATRIAL FIBRILLATION WITH RVR: Primary | ICD-10-CM

## 2022-12-09 PROCEDURE — 93000 ELECTROCARDIOGRAM COMPLETE: CPT | Performed by: INTERNAL MEDICINE

## 2022-12-09 PROCEDURE — 99214 OFFICE O/P EST MOD 30 MIN: CPT | Performed by: INTERNAL MEDICINE

## 2022-12-09 NOTE — PROGRESS NOTES
Date of Office Visit: 2022  Encounter Provider: Wilder Zamudio MD  Place of Service: [facility] CARDIOLOGY  Patient Name: Berta Garay  : 1940    Subjective:     Encounter Date:2022      Patient ID: Berta Garay is a 82 y.o. female who has a cc of  New onset pers AF and she had CV in April and stayed in SR for 5 months, and first of Nov she went back to AF.     AF causes fatigue and leal and soa and low power. Cannot take BB         There have been no hospital admission since the last visit.     There have been no bleeding events.       Past Medical History:   Diagnosis Date   • Colon polyp    • GERD (gastroesophageal reflux disease)    • Hyperlipidemia    • Hypertension        Social History     Socioeconomic History   • Marital status:    Tobacco Use   • Smoking status: Former     Types: Cigarettes     Quit date:      Years since quittin.9   • Smokeless tobacco: Never   • Tobacco comments:     caffeine - coffee, tea and soda   Substance and Sexual Activity   • Alcohol use: Yes     Alcohol/week: 1.0 standard drink     Types: 1 Glasses of wine per week     Comment: on occas.   • Drug use: No   • Sexual activity: Defer       Family History   Problem Relation Age of Onset   • Hypertension Mother    • Heart disease Father         cabg       Review of Systems   Constitutional: Negative for fever and night sweats.   HENT: Negative for ear pain and stridor.    Eyes: Negative for discharge and visual halos.   Cardiovascular: Negative for cyanosis.   Respiratory: Negative for hemoptysis and sputum production.    Hematologic/Lymphatic: Negative for adenopathy.   Skin: Negative for nail changes and unusual hair distribution.   Musculoskeletal: Negative for gout and joint swelling.   Gastrointestinal: Negative for bowel incontinence and flatus.   Genitourinary: Negative for dysuria and flank pain.   Neurological: Negative for seizures and tremors.  "  Psychiatric/Behavioral: Negative for altered mental status. The patient is not nervous/anxious.             Objective:     Vitals:    12/09/22 1303   BP: 138/90   Pulse: (!) 127   Weight: 62.1 kg (137 lb)   Height: 160 cm (63\")         Eyes:      General:         Right eye: No discharge.         Left eye: No discharge.   HENT:      Head: Normocephalic and atraumatic.   Neck:      Thyroid: No thyromegaly.      Vascular: No JVD.   Pulmonary:      Effort: Pulmonary effort is normal.      Breath sounds: Normal breath sounds. No rales.   Cardiovascular:      Normal rate. Irregularly irregular rhythm.      No gallop.   Edema:     Peripheral edema absent.   Abdominal:      General: Bowel sounds are normal.      Palpations: Abdomen is soft.      Tenderness: There is no abdominal tenderness.   Musculoskeletal: Normal range of motion.         General: No deformity. Skin:     General: Skin is warm and dry.      Findings: No erythema.   Neurological:      Mental Status: Alert and oriented to person, place, and time.      Motor: Normal muscle tone.   Psychiatric:         Behavior: Behavior normal.         Thought Content: Thought content normal.           ECG 12 Lead    Date/Time: 12/9/2022 1:53 PM  Performed by: Wilder Zamudio MD  Authorized by: Wilder Zamudio MD   Comparison: compared with previous ECG   Similar to previous ECG  Rhythm: atrial fibrillation            Lab Review:       Assessment:          Diagnosis Plan   1. Atrial fibrillation with RVR (HCC)               Plan:     She has highly symptomatic AF -- and a rapid rate and I think she should consider rhythm control.     We disc ablation, risks benefits and expectations     She had transient success with amio and cv               "

## 2022-12-12 ENCOUNTER — TRANSCRIBE ORDERS (OUTPATIENT)
Dept: CARDIOLOGY | Facility: CLINIC | Age: 82
End: 2022-12-12

## 2022-12-12 DIAGNOSIS — Z13.6 SCREENING FOR ISCHEMIC HEART DISEASE: ICD-10-CM

## 2022-12-12 DIAGNOSIS — Z01.810 PRE-OPERATIVE CARDIOVASCULAR EXAMINATION: Primary | ICD-10-CM

## 2023-01-03 ENCOUNTER — LAB (OUTPATIENT)
Dept: LAB | Facility: HOSPITAL | Age: 83
End: 2023-01-03
Payer: MEDICARE

## 2023-01-03 DIAGNOSIS — Z01.810 PRE-OPERATIVE CARDIOVASCULAR EXAMINATION: ICD-10-CM

## 2023-01-03 DIAGNOSIS — Z13.6 SCREENING FOR ISCHEMIC HEART DISEASE: ICD-10-CM

## 2023-01-03 LAB
ANION GAP SERPL CALCULATED.3IONS-SCNC: 9.1 MMOL/L (ref 5–15)
BASOPHILS # BLD AUTO: 0.02 10*3/MM3 (ref 0–0.2)
BASOPHILS NFR BLD AUTO: 0.3 % (ref 0–1.5)
BUN SERPL-MCNC: 11 MG/DL (ref 8–23)
BUN/CREAT SERPL: 12.9 (ref 7–25)
CALCIUM SPEC-SCNC: 9.5 MG/DL (ref 8.6–10.5)
CHLORIDE SERPL-SCNC: 101 MMOL/L (ref 98–107)
CO2 SERPL-SCNC: 25.9 MMOL/L (ref 22–29)
CREAT SERPL-MCNC: 0.85 MG/DL (ref 0.57–1)
DEPRECATED RDW RBC AUTO: 41.2 FL (ref 37–54)
EGFRCR SERPLBLD CKD-EPI 2021: 68.5 ML/MIN/1.73
EOSINOPHIL # BLD AUTO: 0.11 10*3/MM3 (ref 0–0.4)
EOSINOPHIL NFR BLD AUTO: 1.8 % (ref 0.3–6.2)
ERYTHROCYTE [DISTWIDTH] IN BLOOD BY AUTOMATED COUNT: 12.6 % (ref 12.3–15.4)
GLUCOSE SERPL-MCNC: 115 MG/DL (ref 65–99)
HCT VFR BLD AUTO: 39 % (ref 34–46.6)
HGB BLD-MCNC: 12.7 G/DL (ref 12–15.9)
IMM GRANULOCYTES # BLD AUTO: 0.01 10*3/MM3 (ref 0–0.05)
IMM GRANULOCYTES NFR BLD AUTO: 0.2 % (ref 0–0.5)
LYMPHOCYTES # BLD AUTO: 1.52 10*3/MM3 (ref 0.7–3.1)
LYMPHOCYTES NFR BLD AUTO: 24.5 % (ref 19.6–45.3)
MCH RBC QN AUTO: 29.6 PG (ref 26.6–33)
MCHC RBC AUTO-ENTMCNC: 32.6 G/DL (ref 31.5–35.7)
MCV RBC AUTO: 90.9 FL (ref 79–97)
MONOCYTES # BLD AUTO: 0.47 10*3/MM3 (ref 0.1–0.9)
MONOCYTES NFR BLD AUTO: 7.6 % (ref 5–12)
NEUTROPHILS NFR BLD AUTO: 4.08 10*3/MM3 (ref 1.7–7)
NEUTROPHILS NFR BLD AUTO: 65.6 % (ref 42.7–76)
NRBC BLD AUTO-RTO: 0 /100 WBC (ref 0–0.2)
PLATELET # BLD AUTO: 315 10*3/MM3 (ref 140–450)
PMV BLD AUTO: 10.1 FL (ref 6–12)
POTASSIUM SERPL-SCNC: 3.9 MMOL/L (ref 3.5–5.2)
RBC # BLD AUTO: 4.29 10*6/MM3 (ref 3.77–5.28)
SODIUM SERPL-SCNC: 136 MMOL/L (ref 136–145)
WBC NRBC COR # BLD: 6.21 10*3/MM3 (ref 3.4–10.8)

## 2023-01-03 PROCEDURE — 80048 BASIC METABOLIC PNL TOTAL CA: CPT

## 2023-01-03 PROCEDURE — 85025 COMPLETE CBC W/AUTO DIFF WBC: CPT

## 2023-01-03 PROCEDURE — 36415 COLL VENOUS BLD VENIPUNCTURE: CPT

## 2023-01-10 ENCOUNTER — HOSPITAL ENCOUNTER (OUTPATIENT)
Facility: HOSPITAL | Age: 83
Discharge: HOME | End: 2023-01-11
Attending: INTERNAL MEDICINE | Admitting: INTERNAL MEDICINE
Payer: MEDICARE

## 2023-01-10 ENCOUNTER — ANESTHESIA (OUTPATIENT)
Dept: CARDIOLOGY | Facility: HOSPITAL | Age: 83
End: 2023-01-10
Payer: MEDICARE

## 2023-01-10 ENCOUNTER — ANESTHESIA EVENT (OUTPATIENT)
Dept: CARDIOLOGY | Facility: HOSPITAL | Age: 83
End: 2023-01-10
Payer: MEDICARE

## 2023-01-10 ENCOUNTER — APPOINTMENT (OUTPATIENT)
Dept: CARDIOLOGY | Facility: HOSPITAL | Age: 83
End: 2023-01-10
Payer: MEDICARE

## 2023-01-10 DIAGNOSIS — I48.91 ATRIAL FIBRILLATION WITH RVR: ICD-10-CM

## 2023-01-10 LAB
ACT BLD: 281 SECONDS (ref 82–152)
ACT BLD: 293 SECONDS (ref 82–152)
ACT BLD: 311 SECONDS (ref 82–152)
QT INTERVAL: 322 MS
QT INTERVAL: 393 MS

## 2023-01-10 PROCEDURE — C1732 CATH, EP, DIAG/ABL, 3D/VECT: HCPCS | Performed by: INTERNAL MEDICINE

## 2023-01-10 PROCEDURE — 93312 ECHO TRANSESOPHAGEAL: CPT | Performed by: INTERNAL MEDICINE

## 2023-01-10 PROCEDURE — G0378 HOSPITAL OBSERVATION PER HR: HCPCS

## 2023-01-10 PROCEDURE — C1893 INTRO/SHEATH, FIXED,NON-PEEL: HCPCS | Performed by: INTERNAL MEDICINE

## 2023-01-10 PROCEDURE — 93662 INTRACARDIAC ECG (ICE): CPT | Performed by: INTERNAL MEDICINE

## 2023-01-10 PROCEDURE — 92960 CARDIOVERSION ELECTRIC EXT: CPT | Performed by: INTERNAL MEDICINE

## 2023-01-10 PROCEDURE — 25010000002 PROPOFOL 10 MG/ML EMULSION: Performed by: ANESTHESIOLOGY

## 2023-01-10 PROCEDURE — 93325 DOPPLER ECHO COLOR FLOW MAPG: CPT

## 2023-01-10 PROCEDURE — S0260 H&P FOR SURGERY: HCPCS | Performed by: INTERNAL MEDICINE

## 2023-01-10 PROCEDURE — C1759 CATH, INTRA ECHOCARDIOGRAPHY: HCPCS | Performed by: INTERNAL MEDICINE

## 2023-01-10 PROCEDURE — 93005 ELECTROCARDIOGRAM TRACING: CPT | Performed by: INTERNAL MEDICINE

## 2023-01-10 PROCEDURE — C1894 INTRO/SHEATH, NON-LASER: HCPCS | Performed by: INTERNAL MEDICINE

## 2023-01-10 PROCEDURE — 93312 ECHO TRANSESOPHAGEAL: CPT

## 2023-01-10 PROCEDURE — 25010000002 PROTAMINE SULFATE PER 10 MG: Performed by: INTERNAL MEDICINE

## 2023-01-10 PROCEDURE — 93656 COMPRE EP EVAL ABLTJ ATR FIB: CPT | Performed by: INTERNAL MEDICINE

## 2023-01-10 PROCEDURE — 85347 COAGULATION TIME ACTIVATED: CPT

## 2023-01-10 PROCEDURE — 93320 DOPPLER ECHO COMPLETE: CPT

## 2023-01-10 PROCEDURE — A9270 NON-COVERED ITEM OR SERVICE: HCPCS | Performed by: INTERNAL MEDICINE

## 2023-01-10 PROCEDURE — 25010000002 PHENYLEPHRINE 10 MG/ML SOLUTION 5 ML VIAL: Performed by: ANESTHESIOLOGY

## 2023-01-10 PROCEDURE — C1730 CATH, EP, 19 OR FEW ELECT: HCPCS | Performed by: INTERNAL MEDICINE

## 2023-01-10 PROCEDURE — 25010000002 DEXAMETHASONE PER 1 MG: Performed by: ANESTHESIOLOGY

## 2023-01-10 PROCEDURE — 93010 ELECTROCARDIOGRAM REPORT: CPT | Performed by: INTERNAL MEDICINE

## 2023-01-10 PROCEDURE — 25010000002 HEPARIN (PORCINE) PER 1000 UNITS: Performed by: INTERNAL MEDICINE

## 2023-01-10 PROCEDURE — 63710000001 APIXABAN 5 MG TABLET: Performed by: INTERNAL MEDICINE

## 2023-01-10 PROCEDURE — 93325 DOPPLER ECHO COLOR FLOW MAPG: CPT | Performed by: INTERNAL MEDICINE

## 2023-01-10 PROCEDURE — 93320 DOPPLER ECHO COMPLETE: CPT | Performed by: INTERNAL MEDICINE

## 2023-01-10 RX ORDER — SODIUM CHLORIDE 0.9 % (FLUSH) 0.9 %
10 SYRINGE (ML) INJECTION EVERY 12 HOURS SCHEDULED
Status: DISCONTINUED | OUTPATIENT
Start: 2023-01-10 | End: 2023-01-10 | Stop reason: HOSPADM

## 2023-01-10 RX ORDER — SODIUM CHLORIDE 0.9 % (FLUSH) 0.9 %
3 SYRINGE (ML) INJECTION EVERY 12 HOURS SCHEDULED
Status: DISCONTINUED | OUTPATIENT
Start: 2023-01-10 | End: 2023-01-10

## 2023-01-10 RX ORDER — SODIUM CHLORIDE 0.9 % (FLUSH) 0.9 %
3-10 SYRINGE (ML) INJECTION AS NEEDED
Status: DISCONTINUED | OUTPATIENT
Start: 2023-01-10 | End: 2023-01-10

## 2023-01-10 RX ORDER — LIDOCAINE HYDROCHLORIDE 10 MG/ML
0.5 INJECTION, SOLUTION EPIDURAL; INFILTRATION; INTRACAUDAL; PERINEURAL ONCE AS NEEDED
Status: DISCONTINUED | OUTPATIENT
Start: 2023-01-10 | End: 2023-01-10

## 2023-01-10 RX ORDER — PROMETHAZINE HYDROCHLORIDE 25 MG/1
25 TABLET ORAL ONCE AS NEEDED
Status: DISCONTINUED | OUTPATIENT
Start: 2023-01-10 | End: 2023-01-10

## 2023-01-10 RX ORDER — DIPHENHYDRAMINE HCL 25 MG
25 CAPSULE ORAL
Status: DISCONTINUED | OUTPATIENT
Start: 2023-01-10 | End: 2023-01-10

## 2023-01-10 RX ORDER — EPHEDRINE SULFATE 50 MG/ML
5 INJECTION, SOLUTION INTRAVENOUS ONCE AS NEEDED
Status: DISCONTINUED | OUTPATIENT
Start: 2023-01-10 | End: 2023-01-10

## 2023-01-10 RX ORDER — ACETAMINOPHEN 650 MG/1
650 SUPPOSITORY RECTAL EVERY 4 HOURS PRN
Status: DISCONTINUED | OUTPATIENT
Start: 2023-01-10 | End: 2023-01-11 | Stop reason: HOSPADM

## 2023-01-10 RX ORDER — HYDROCODONE BITARTRATE AND ACETAMINOPHEN 5; 325 MG/1; MG/1
1 TABLET ORAL EVERY 4 HOURS PRN
Status: DISCONTINUED | OUTPATIENT
Start: 2023-01-10 | End: 2023-01-11 | Stop reason: HOSPADM

## 2023-01-10 RX ORDER — DEXAMETHASONE SODIUM PHOSPHATE 4 MG/ML
INJECTION, SOLUTION INTRA-ARTICULAR; INTRALESIONAL; INTRAMUSCULAR; INTRAVENOUS; SOFT TISSUE AS NEEDED
Status: DISCONTINUED | OUTPATIENT
Start: 2023-01-10 | End: 2023-01-10 | Stop reason: SURG

## 2023-01-10 RX ORDER — PROTAMINE SULFATE 10 MG/ML
INJECTION, SOLUTION INTRAVENOUS
Status: DISCONTINUED | OUTPATIENT
Start: 2023-01-10 | End: 2023-01-10 | Stop reason: HOSPADM

## 2023-01-10 RX ORDER — ROCURONIUM BROMIDE 10 MG/ML
INJECTION, SOLUTION INTRAVENOUS AS NEEDED
Status: DISCONTINUED | OUTPATIENT
Start: 2023-01-10 | End: 2023-01-10 | Stop reason: SURG

## 2023-01-10 RX ORDER — SODIUM CHLORIDE 0.9 % (FLUSH) 0.9 %
10 SYRINGE (ML) INJECTION AS NEEDED
Status: DISCONTINUED | OUTPATIENT
Start: 2023-01-10 | End: 2023-01-10 | Stop reason: HOSPADM

## 2023-01-10 RX ORDER — NITROGLYCERIN 0.4 MG/1
0.4 TABLET SUBLINGUAL
Status: DISCONTINUED | OUTPATIENT
Start: 2023-01-10 | End: 2023-01-11 | Stop reason: HOSPADM

## 2023-01-10 RX ORDER — FENTANYL CITRATE 50 UG/ML
50 INJECTION, SOLUTION INTRAMUSCULAR; INTRAVENOUS
Status: DISCONTINUED | OUTPATIENT
Start: 2023-01-10 | End: 2023-01-10

## 2023-01-10 RX ORDER — ACETAMINOPHEN 325 MG/1
650 TABLET ORAL EVERY 4 HOURS PRN
Status: DISCONTINUED | OUTPATIENT
Start: 2023-01-10 | End: 2023-01-11 | Stop reason: HOSPADM

## 2023-01-10 RX ORDER — LIDOCAINE HYDROCHLORIDE 20 MG/ML
INJECTION, SOLUTION INFILTRATION; PERINEURAL AS NEEDED
Status: DISCONTINUED | OUTPATIENT
Start: 2023-01-10 | End: 2023-01-10 | Stop reason: SURG

## 2023-01-10 RX ORDER — SODIUM CHLORIDE 9 MG/ML
75 INJECTION, SOLUTION INTRAVENOUS CONTINUOUS
Status: DISCONTINUED | OUTPATIENT
Start: 2023-01-10 | End: 2023-01-11

## 2023-01-10 RX ORDER — ONDANSETRON 2 MG/ML
4 INJECTION INTRAMUSCULAR; INTRAVENOUS ONCE AS NEEDED
Status: DISCONTINUED | OUTPATIENT
Start: 2023-01-10 | End: 2023-01-10

## 2023-01-10 RX ORDER — NALOXONE HCL 0.4 MG/ML
0.2 VIAL (ML) INJECTION AS NEEDED
Status: DISCONTINUED | OUTPATIENT
Start: 2023-01-10 | End: 2023-01-10

## 2023-01-10 RX ORDER — NALOXONE HCL 0.4 MG/ML
0.4 VIAL (ML) INJECTION
Status: DISCONTINUED | OUTPATIENT
Start: 2023-01-10 | End: 2023-01-11 | Stop reason: HOSPADM

## 2023-01-10 RX ORDER — HYDROMORPHONE HYDROCHLORIDE 1 MG/ML
0.5 INJECTION, SOLUTION INTRAMUSCULAR; INTRAVENOUS; SUBCUTANEOUS
Status: DISCONTINUED | OUTPATIENT
Start: 2023-01-10 | End: 2023-01-10

## 2023-01-10 RX ORDER — FLUMAZENIL 0.1 MG/ML
0.2 INJECTION INTRAVENOUS AS NEEDED
Status: DISCONTINUED | OUTPATIENT
Start: 2023-01-10 | End: 2023-01-10

## 2023-01-10 RX ORDER — DIPHENHYDRAMINE HYDROCHLORIDE 50 MG/ML
12.5 INJECTION INTRAMUSCULAR; INTRAVENOUS
Status: DISCONTINUED | OUTPATIENT
Start: 2023-01-10 | End: 2023-01-10

## 2023-01-10 RX ORDER — OXYCODONE AND ACETAMINOPHEN 7.5; 325 MG/1; MG/1
1 TABLET ORAL EVERY 4 HOURS PRN
Status: DISCONTINUED | OUTPATIENT
Start: 2023-01-10 | End: 2023-01-10

## 2023-01-10 RX ORDER — LABETALOL HYDROCHLORIDE 5 MG/ML
5 INJECTION, SOLUTION INTRAVENOUS
Status: DISCONTINUED | OUTPATIENT
Start: 2023-01-10 | End: 2023-01-10

## 2023-01-10 RX ORDER — HYDROMORPHONE HYDROCHLORIDE 1 MG/ML
0.5 INJECTION, SOLUTION INTRAMUSCULAR; INTRAVENOUS; SUBCUTANEOUS
Status: DISCONTINUED | OUTPATIENT
Start: 2023-01-10 | End: 2023-01-11 | Stop reason: HOSPADM

## 2023-01-10 RX ORDER — PANTOPRAZOLE SODIUM 40 MG/1
40 TABLET, DELAYED RELEASE ORAL
Status: DISCONTINUED | OUTPATIENT
Start: 2023-01-11 | End: 2023-01-11 | Stop reason: HOSPADM

## 2023-01-10 RX ORDER — LIDOCAINE HYDROCHLORIDE AND EPINEPHRINE 10; 10 MG/ML; UG/ML
INJECTION, SOLUTION INFILTRATION; PERINEURAL
Status: DISCONTINUED | OUTPATIENT
Start: 2023-01-10 | End: 2023-01-10 | Stop reason: HOSPADM

## 2023-01-10 RX ORDER — FAMOTIDINE 10 MG/ML
INJECTION, SOLUTION INTRAVENOUS AS NEEDED
Status: DISCONTINUED | OUTPATIENT
Start: 2023-01-10 | End: 2023-01-10 | Stop reason: SURG

## 2023-01-10 RX ORDER — SODIUM CHLORIDE, SODIUM LACTATE, POTASSIUM CHLORIDE, CALCIUM CHLORIDE 600; 310; 30; 20 MG/100ML; MG/100ML; MG/100ML; MG/100ML
9 INJECTION, SOLUTION INTRAVENOUS CONTINUOUS
Status: DISCONTINUED | OUTPATIENT
Start: 2023-01-10 | End: 2023-01-11

## 2023-01-10 RX ORDER — HYDROCODONE BITARTRATE AND ACETAMINOPHEN 7.5; 325 MG/1; MG/1
1 TABLET ORAL ONCE AS NEEDED
Status: DISCONTINUED | OUTPATIENT
Start: 2023-01-10 | End: 2023-01-10

## 2023-01-10 RX ORDER — HEPARIN SODIUM 1000 [USP'U]/ML
INJECTION, SOLUTION INTRAVENOUS; SUBCUTANEOUS
Status: DISCONTINUED | OUTPATIENT
Start: 2023-01-10 | End: 2023-01-10 | Stop reason: HOSPADM

## 2023-01-10 RX ORDER — FAMOTIDINE 10 MG/ML
20 INJECTION, SOLUTION INTRAVENOUS ONCE
Status: DISCONTINUED | OUTPATIENT
Start: 2023-01-10 | End: 2023-01-10

## 2023-01-10 RX ORDER — PROMETHAZINE HYDROCHLORIDE 25 MG/1
25 SUPPOSITORY RECTAL ONCE AS NEEDED
Status: DISCONTINUED | OUTPATIENT
Start: 2023-01-10 | End: 2023-01-10

## 2023-01-10 RX ORDER — HYDRALAZINE HYDROCHLORIDE 20 MG/ML
5 INJECTION INTRAMUSCULAR; INTRAVENOUS
Status: DISCONTINUED | OUTPATIENT
Start: 2023-01-10 | End: 2023-01-10

## 2023-01-10 RX ORDER — PROPOFOL 10 MG/ML
VIAL (ML) INTRAVENOUS AS NEEDED
Status: DISCONTINUED | OUTPATIENT
Start: 2023-01-10 | End: 2023-01-10 | Stop reason: SURG

## 2023-01-10 RX ADMIN — ROCURONIUM BROMIDE 30 MG: 50 INJECTION INTRAVENOUS at 16:58

## 2023-01-10 RX ADMIN — SODIUM CHLORIDE 75 ML/HR: 9 INJECTION, SOLUTION INTRAVENOUS at 13:04

## 2023-01-10 RX ADMIN — SUGAMMADEX 200 MG: 100 INJECTION, SOLUTION INTRAVENOUS at 17:58

## 2023-01-10 RX ADMIN — ROCURONIUM BROMIDE 20 MG: 50 INJECTION INTRAVENOUS at 16:20

## 2023-01-10 RX ADMIN — Medication 20 MG: at 15:04

## 2023-01-10 RX ADMIN — PHENYLEPHRINE HYDROCHLORIDE 0.1 MCG/KG/MIN: 10 INJECTION INTRAVENOUS at 15:33

## 2023-01-10 RX ADMIN — DEXAMETHASONE SODIUM PHOSPHATE 8 MG: 4 INJECTION, SOLUTION INTRA-ARTICULAR; INTRALESIONAL; INTRAMUSCULAR; INTRAVENOUS; SOFT TISSUE at 15:19

## 2023-01-10 RX ADMIN — APIXABAN 5 MG: 5 TABLET, FILM COATED ORAL at 21:34

## 2023-01-10 RX ADMIN — LIDOCAINE HYDROCHLORIDE 50 MG: 20 INJECTION, SOLUTION INFILTRATION; PERINEURAL at 15:08

## 2023-01-10 RX ADMIN — ROCURONIUM BROMIDE 30 MG: 50 INJECTION INTRAVENOUS at 15:08

## 2023-01-10 RX ADMIN — PROPOFOL 100 MG: 10 INJECTION, EMULSION INTRAVENOUS at 15:08

## 2023-01-10 NOTE — Clinical Note
Figure 8 suturing was used in achieving hemostasis. Closure device deployed in the vessel. Hemostasis achieved successfully.

## 2023-01-10 NOTE — H&P
82 y.o. female who has a cc of  New onset pers AF and she had CV in April and stayed in SR for 5 months, and first of Nov she went back to AF.     AF causes fatigue and leal and soa and low power. Cannot take BB           There have been no hospital admission since the last visit.      There have been no bleeding events.         Medical History        Past Medical History:   Diagnosis Date   • Colon polyp     • GERD (gastroesophageal reflux disease)     • Hyperlipidemia     • Hypertension              Social History   Social History            Socioeconomic History   • Marital status:    Tobacco Use   • Smoking status: Former       Types: Cigarettes       Quit date:        Years since quittin.9   • Smokeless tobacco: Never   • Tobacco comments:       caffeine - coffee, tea and soda   Substance and Sexual Activity   • Alcohol use: Yes       Alcohol/week: 1.0 standard drink       Types: 1 Glasses of wine per week       Comment: on occas.   • Drug use: No   • Sexual activity: Defer                  Family History   Problem Relation Age of Onset   • Hypertension Mother     • Heart disease Father           cabg         Review of Systems   Constitutional: Negative for fever and night sweats.   HENT: Negative for ear pain and stridor.    Eyes: Negative for discharge and visual halos.   Cardiovascular: Negative for cyanosis.   Respiratory: Negative for hemoptysis and sputum production.    Hematologic/Lymphatic: Negative for adenopathy.   Skin: Negative for nail changes and unusual hair distribution.   Musculoskeletal: Negative for gout and joint swelling.   Gastrointestinal: Negative for bowel incontinence and flatus.   Genitourinary: Negative for dysuria and flank pain.   Neurological: Negative for seizures and tremors.   Psychiatric/Behavioral: Negative for altered mental status. The patient is not nervous/anxious.                   Objective:      Vitals        Objective     Eyes:      General:          Right eye: No discharge.         Left eye: No discharge.   HENT:      Head: Normocephalic and atraumatic.   Neck:      Thyroid: No thyromegaly.      Vascular: No JVD.   Pulmonary:      Effort: Pulmonary effort is normal.      Breath sounds: Normal breath sounds. No rales.   Cardiovascular:      Normal rate. Irregularly irregular rhythm.      No gallop.   Edema:     Peripheral edema absent.   Abdominal:      General: Bowel sounds are normal.      Palpations: Abdomen is soft.      Tenderness: There is no abdominal tenderness.   Musculoskeletal: Normal range of motion.         General: No deformity. Skin:     General: Skin is warm and dry.      Findings: No erythema.   Neurological:      Mental Status: Alert and oriented to person, place, and time.      Motor: Normal muscle tone.   Psychiatric:         Behavior: Behavior normal.         Thought Content: Thought content normal.              Lab Review:         Assessment:      Assessment            Diagnosis Plan   1. Atrial fibrillation with RVR (HCC)                      Plan:      She has highly symptomatic AF -- and a rapid rate and I think she should consider rhythm control.     We disc ablation, risks benefits and expectations     She had transient success with amio and CV     Missed dose of a-ban so we will have to do TY

## 2023-01-10 NOTE — ANESTHESIA PREPROCEDURE EVALUATION
Anesthesia Evaluation     NPO Solid Status: > 8 hours             Airway   Mallampati: II  Dental      Pulmonary    (+) a smoker Former,   (-) asthma, sleep apnea    ROS comment: Negative patient screen for PRISCILLA    Cardiovascular     (+) hypertension, dysrhythmias Atrial Fib, hyperlipidemia,   (-) angina      Neuro/Psych  (-) CVA  GI/Hepatic/Renal/Endo    (+)  GERD,      Musculoskeletal     Abdominal    Substance History      OB/GYN          Other                        Anesthesia Plan    ASA 3     general       Anesthetic plan, risks, benefits, and alternatives have been provided, discussed and informed consent has been obtained with: patient.        CODE STATUS:

## 2023-01-10 NOTE — DISCHARGE INSTRUCTIONS
[facility]  Cardiology  [address] [place] [address]  [phone]    Coronary Ablation After Care    Refer to this sheet in the next few weeks. These instructions provide you with information on caring for yourself after your procedure. Your health care provider may also give you more specific instructions. Your treatment has been planned according to current medical practices, but problems sometimes occur. Call your health care provider if you have any problems or questions after your procedure.      What to Expect After the Procedure:  After your procedure, it is typical to have the following sensations:  Minor discomfort or tenderness and a small bump at the catheter insertion site(s). The bump(s) should usually decrease in size and tenderness within 1 to 2 weeks.  Any bruising will usually fade within 2 to 4 weeks.  Home Care Instructions:  Do not apply powder or lotion to the site.  Do not take baths, swim, or use a hot tub until your health care provider approves and the site is completely healed.  Do not bend, squat, or lift anything over 20 lb (9 kg) or as directed by your health care provider. However, we recommend lifting nothing heavier than a gallon of milk.    You may shower 24 hours after the procedure. Remove the bandage (dressing) and gently wash the site with plain soap and water. Gently pat the site dry. You may apply a band aid daily for 2 days if desired.    Inspect the site at least twice daily.  Increase your fluid intake for the next 2 days.    Limit your activity for the first 48 hours.   Avoid strenuous activity for 1 week or as advised by your physician.    Follow instructions about when you can drive or return to work as directed by your physician.    Hold direct pressure over the site when you cough, sneeze, laugh or change positions.  Do this for the next 2 days.    Call Your Doctor If:  You have drainage (other than a small amount of blood on the dressing).  You  have chills or a fever > 101.  You have redness, warmth, swelling (larger than a walnut), or pain at the insertion   You develop palpitations, chest pain or shortness of breath, feel faint, or pass out.  You develop pain, discoloration, coldness, numbness, tingling, or severe bruising in the leg that held the catheter.  You develop bleeding from any other place, such as the bowels.  You have heavy bleeding from the site.  If this happens, hold pressure on the site and call 911.        Make Sure You:  Understand these instructions.  Will watch your condition.  Will get help right away if you are not doing well or get worse.

## 2023-01-10 NOTE — ANESTHESIA PROCEDURE NOTES
Airway  Date/Time: 1/10/2023 3:15 PM  Difficult airway    General Information and Staff    Anesthesiologist: Rob Sharma MD    Indications and Patient Condition    Preoxygenated: yes  Mask difficulty assessment: 1 - vent by mask    Final Airway Details  Final airway type: endotracheal airway      Successful airway: ETT  Cuffed: yes   Successful intubation technique: direct laryngoscopy  Facilitating devices/methods: anterior pressure/BURP and intubating stylet  Endotracheal tube insertion site: oral  Blade: Penaloza  Blade size: 2  ETT size (mm): 7.0  Cormack-Lehane Classification: grade IIb - view of arytenoids or posterior of glottis only  Placement verified by: chest auscultation and capnometry   Measured from: teeth  ETT/EBT  to teeth (cm): 21  Number of attempts at approach: 2    Additional Comments  #8 tooth scratched during laryngoscopy.  Otherwise lips, teeth, gums same as preop

## 2023-01-11 VITALS
BODY MASS INDEX: 22.15 KG/M2 | SYSTOLIC BLOOD PRESSURE: 153 MMHG | HEART RATE: 95 BPM | WEIGHT: 125 LBS | TEMPERATURE: 99 F | HEIGHT: 63 IN | RESPIRATION RATE: 17 BRPM | DIASTOLIC BLOOD PRESSURE: 95 MMHG | OXYGEN SATURATION: 93 %

## 2023-01-11 LAB
BH CV ECHO MEAS - MR MAX PG: 98.2 MMHG
BH CV ECHO MEAS - MR MAX VEL: 495.6 CM/SEC
BH CV ECHO MEAS - MR MEAN PG: 64 MMHG
BH CV ECHO MEAS - MR MEAN VEL: 377.8 CM/SEC
BH CV ECHO MEAS - MR VTI: 118.7 CM
BH CV ECHO MEAS - RVSP: 27 MMHG
BH CV VAS BP RIGHT ARM: NORMAL MMHG
MAXIMAL PREDICTED HEART RATE: 138 BPM
STRESS TARGET HR: 117 BPM

## 2023-01-11 PROCEDURE — 99238 HOSP IP/OBS DSCHRG MGMT 30/<: CPT | Performed by: NURSE PRACTITIONER

## 2023-01-11 PROCEDURE — A9270 NON-COVERED ITEM OR SERVICE: HCPCS | Performed by: INTERNAL MEDICINE

## 2023-01-11 PROCEDURE — 63710000001 APIXABAN 5 MG TABLET: Performed by: INTERNAL MEDICINE

## 2023-01-11 PROCEDURE — 63710000001 PANTOPRAZOLE 40 MG TABLET DELAYED-RELEASE: Performed by: INTERNAL MEDICINE

## 2023-01-11 PROCEDURE — G0378 HOSPITAL OBSERVATION PER HR: HCPCS

## 2023-01-11 RX ADMIN — PANTOPRAZOLE SODIUM 40 MG: 40 TABLET, DELAYED RELEASE ORAL at 06:34

## 2023-01-11 RX ADMIN — APIXABAN 5 MG: 5 TABLET, FILM COATED ORAL at 09:42

## 2023-01-11 NOTE — DISCHARGE SUMMARY
DISCHARGE NOTE    Patient Name: Berta Garay  Age/Sex: 82 y.o. female  : 1940  MRN: [id number]    Date of Discharge:  2023   Date of Admit: 1/10/2023  Encounter Provider: TAYLOR Sharma  Place of Service: [facility] CARDIOLOGY  Patient Care Team:  Josesito Lopez MD as PCP - General (Family Medicine)    Subjective:     Discharge Diagnosis:    Atrial fibrillation with RVR ([facility])      Hospital Course:     82 yr old with new onset persistent afib, had CV in April and stayed in SR for 5 months then had recurrence in early Nov--symptomatic and cannot take BB.     S/p successful PV isolation for elimination of AF.     She had missed doses of her apixaban so she had a TY prior to ablation---she had no thrombus.     She tolerated the procedure well and is stable for dc home.     She has had some short runs of non specific AT but no sustained AF.     Follow up in the office in 4 weeks.     Vital Signs  Temp:  [97.8 °F (36.6 °C)-98.4 °F (36.9 °C)] 98 °F (36.7 °C)  Heart Rate:  [85-93] 92  Resp:  [16-17] 17  BP: (127-151)/(76-95) 149/90    Intake/Output Summary (Last 24 hours) at 2023 1643  Last data filed at 2023 0851  Gross per 24 hour   Intake 830 ml   Output --   Net 830 ml       Physical Exam:    General Appearance: No acute distress, well developed and well nourished.   Eyes: Conjunctiva and lids: No erythema, swelling, or discharge. Sclera non-icteric.   HENT: Atraumatic, normocephalic. External eyes, ears, and nose normal.   Respiratory: No signs of respiratory distress. Respiration rhythm and depth normal.   Clear to auscultation. No rales, crackles, rhonchi, or wheezing auscultated.   Cardiovascular:  Heart Rate and Rhythm: Normal, Heart Sounds: Normal S1 and S2. No S3 or S4 noted.  Murmurs: No murmurs noted. No rubs, thrills, or gallops.   Lower  Extremities: No edema noted.  Gastrointestinal:  Abdomen soft, non-distended, non-tender.  Musculoskeletal: Normal movement of extremities  Skin: Warm and dry.   Psychiatric: Patient alert and oriented to person, place, and time. Speech and behavior appropriate. Normal mood and affect.    Discharge Diet:    Dietary Orders (From admission, onward)     Start     Ordered    01/10/23 1817  Diet: Regular/House Diet; Texture: Regular Texture (IDDSI 7); Fluid Consistency: Thin (IDDSI 0)  Diet Effective Now        References:    Diet Order [place]   Question Answer Comment   Diets: Regular/House Diet    Texture: Regular Texture (IDDSI 7)    Fluid Consistency: Thin (IDDSI 0)        01/10/23 1817                  Activity at Discharge: Instructions given to patient  Activity Instructions    Activity as tolerated            Discharge Medications     Discharge Medications      Continue These Medications      Instructions Start Date   citalopram 20 MG tablet  Commonly known as: CeleXA   20 mg, Oral, Daily      dilTIAZem  MG 24 hr capsule  Commonly known as: CARDIZEM CD   120 mg, Oral, Daily      Eliquis 5 MG tablet tablet  Generic drug: apixaban   TAKE 1 TABLET BY MOUTH EVERY 12 HOURS      lansoprazole 30 MG capsule  Commonly known as: PREVACID   30 mg, Oral, Daily      lisinopril 40 MG tablet  Commonly known as: PRINIVIL,ZESTRIL   40 mg, Oral, Daily      simvastatin 40 MG tablet  Commonly known as: ZOCOR   40 mg, Oral, Nightly      VITAMIN D (CHOLECALCIFEROL) PO   Oral             Discharge disposition: home    Follow-up Appointments   Follow-up Information     Astrid Blackwell APRN Follow up in 4 week(s).    Specialty: Cardiology  Contact information:  [address]  [facility] [address] [phone]             Josesito Lopez MD .    Specialty: Family Medicine  Contact information:  [address] [facility] [address] [phone]                       Future Appointments   Date Time Provider  Department Center   2/3/2023 11:00 AM Arianna Rasmussen, TAYLOR [facility] TAYLOR Obrien  01/11/23  16:43 EST

## 2023-01-11 NOTE — NURSING NOTE
Pt ambulated in hallway multiple times today. Groin sites soft no s/sx of bleeidng noted.Discharge instructions reviewed. All questions answered.  vss Awaiting transport

## 2023-01-14 NOTE — ANESTHESIA POSTPROCEDURE EVALUATION
"Patient: Berta Garay    Procedure Summary     Date: 01/10/23 Room / Location: [facility] CATH/EP LAB 5 /  [facility] CATH INVASIVE LOCATION    Anesthesia Start: 1501 Anesthesia Stop: 1810    Procedures:       Ablation atrial fibrillation      3D MAPPING CARTO EP Diagnosis:       Atrial fibrillation with RVR (HCC)      (aF)    Providers: Wilder Zamudio MD Provider: Rob Sharma MD    Anesthesia Type: general ASA Status: 3          Anesthesia Type: general    Vitals  Vitals Value Taken Time   /78 01/10/23 1830   Temp 36.9 °C (98.4 °F) 01/10/23 1808   Pulse 90 01/10/23 1830   Resp 16 01/10/23 1830   SpO2 99 % 01/10/23 1830           Post Anesthesia Care and Evaluation    Pain management: adequate    Airway patency: patent  Anesthetic complications: No anesthetic complications    Cardiovascular status: acceptable  Respiratory status: acceptable  Hydration status: acceptable    Comments: /95 (BP Location: Right arm, Patient Position: Sitting)   Pulse 95   Temp 37.2 °C (99 °F) (Oral)   Resp 17   Ht 160 cm (63\")   Wt 56.7 kg (125 lb)   SpO2 93%   BMI 22.14 kg/m²         "

## 2023-02-08 ENCOUNTER — OFFICE VISIT (OUTPATIENT)
Dept: CARDIOLOGY | Facility: CLINIC | Age: 83
End: 2023-02-08
Payer: MEDICARE

## 2023-02-08 VITALS
DIASTOLIC BLOOD PRESSURE: 70 MMHG | WEIGHT: 136 LBS | HEART RATE: 117 BPM | SYSTOLIC BLOOD PRESSURE: 118 MMHG | HEIGHT: 63 IN | BODY MASS INDEX: 24.1 KG/M2

## 2023-02-08 DIAGNOSIS — Z98.890 H/O CARDIAC RADIOFREQUENCY ABLATION: ICD-10-CM

## 2023-02-08 DIAGNOSIS — I10 BENIGN ESSENTIAL HTN: ICD-10-CM

## 2023-02-08 DIAGNOSIS — I48.91 ATRIAL FIBRILLATION WITH RVR: Primary | ICD-10-CM

## 2023-02-08 PROCEDURE — 99214 OFFICE O/P EST MOD 30 MIN: CPT | Performed by: NURSE PRACTITIONER

## 2023-02-08 PROCEDURE — 93000 ELECTROCARDIOGRAM COMPLETE: CPT | Performed by: NURSE PRACTITIONER

## 2023-02-08 RX ORDER — MULTIPLE VITAMINS W/ MINERALS TAB 9MG-400MCG
1 TAB ORAL DAILY
COMMUNITY

## 2023-02-08 NOTE — PROGRESS NOTES
Date of Office Visit: 2023  Encounter Provider: TAYLOR Sharma  Place of Service: [facility] CARDIOLOGY  Patient Name: Berta Garay  :1940    Chief Complaint   Patient presents with   • Atrial Fibrillation w/RVR   • s/p ablation 1/10   :     HPI: Berta Garay is a 82 y.o. female who follows with Dr. Joseph---had new onset persistent AF in ---TY/CV  In April---stayed in SR for 5 months, recurred in early Nov.    Symptomatic---can not take beta blockers, okay on CCB.     Saw Dr. Zamudio---had TY/PVI 2023. She had missed a few doses of AC---EF 66-70%, mild AI,mod MRShivani     Presents for post procedure follow up.     She is accompanied by her .     She is doing good. She has been walking about a 1 quarter mile (she was walking 2 miles) but she did not want to push it---she has no chest pain, dyspnea, PND, orthopnea or edema.     She never felt the afib/irregularity and has not felt it since ablation.     Apixaban for AC--no issues.      Past Medical History:   Diagnosis Date   • Atrial fibrillation (HCC)    • Colon polyp    • GERD (gastroesophageal reflux disease)    • Hyperlipidemia    • Hypertension        Past Surgical History:   Procedure Laterality Date   • CARDIAC ELECTROPHYSIOLOGY PROCEDURE N/A 1/10/2023    Procedure: Ablation atrial fibrillation;  Surgeon: Wilder Zamudio MD;  Location: [facility] INVASIVE LOCATION;  Service: Cardiovascular;  Laterality: N/A;   • COLONOSCOPY     • COLONOSCOPY N/A 2018    Procedure: COLONOSCOPY w/ polypectomy;  Surgeon: Gilberto Stringer MD;  Location: [facility] OR;  Service: Gastroenterology   • COLONOSCOPY W/ POLYPECTOMY N/A 11/15/2021    Procedure: COLONOSCOPY; POLYPECTOMY;  Surgeon: Gilberto Stringer MD;  Location: [facility] OR;  Service: Gastroenterology;  Laterality: N/A;  POLYP  DIVERTICULOSIS   • ENDOSCOPY     • EYE SURGERY      Bilateral Cataracts       Social History      Socioeconomic History   • Marital status:    Tobacco Use   • Smoking status: Former     Types: Cigarettes     Quit date:      Years since quittin.1   • Smokeless tobacco: Never   • Tobacco comments:     caffeine - coffee, tea and soda   Vaping Use   • Vaping Use: Never used   Substance and Sexual Activity   • Alcohol use: Yes     Alcohol/week: 1.0 standard drink     Types: 1 Glasses of wine per week     Comment: on occas.   • Drug use: No   • Sexual activity: Defer       Family History   Problem Relation Age of Onset   • Hypertension Mother    • Heart disease Father         cabg       Review of Systems   Constitutional: Negative for chills, fever and malaise/fatigue.   Cardiovascular: Negative for chest pain, dyspnea on exertion, leg swelling, near-syncope, orthopnea, palpitations, paroxysmal nocturnal dyspnea and syncope.   Respiratory: Negative for cough and shortness of breath.    Hematologic/Lymphatic: Negative.    Musculoskeletal: Negative for joint pain, joint swelling and myalgias.   Gastrointestinal: Negative for abdominal pain, diarrhea, melena, nausea and vomiting.   Genitourinary: Negative for frequency and hematuria.   Neurological: Negative for light-headedness, numbness, paresthesias and seizures.   Allergic/Immunologic: Negative.    All other systems reviewed and are negative.      Allergies   Allergen Reactions   • Sulfa Antibiotics Hives   • Cefdinir Rash   • Clarithromycin Rash         Current Outpatient Medications:   •  citalopram (CeleXA) 20 MG tablet, Take 20 mg by mouth Daily., Disp: , Rfl:   •  dilTIAZem CD (CARDIZEM CD) 120 MG 24 hr capsule, Take 1 capsule by mouth Daily., Disp: 30 capsule, Rfl: 11  •  Eliquis 5 MG tablet tablet, TAKE 1 TABLET BY MOUTH EVERY 12 HOURS, Disp: 60 tablet, Rfl: 11  •  lansoprazole (PREVACID) 30 MG capsule, Take 30 mg by mouth Daily., Disp: , Rfl:   •  lisinopril (PRINIVIL,ZESTRIL) 40 MG tablet, Take 40 mg by mouth Daily., Disp: , Rfl:   •   "multivitamin with minerals tablet tablet, Take 1 tablet by mouth Daily., Disp: , Rfl:   •  simvastatin (ZOCOR) 40 MG tablet, Take 40 mg by mouth Every Night., Disp: , Rfl:   •  VITAMIN D, CHOLECALCIFEROL, PO, Take  by mouth., Disp: , Rfl:       Objective:     Vitals:    02/08/23 0934   BP: 118/70   Pulse: 117   Weight: 61.7 kg (136 lb)   Height: 160 cm (63\")     Body mass index is 24.09 kg/m².    PHYSICAL EXAM:    Vitals Reviewed.   General Appearance: No acute distress, well developed and well nourished.   Eyes: Conjunctiva and lids: No erythema, swelling, or discharge. Sclera non-icteric.   HENT: Atraumatic, normocephalic. External eyes, ears, and nose normal.   Respiratory: No signs of respiratory distress. Respiration rhythm and depth normal.   Clear to auscultation. No rales, crackles, rhonchi, or wheezing auscultated.   Cardiovascular:  Heart Rate and Rhythm: Normal, Heart Sounds: Normal S1 and S2. No S3 or S4 noted.  Murmurs: No murmurs noted. No rubs, thrills, or gallops.   Arterial Pulses:  Posterior tibialis and dorsalis pedis pulses normal.   Lower Extremities: No edema noted.  Gastrointestinal:  Abdomen soft, non-distended, non-tender.   Musculoskeletal: Normal movement of extremities  Skin: Warm and dry.   Psychiatric: Patient alert and oriented to person, place, and time. Speech and behavior appropriate. Normal mood and affect.       ECG 12 Lead    Date/Time: 2/8/2023 10:12 AM  Performed by: Astrid Blackwell APRN  Authorized by: Astrid Blackwell APRN   Comparison: compared with previous ECG   Comparison to previous ECG: Afib replaces SR  Rhythm: atrial fibrillation  BPM: 117                Assessment:       Diagnosis Plan   1. Atrial fibrillation with RVR (HCC)  ECG 12 Lead    Holter Monitor - 48 Hour      2. H/O cardiac radiofrequency ablation--PVI 1/2023        3. Benign essential HTN               Plan:       1.-2. Persistent AF, s/p PVI 1/11/2023---in AF today, she was unaware but she did not feel " the irregularity before. She has not been checking HR or b/p because we told her not to check it--until we saw her back. She is on apixaban and diltiazem.     I am going to check 48 hr Holter.     3. HTN, controlled.     Will call with Holter results and further recommendations. Will schedule with Dr. Zamudio in 3 months but may need to adjust pending Holter results.       As always, it has been a pleasure to participate in your patient's care.      Sincerely,         TAYLOR Tafoya

## 2023-02-17 ENCOUNTER — LAB (OUTPATIENT)
Dept: LAB | Facility: HOSPITAL | Age: 83
End: 2023-02-17
Payer: MEDICARE

## 2023-02-17 ENCOUNTER — TELEPHONE (OUTPATIENT)
Dept: CARDIOLOGY | Facility: CLINIC | Age: 83
End: 2023-02-17
Payer: MEDICARE

## 2023-02-17 ENCOUNTER — TRANSCRIBE ORDERS (OUTPATIENT)
Dept: CARDIOLOGY | Facility: CLINIC | Age: 83
End: 2023-02-17

## 2023-02-17 DIAGNOSIS — Z13.6 SCREENING FOR ISCHEMIC HEART DISEASE: ICD-10-CM

## 2023-02-17 DIAGNOSIS — I48.91 ATRIAL FIBRILLATION WITH RVR: Primary | ICD-10-CM

## 2023-02-17 DIAGNOSIS — Z13.6 SCREENING FOR ISCHEMIC HEART DISEASE: Primary | ICD-10-CM

## 2023-02-17 LAB
ANION GAP SERPL CALCULATED.3IONS-SCNC: 12.3 MMOL/L (ref 5–15)
BUN SERPL-MCNC: 13 MG/DL (ref 8–23)
BUN/CREAT SERPL: 15.5 (ref 7–25)
CALCIUM SPEC-SCNC: 10 MG/DL (ref 8.6–10.5)
CHLORIDE SERPL-SCNC: 98 MMOL/L (ref 98–107)
CO2 SERPL-SCNC: 24.7 MMOL/L (ref 22–29)
CREAT SERPL-MCNC: 0.84 MG/DL (ref 0.57–1)
EGFRCR SERPLBLD CKD-EPI 2021: 69.5 ML/MIN/1.73
GLUCOSE SERPL-MCNC: 93 MG/DL (ref 65–99)
POTASSIUM SERPL-SCNC: 4 MMOL/L (ref 3.5–5.2)
SODIUM SERPL-SCNC: 135 MMOL/L (ref 136–145)

## 2023-02-17 PROCEDURE — 80048 BASIC METABOLIC PNL TOTAL CA: CPT

## 2023-02-17 PROCEDURE — 36415 COLL VENOUS BLD VENIPUNCTURE: CPT

## 2023-02-17 NOTE — TELEPHONE ENCOUNTER
Monitor showed persistent AF avg      Discussed with Dr. Zamudio, just had ablation in Jan---will try CV and see how she does    Will have her increase her dilt to 120 mg BID until CV

## 2023-02-20 ENCOUNTER — HOSPITAL ENCOUNTER (OUTPATIENT)
Dept: CARDIOLOGY | Facility: HOSPITAL | Age: 83
Discharge: HOME | End: 2023-02-20
Admitting: INTERNAL MEDICINE
Payer: MEDICARE

## 2023-02-20 VITALS
HEART RATE: 79 BPM | HEIGHT: 63 IN | DIASTOLIC BLOOD PRESSURE: 79 MMHG | TEMPERATURE: 97.6 F | SYSTOLIC BLOOD PRESSURE: 129 MMHG | OXYGEN SATURATION: 93 % | BODY MASS INDEX: 23.04 KG/M2 | RESPIRATION RATE: 18 BRPM | WEIGHT: 130 LBS

## 2023-02-20 DIAGNOSIS — I48.91 ATRIAL FIBRILLATION WITH RVR: ICD-10-CM

## 2023-02-20 LAB
QT INTERVAL: 320 MS
QT INTERVAL: 399 MS

## 2023-02-20 PROCEDURE — 92960 CARDIOVERSION ELECTRIC EXT: CPT

## 2023-02-20 PROCEDURE — 93010 ELECTROCARDIOGRAM REPORT: CPT | Performed by: STUDENT IN AN ORGANIZED HEALTH CARE EDUCATION/TRAINING PROGRAM

## 2023-02-20 PROCEDURE — 92960 CARDIOVERSION ELECTRIC EXT: CPT | Performed by: INTERNAL MEDICINE

## 2023-02-20 PROCEDURE — 93005 ELECTROCARDIOGRAM TRACING: CPT | Performed by: NURSE PRACTITIONER

## 2023-02-20 PROCEDURE — 93005 ELECTROCARDIOGRAM TRACING: CPT | Performed by: INTERNAL MEDICINE

## 2023-02-20 RX ORDER — SODIUM CHLORIDE 9 MG/ML
75 INJECTION, SOLUTION INTRAVENOUS CONTINUOUS
Status: DISCONTINUED | OUTPATIENT
Start: 2023-02-20 | End: 2023-02-21 | Stop reason: HOSPADM

## 2023-02-20 RX ORDER — SODIUM CHLORIDE 0.9 % (FLUSH) 0.9 %
10 SYRINGE (ML) INJECTION AS NEEDED
Status: DISCONTINUED | OUTPATIENT
Start: 2023-02-20 | End: 2023-02-21 | Stop reason: HOSPADM

## 2023-02-20 RX ORDER — SODIUM CHLORIDE 9 MG/ML
40 INJECTION, SOLUTION INTRAVENOUS AS NEEDED
Status: DISCONTINUED | OUTPATIENT
Start: 2023-02-20 | End: 2023-02-21 | Stop reason: HOSPADM

## 2023-02-20 RX ORDER — SODIUM CHLORIDE 0.9 % (FLUSH) 0.9 %
10 SYRINGE (ML) INJECTION EVERY 12 HOURS SCHEDULED
Status: DISCONTINUED | OUTPATIENT
Start: 2023-02-20 | End: 2023-02-21 | Stop reason: HOSPADM

## 2023-02-20 RX ADMIN — SODIUM CHLORIDE 75 ML/HR: 9 INJECTION, SOLUTION INTRAVENOUS at 13:34

## 2023-02-20 RX ADMIN — Medication 55 MG: at 15:23

## 2023-02-20 NOTE — NURSING NOTE
Dr. Zamudio at bedside to speak with patient and patient spouse post procedure.    Rotation Flap Text: The defect edges were debeveled with a #15 scalpel blade.  Given the location of the defect, shape of the defect and the proximity to free margins a rotation flap was deemed most appropriate.  Using a sterile surgical marker, an appropriate rotation flap was drawn incorporating the defect and placing the expected incisions within the relaxed skin tension lines where possible.    The area thus outlined was incised deep to adipose tissue with a #15 scalpel blade.  The skin margins were undermined to an appropriate distance in all directions utilizing iris scissors.

## 2023-02-21 LAB
MAXIMAL PREDICTED HEART RATE: 138 BPM
STRESS TARGET HR: 117 BPM

## 2023-03-20 ENCOUNTER — TRANSCRIBE ORDERS (OUTPATIENT)
Dept: CARDIOLOGY | Facility: CLINIC | Age: 83
End: 2023-03-20
Payer: MEDICARE

## 2023-03-20 ENCOUNTER — OFFICE VISIT (OUTPATIENT)
Dept: CARDIOLOGY | Facility: CLINIC | Age: 83
End: 2023-03-20
Payer: MEDICARE

## 2023-03-20 VITALS
BODY MASS INDEX: 24.1 KG/M2 | HEART RATE: 105 BPM | HEIGHT: 63 IN | WEIGHT: 136 LBS | DIASTOLIC BLOOD PRESSURE: 86 MMHG | SYSTOLIC BLOOD PRESSURE: 124 MMHG

## 2023-03-20 DIAGNOSIS — I10 BENIGN ESSENTIAL HTN: ICD-10-CM

## 2023-03-20 DIAGNOSIS — Z01.810 PRE-OPERATIVE CARDIOVASCULAR EXAMINATION: Primary | ICD-10-CM

## 2023-03-20 DIAGNOSIS — Z98.890 H/O CARDIAC RADIOFREQUENCY ABLATION: ICD-10-CM

## 2023-03-20 DIAGNOSIS — Z13.6 SCREENING FOR ISCHEMIC HEART DISEASE: ICD-10-CM

## 2023-03-20 DIAGNOSIS — I48.91 ATRIAL FIBRILLATION WITH RVR: Primary | ICD-10-CM

## 2023-03-20 PROCEDURE — 93000 ELECTROCARDIOGRAM COMPLETE: CPT | Performed by: NURSE PRACTITIONER

## 2023-03-20 PROCEDURE — 3074F SYST BP LT 130 MM HG: CPT | Performed by: NURSE PRACTITIONER

## 2023-03-20 PROCEDURE — 99214 OFFICE O/P EST MOD 30 MIN: CPT | Performed by: NURSE PRACTITIONER

## 2023-03-20 PROCEDURE — 3079F DIAST BP 80-89 MM HG: CPT | Performed by: NURSE PRACTITIONER

## 2023-03-20 RX ORDER — ATORVASTATIN CALCIUM 40 MG/1
40 TABLET, FILM COATED ORAL DAILY
Qty: 30 TABLET | Refills: 5 | COMMUNITY
Start: 2023-02-13 | End: 2023-08-12

## 2023-03-20 RX ORDER — DILTIAZEM HYDROCHLORIDE 120 MG/1
120 CAPSULE, COATED, EXTENDED RELEASE ORAL 2 TIMES DAILY
Qty: 60 CAPSULE | Refills: 11 | Status: ON HOLD | OUTPATIENT
Start: 2023-03-20 | End: 2023-03-31 | Stop reason: SDUPTHER

## 2023-03-20 NOTE — PROGRESS NOTES
Date of Office Visit: 2023  Encounter Provider: TAYLOR Sharma  Place of Service: [facility] CARDIOLOGY  Patient Name: Berta Garay  :1940    Chief Complaint   Patient presents with   • Atrial Fibrillation w/RVR   • s/p cardioversion    :     HPI: Berta Garay is a 82 y.o. female who follows with Dr. Joseph---new onset persistent AF in , TY/CV 2022---stayed in SR for 5 months, recurred in early Nov, symptomatic---intolerant to beta blockers, able to tolerate CCB.     Saw Dr. Zamudio, s/p TY/PVI 2023, EF 66-70%, mild AI, mod MR. PAULA saw her  for follow up and she felt good but was in AF w/RVR, --she has never really felt her irregular heart beat, increased dilt and did a 48Hr monitor which showed persistent AF---CV 2023.     She had successful CV on .    Presents for follow up.     She feels good but thinks she has been back in afib for awhile. She went to PCP last week, checked HR, it was low 100's. She tries not to check her b/p or HR too often---so she doesn't drive herself crazy.     No chest pain, dyspnea, PND, orthopnea, dizziness or edema.    Apixaban for AC---no missed doses.      Past Medical History:   Diagnosis Date   • Atrial fibrillation (HCC)    • Colon polyp    • GERD (gastroesophageal reflux disease)    • Hyperlipidemia    • Hypertension        Past Surgical History:   Procedure Laterality Date   • CARDIAC ELECTROPHYSIOLOGY PROCEDURE N/A 1/10/2023    Procedure: Ablation atrial fibrillation;  Surgeon: Wilder Zamudio MD;  Location: [facility] INVASIVE LOCATION;  Service: Cardiovascular;  Laterality: N/A;   • COLONOSCOPY     • COLONOSCOPY N/A 2018    Procedure: COLONOSCOPY w/ polypectomy;  Surgeon: Gilberto Stringer MD;  Location: [facility] OR;  Service: Gastroenterology   • COLONOSCOPY W/ POLYPECTOMY N/A 11/15/2021    Procedure: COLONOSCOPY; POLYPECTOMY;  Surgeon: Gilberto Stringer MD;   Location: [facility];  Service: Gastroenterology;  Laterality: N/A;  POLYP  DIVERTICULOSIS   • ENDOSCOPY     • EYE SURGERY      Bilateral Cataracts       Social History     Socioeconomic History   • Marital status:    Tobacco Use   • Smoking status: Former     Types: Cigarettes     Quit date:      Years since quittin.2   • Smokeless tobacco: Never   • Tobacco comments:     caffeine - coffee, tea and soda   Vaping Use   • Vaping Use: Never used   Substance and Sexual Activity   • Alcohol use: Yes     Alcohol/week: 1.0 standard drink     Types: 1 Glasses of wine per week     Comment: on occas.   • Drug use: No   • Sexual activity: Defer       Family History   Problem Relation Age of Onset   • Hypertension Mother    • Heart disease Father         cabg       Review of Systems   Constitutional: Negative for chills, fever and malaise/fatigue.   Cardiovascular: Negative for chest pain, dyspnea on exertion, leg swelling, near-syncope, orthopnea, palpitations, paroxysmal nocturnal dyspnea and syncope.   Respiratory: Negative for cough and shortness of breath.    Hematologic/Lymphatic: Negative.    Musculoskeletal: Negative for joint pain, joint swelling and myalgias.   Gastrointestinal: Negative for abdominal pain, diarrhea, melena, nausea and vomiting.   Genitourinary: Negative for frequency and hematuria.   Neurological: Negative for light-headedness, numbness, paresthesias and seizures.   Allergic/Immunologic: Negative.    All other systems reviewed and are negative.      Allergies   Allergen Reactions   • Sulfa Antibiotics Hives   • Cefdinir Rash   • Clarithromycin Rash         Current Outpatient Medications:   •  atorvastatin (LIPITOR) 40 MG tablet, Take 1 tablet by mouth Daily., Disp: 30 tablet, Rfl: 5  •  citalopram (CeleXA) 20 MG tablet, Take 1 tablet by mouth Daily., Disp: , Rfl:   •  dilTIAZem CD (CARDIZEM CD) 120 MG 24 hr capsule, Take 1 capsule by mouth Daily., Disp: 30 capsule, Rfl: 11  •  Eliquis  "5 MG tablet tablet, TAKE 1 TABLET BY MOUTH EVERY 12 HOURS, Disp: 60 tablet, Rfl: 11  •  lansoprazole (PREVACID) 30 MG capsule, Take 1 capsule by mouth Daily., Disp: , Rfl:   •  lisinopril (PRINIVIL,ZESTRIL) 40 MG tablet, Take 1 tablet by mouth Daily., Disp: , Rfl:   •  multivitamin with minerals tablet tablet, Take 1 tablet by mouth Daily., Disp: , Rfl:   •  tretinoin (RETIN-A) 0.025 % cream, Apply  topically to the appropriate area as directed See Admin Instructions., Disp: , Rfl:   •  VITAMIN D, CHOLECALCIFEROL, PO, Take  by mouth., Disp: , Rfl:       Objective:     Vitals:    03/20/23 1008   BP: 124/86   Pulse: (!) 142   Weight: 61.7 kg (136 lb)   Height: 160 cm (63\")     Body mass index is 24.09 kg/m².    PHYSICAL EXAM:    Vitals Reviewed.   General Appearance: No acute distress, well developed and well nourished.   Eyes: Conjunctiva and lids: No erythema, swelling, or discharge. Sclera non-icteric.   HENT: Atraumatic, normocephalic. External eyes, ears, and nose normal.   Respiratory: No signs of respiratory distress. Respiration rhythm and depth normal.   Clear to auscultation. No rales, crackles, rhonchi, or wheezing auscultated.   Cardiovascular:  Heart Rate and Rhythm: Irregularly, irregular. Heart Sounds:  S1 and S2.   Murmurs: No murmurs noted. No rubs, thrills, or gallops.   Lower Extremities: No edema noted.  Gastrointestinal:  Abdomen soft, non-distended, non-tender.   Musculoskeletal: Normal movement of extremities  Skin: Warm and dry.   Psychiatric: Patient alert and oriented to person, place, and time. Speech and behavior appropriate. Normal mood and affect.       ECG 12 Lead    Date/Time: 3/20/2023 11:00 AM  Performed by: Astrid Blackwell APRN  Authorized by: Astrid Blackwell APRN   Comparison: compared with previous ECG   Comparison to previous ECG: AF replaces SR  Rhythm: atrial fibrillation  BPM: 142                Assessment:       Diagnosis Plan   1. Atrial fibrillation with RVR (HCC)        2. " H/O cardiac radiofrequency ablation--PVI 1/2023        3. Benign essential HTN               Plan:       1.-2. Persistent afib, s/p PVI 1/10/2023, recurrence, s/p CV 2/20---in afib today 's--she feels okay, not sure when it recurred post CV but HR was low 100's last week at PCP office. Her HR on recheck was 105 with pulse ox---will leave her on diltiazem 120 mg BID (it says daily but she never decreased to daily). I have ask hert check HR a couple of times per week until ablation and call if persistently elevated---consider increasing dilt or adding digoxin. Apixaban for AC---has not missed any doses. Will try to get her scheduled ASAP.     3. HTN, controlled.     As always, it has been a pleasure to participate in your patient's care.      Sincerely,         TAYLOR Tafoya

## 2023-03-20 NOTE — H&P (VIEW-ONLY)
Date of Office Visit: 2023  Encounter Provider: TAYLOR Sharma  Place of Service: [facility] CARDIOLOGY  Patient Name: Berta Garay  :1940    Chief Complaint   Patient presents with   • Atrial Fibrillation w/RVR   • s/p cardioversion    :     HPI: Berta Garay is a 82 y.o. female who follows with Dr. Joseph---new onset persistent AF in , TY/CV 2022---stayed in SR for 5 months, recurred in early Nov, symptomatic---intolerant to beta blockers, able to tolerate CCB.     Saw Dr. Zamudio, s/p TY/PVI 2023, EF 66-70%, mild AI, mod MR. PAULA saw her  for follow up and she felt good but was in AF w/RVR, --she has never really felt her irregular heart beat, increased dilt and did a 48Hr monitor which showed persistent AF---CV 2023.     She had successful CV on .    Presents for follow up.     She feels good but thinks she has been back in afib for awhile. She went to PCP last week, checked HR, it was low 100's. She tries not to check her b/p or HR too often---so she doesn't drive herself crazy.     No chest pain, dyspnea, PND, orthopnea, dizziness or edema.    Apixaban for AC---no missed doses.      Past Medical History:   Diagnosis Date   • Atrial fibrillation (HCC)    • Colon polyp    • GERD (gastroesophageal reflux disease)    • Hyperlipidemia    • Hypertension        Past Surgical History:   Procedure Laterality Date   • CARDIAC ELECTROPHYSIOLOGY PROCEDURE N/A 1/10/2023    Procedure: Ablation atrial fibrillation;  Surgeon: Wilder Zamudio MD;  Location: [facility] INVASIVE LOCATION;  Service: Cardiovascular;  Laterality: N/A;   • COLONOSCOPY     • COLONOSCOPY N/A 2018    Procedure: COLONOSCOPY w/ polypectomy;  Surgeon: Gilberto Stringer MD;  Location: [facility] OR;  Service: Gastroenterology   • COLONOSCOPY W/ POLYPECTOMY N/A 11/15/2021    Procedure: COLONOSCOPY; POLYPECTOMY;  Surgeon: Gilberto Stringer MD;   Location: [facility];  Service: Gastroenterology;  Laterality: N/A;  POLYP  DIVERTICULOSIS   • ENDOSCOPY     • EYE SURGERY      Bilateral Cataracts       Social History     Socioeconomic History   • Marital status:    Tobacco Use   • Smoking status: Former     Types: Cigarettes     Quit date:      Years since quittin.2   • Smokeless tobacco: Never   • Tobacco comments:     caffeine - coffee, tea and soda   Vaping Use   • Vaping Use: Never used   Substance and Sexual Activity   • Alcohol use: Yes     Alcohol/week: 1.0 standard drink     Types: 1 Glasses of wine per week     Comment: on occas.   • Drug use: No   • Sexual activity: Defer       Family History   Problem Relation Age of Onset   • Hypertension Mother    • Heart disease Father         cabg       Review of Systems   Constitutional: Negative for chills, fever and malaise/fatigue.   Cardiovascular: Negative for chest pain, dyspnea on exertion, leg swelling, near-syncope, orthopnea, palpitations, paroxysmal nocturnal dyspnea and syncope.   Respiratory: Negative for cough and shortness of breath.    Hematologic/Lymphatic: Negative.    Musculoskeletal: Negative for joint pain, joint swelling and myalgias.   Gastrointestinal: Negative for abdominal pain, diarrhea, melena, nausea and vomiting.   Genitourinary: Negative for frequency and hematuria.   Neurological: Negative for light-headedness, numbness, paresthesias and seizures.   Allergic/Immunologic: Negative.    All other systems reviewed and are negative.      Allergies   Allergen Reactions   • Sulfa Antibiotics Hives   • Cefdinir Rash   • Clarithromycin Rash         Current Outpatient Medications:   •  atorvastatin (LIPITOR) 40 MG tablet, Take 1 tablet by mouth Daily., Disp: 30 tablet, Rfl: 5  •  citalopram (CeleXA) 20 MG tablet, Take 1 tablet by mouth Daily., Disp: , Rfl:   •  dilTIAZem CD (CARDIZEM CD) 120 MG 24 hr capsule, Take 1 capsule by mouth Daily., Disp: 30 capsule, Rfl: 11  •  Eliquis  "5 MG tablet tablet, TAKE 1 TABLET BY MOUTH EVERY 12 HOURS, Disp: 60 tablet, Rfl: 11  •  lansoprazole (PREVACID) 30 MG capsule, Take 1 capsule by mouth Daily., Disp: , Rfl:   •  lisinopril (PRINIVIL,ZESTRIL) 40 MG tablet, Take 1 tablet by mouth Daily., Disp: , Rfl:   •  multivitamin with minerals tablet tablet, Take 1 tablet by mouth Daily., Disp: , Rfl:   •  tretinoin (RETIN-A) 0.025 % cream, Apply  topically to the appropriate area as directed See Admin Instructions., Disp: , Rfl:   •  VITAMIN D, CHOLECALCIFEROL, PO, Take  by mouth., Disp: , Rfl:       Objective:     Vitals:    03/20/23 1008   BP: 124/86   Pulse: (!) 142   Weight: 61.7 kg (136 lb)   Height: 160 cm (63\")     Body mass index is 24.09 kg/m².    PHYSICAL EXAM:    Vitals Reviewed.   General Appearance: No acute distress, well developed and well nourished.   Eyes: Conjunctiva and lids: No erythema, swelling, or discharge. Sclera non-icteric.   HENT: Atraumatic, normocephalic. External eyes, ears, and nose normal.   Respiratory: No signs of respiratory distress. Respiration rhythm and depth normal.   Clear to auscultation. No rales, crackles, rhonchi, or wheezing auscultated.   Cardiovascular:  Heart Rate and Rhythm: Irregularly, irregular. Heart Sounds:  S1 and S2.   Murmurs: No murmurs noted. No rubs, thrills, or gallops.   Lower Extremities: No edema noted.  Gastrointestinal:  Abdomen soft, non-distended, non-tender.   Musculoskeletal: Normal movement of extremities  Skin: Warm and dry.   Psychiatric: Patient alert and oriented to person, place, and time. Speech and behavior appropriate. Normal mood and affect.       ECG 12 Lead    Date/Time: 3/20/2023 11:00 AM  Performed by: Astrid Blackwell APRN  Authorized by: Astrid Blackwell APRN   Comparison: compared with previous ECG   Comparison to previous ECG: AF replaces SR  Rhythm: atrial fibrillation  BPM: 142                Assessment:       Diagnosis Plan   1. Atrial fibrillation with RVR (HCC)        2. " H/O cardiac radiofrequency ablation--PVI 1/2023        3. Benign essential HTN               Plan:       1.-2. Persistent afib, s/p PVI 1/10/2023, recurrence, s/p CV 2/20---in afib today 's--she feels okay, not sure when it recurred post CV but HR was low 100's last week at PCP office. Her HR on recheck was 105 with pulse ox---will leave her on diltiazem 120 mg BID (it says daily but she never decreased to daily). I have ask hert check HR a couple of times per week until ablation and call if persistently elevated---consider increasing dilt or adding digoxin. Apixaban for AC---has not missed any doses. Will try to get her scheduled ASAP.     3. HTN, controlled.     As always, it has been a pleasure to participate in your patient's care.      Sincerely,         TAYLOR Tafoya

## 2023-03-27 ENCOUNTER — LAB (OUTPATIENT)
Dept: LAB | Facility: HOSPITAL | Age: 83
End: 2023-03-27
Payer: MEDICARE

## 2023-03-27 DIAGNOSIS — Z13.6 SCREENING FOR ISCHEMIC HEART DISEASE: ICD-10-CM

## 2023-03-27 DIAGNOSIS — Z01.810 PRE-OPERATIVE CARDIOVASCULAR EXAMINATION: ICD-10-CM

## 2023-03-27 LAB
ANION GAP SERPL CALCULATED.3IONS-SCNC: 11.3 MMOL/L (ref 5–15)
BASOPHILS # BLD AUTO: 0.04 10*3/MM3 (ref 0–0.2)
BASOPHILS NFR BLD AUTO: 0.7 % (ref 0–1.5)
BUN SERPL-MCNC: 13 MG/DL (ref 8–23)
BUN/CREAT SERPL: 13.5 (ref 7–25)
CALCIUM SPEC-SCNC: 9.7 MG/DL (ref 8.6–10.5)
CHLORIDE SERPL-SCNC: 100 MMOL/L (ref 98–107)
CO2 SERPL-SCNC: 25.7 MMOL/L (ref 22–29)
CREAT SERPL-MCNC: 0.96 MG/DL (ref 0.57–1)
DEPRECATED RDW RBC AUTO: 44.4 FL (ref 37–54)
EGFRCR SERPLBLD CKD-EPI 2021: 59.2 ML/MIN/1.73
EOSINOPHIL # BLD AUTO: 0.1 10*3/MM3 (ref 0–0.4)
EOSINOPHIL NFR BLD AUTO: 1.7 % (ref 0.3–6.2)
ERYTHROCYTE [DISTWIDTH] IN BLOOD BY AUTOMATED COUNT: 13.8 % (ref 12.3–15.4)
GLUCOSE SERPL-MCNC: 108 MG/DL (ref 65–99)
HCT VFR BLD AUTO: 44.5 % (ref 34–46.6)
HGB BLD-MCNC: 14.9 G/DL (ref 12–15.9)
IMM GRANULOCYTES # BLD AUTO: 0 10*3/MM3 (ref 0–0.05)
IMM GRANULOCYTES NFR BLD AUTO: 0 % (ref 0–0.5)
LYMPHOCYTES # BLD AUTO: 1.8 10*3/MM3 (ref 0.7–3.1)
LYMPHOCYTES NFR BLD AUTO: 31.1 % (ref 19.6–45.3)
MCH RBC QN AUTO: 29.2 PG (ref 26.6–33)
MCHC RBC AUTO-ENTMCNC: 33.5 G/DL (ref 31.5–35.7)
MCV RBC AUTO: 87.3 FL (ref 79–97)
MONOCYTES # BLD AUTO: 0.47 10*3/MM3 (ref 0.1–0.9)
MONOCYTES NFR BLD AUTO: 8.1 % (ref 5–12)
NEUTROPHILS NFR BLD AUTO: 3.37 10*3/MM3 (ref 1.7–7)
NEUTROPHILS NFR BLD AUTO: 58.4 % (ref 42.7–76)
PLATELET # BLD AUTO: 273 10*3/MM3 (ref 140–450)
PMV BLD AUTO: 9.3 FL (ref 6–12)
POTASSIUM SERPL-SCNC: 4.1 MMOL/L (ref 3.5–5.2)
RBC # BLD AUTO: 5.1 10*6/MM3 (ref 3.77–5.28)
SODIUM SERPL-SCNC: 137 MMOL/L (ref 136–145)
WBC NRBC COR # BLD: 5.78 10*3/MM3 (ref 3.4–10.8)

## 2023-03-27 PROCEDURE — 36415 COLL VENOUS BLD VENIPUNCTURE: CPT

## 2023-03-27 PROCEDURE — 85025 COMPLETE CBC W/AUTO DIFF WBC: CPT

## 2023-03-27 PROCEDURE — 80048 BASIC METABOLIC PNL TOTAL CA: CPT

## 2023-03-30 ENCOUNTER — ANESTHESIA (OUTPATIENT)
Dept: CARDIOLOGY | Facility: HOSPITAL | Age: 83
End: 2023-03-30
Payer: MEDICARE

## 2023-03-30 ENCOUNTER — ANESTHESIA EVENT (OUTPATIENT)
Dept: CARDIOLOGY | Facility: HOSPITAL | Age: 83
End: 2023-03-30
Payer: MEDICARE

## 2023-03-30 ENCOUNTER — HOSPITAL ENCOUNTER (OUTPATIENT)
Facility: HOSPITAL | Age: 83
Discharge: HOME | End: 2023-03-31
Attending: INTERNAL MEDICINE | Admitting: INTERNAL MEDICINE
Payer: MEDICARE

## 2023-03-30 DIAGNOSIS — I48.91 ATRIAL FIBRILLATION WITH RVR: ICD-10-CM

## 2023-03-30 DIAGNOSIS — Z98.890 H/O CARDIAC RADIOFREQUENCY ABLATION: ICD-10-CM

## 2023-03-30 LAB
ACT BLD: 311 SECONDS (ref 82–152)
ACT BLD: 311 SECONDS (ref 82–152)
ACT BLD: 359 SECONDS (ref 82–152)
QT INTERVAL: 297 MS
QT INTERVAL: 385 MS

## 2023-03-30 PROCEDURE — 25010000002 PROPOFOL 10 MG/ML EMULSION: Performed by: REGISTERED NURSE

## 2023-03-30 PROCEDURE — G0378 HOSPITAL OBSERVATION PER HR: HCPCS

## 2023-03-30 PROCEDURE — 93005 ELECTROCARDIOGRAM TRACING: CPT | Performed by: INTERNAL MEDICINE

## 2023-03-30 PROCEDURE — C1893 INTRO/SHEATH, FIXED,NON-PEEL: HCPCS | Performed by: INTERNAL MEDICINE

## 2023-03-30 PROCEDURE — 25010000002 DEXAMETHASONE PER 1 MG: Performed by: REGISTERED NURSE

## 2023-03-30 PROCEDURE — 63710000001 APIXABAN 5 MG TABLET: Performed by: INTERNAL MEDICINE

## 2023-03-30 PROCEDURE — 93010 ELECTROCARDIOGRAM REPORT: CPT | Performed by: INTERNAL MEDICINE

## 2023-03-30 PROCEDURE — 25010000002 PHENYLEPHRINE 10 MG/ML SOLUTION: Performed by: REGISTERED NURSE

## 2023-03-30 PROCEDURE — A9270 NON-COVERED ITEM OR SERVICE: HCPCS | Performed by: INTERNAL MEDICINE

## 2023-03-30 PROCEDURE — C1769 GUIDE WIRE: HCPCS | Performed by: INTERNAL MEDICINE

## 2023-03-30 PROCEDURE — C1894 INTRO/SHEATH, NON-LASER: HCPCS | Performed by: INTERNAL MEDICINE

## 2023-03-30 PROCEDURE — 25010000002 PROTAMINE SULFATE PER 10 MG: Performed by: INTERNAL MEDICINE

## 2023-03-30 PROCEDURE — 25010000002 FENTANYL CITRATE (PF) 50 MCG/ML SOLUTION: Performed by: REGISTERED NURSE

## 2023-03-30 PROCEDURE — 93662 INTRACARDIAC ECG (ICE): CPT | Performed by: INTERNAL MEDICINE

## 2023-03-30 PROCEDURE — C1732 CATH, EP, DIAG/ABL, 3D/VECT: HCPCS | Performed by: INTERNAL MEDICINE

## 2023-03-30 PROCEDURE — 25010000002 ONDANSETRON PER 1 MG: Performed by: REGISTERED NURSE

## 2023-03-30 PROCEDURE — 93462 L HRT CATH TRNSPTL PUNCTURE: CPT | Performed by: INTERNAL MEDICINE

## 2023-03-30 PROCEDURE — 85347 COAGULATION TIME ACTIVATED: CPT

## 2023-03-30 PROCEDURE — 25010000002 PHENYLEPHRINE 10 MG/ML SOLUTION 5 ML VIAL: Performed by: REGISTERED NURSE

## 2023-03-30 PROCEDURE — 25010000002 MIDAZOLAM PER 1 MG: Performed by: ANESTHESIOLOGY

## 2023-03-30 PROCEDURE — 25010000002 HEPARIN (PORCINE) PER 1000 UNITS: Performed by: INTERNAL MEDICINE

## 2023-03-30 PROCEDURE — 93653 COMPRE EP EVAL TX SVT: CPT | Performed by: INTERNAL MEDICINE

## 2023-03-30 PROCEDURE — C1759 CATH, INTRA ECHOCARDIOGRAPHY: HCPCS | Performed by: INTERNAL MEDICINE

## 2023-03-30 PROCEDURE — C1730 CATH, EP, 19 OR FEW ELECT: HCPCS | Performed by: INTERNAL MEDICINE

## 2023-03-30 PROCEDURE — 63710000001 DILTIAZEM CD 120 MG CAPSULE SUSTAINED-RELEASE 24 HR: Performed by: INTERNAL MEDICINE

## 2023-03-30 RX ORDER — IPRATROPIUM BROMIDE AND ALBUTEROL SULFATE 2.5; .5 MG/3ML; MG/3ML
3 SOLUTION RESPIRATORY (INHALATION) ONCE AS NEEDED
Status: DISCONTINUED | OUTPATIENT
Start: 2023-03-30 | End: 2023-03-30 | Stop reason: HOSPADM

## 2023-03-30 RX ORDER — FLUMAZENIL 0.1 MG/ML
0.2 INJECTION INTRAVENOUS AS NEEDED
Status: DISCONTINUED | OUTPATIENT
Start: 2023-03-30 | End: 2023-03-30 | Stop reason: HOSPADM

## 2023-03-30 RX ORDER — MIDAZOLAM HYDROCHLORIDE 1 MG/ML
0.5 INJECTION INTRAMUSCULAR; INTRAVENOUS
Status: DISCONTINUED | OUTPATIENT
Start: 2023-03-30 | End: 2023-03-30

## 2023-03-30 RX ORDER — HYDROCODONE BITARTRATE AND ACETAMINOPHEN 5; 325 MG/1; MG/1
1 TABLET ORAL EVERY 4 HOURS PRN
Status: DISCONTINUED | OUTPATIENT
Start: 2023-03-30 | End: 2023-03-31 | Stop reason: HOSPADM

## 2023-03-30 RX ORDER — SODIUM CHLORIDE 9 MG/ML
40 INJECTION, SOLUTION INTRAVENOUS AS NEEDED
Status: DISCONTINUED | OUTPATIENT
Start: 2023-03-30 | End: 2023-03-30 | Stop reason: HOSPADM

## 2023-03-30 RX ORDER — DIPHENHYDRAMINE HYDROCHLORIDE 50 MG/ML
12.5 INJECTION INTRAMUSCULAR; INTRAVENOUS
Status: DISCONTINUED | OUTPATIENT
Start: 2023-03-30 | End: 2023-03-30 | Stop reason: HOSPADM

## 2023-03-30 RX ORDER — PROPOFOL 10 MG/ML
VIAL (ML) INTRAVENOUS AS NEEDED
Status: DISCONTINUED | OUTPATIENT
Start: 2023-03-30 | End: 2023-03-30 | Stop reason: SURG

## 2023-03-30 RX ORDER — SODIUM CHLORIDE 0.9 % (FLUSH) 0.9 %
3 SYRINGE (ML) INJECTION EVERY 12 HOURS SCHEDULED
Status: DISCONTINUED | OUTPATIENT
Start: 2023-03-30 | End: 2023-03-30

## 2023-03-30 RX ORDER — PANTOPRAZOLE SODIUM 40 MG/1
40 TABLET, DELAYED RELEASE ORAL
Status: DISCONTINUED | OUTPATIENT
Start: 2023-03-31 | End: 2023-03-31 | Stop reason: HOSPADM

## 2023-03-30 RX ORDER — HYDROMORPHONE HYDROCHLORIDE 1 MG/ML
0.25 INJECTION, SOLUTION INTRAMUSCULAR; INTRAVENOUS; SUBCUTANEOUS
Status: DISCONTINUED | OUTPATIENT
Start: 2023-03-30 | End: 2023-03-30 | Stop reason: HOSPADM

## 2023-03-30 RX ORDER — DROPERIDOL 2.5 MG/ML
0.62 INJECTION, SOLUTION INTRAMUSCULAR; INTRAVENOUS
Status: DISCONTINUED | OUTPATIENT
Start: 2023-03-30 | End: 2023-03-30 | Stop reason: HOSPADM

## 2023-03-30 RX ORDER — SODIUM CHLORIDE 9 MG/ML
75 INJECTION, SOLUTION INTRAVENOUS CONTINUOUS
Status: DISCONTINUED | OUTPATIENT
Start: 2023-03-30 | End: 2023-03-30

## 2023-03-30 RX ORDER — HEPARIN SODIUM 1000 [USP'U]/ML
INJECTION, SOLUTION INTRAVENOUS; SUBCUTANEOUS
Status: DISCONTINUED | OUTPATIENT
Start: 2023-03-30 | End: 2023-03-30 | Stop reason: HOSPADM

## 2023-03-30 RX ORDER — SODIUM CHLORIDE 0.9 % (FLUSH) 0.9 %
10 SYRINGE (ML) INJECTION EVERY 12 HOURS SCHEDULED
Status: DISCONTINUED | OUTPATIENT
Start: 2023-03-30 | End: 2023-03-30 | Stop reason: HOSPADM

## 2023-03-30 RX ORDER — SODIUM CHLORIDE 0.9 % (FLUSH) 0.9 %
10 SYRINGE (ML) INJECTION AS NEEDED
Status: DISCONTINUED | OUTPATIENT
Start: 2023-03-30 | End: 2023-03-30 | Stop reason: HOSPADM

## 2023-03-30 RX ORDER — ACETAMINOPHEN 650 MG/1
650 SUPPOSITORY RECTAL EVERY 4 HOURS PRN
Status: DISCONTINUED | OUTPATIENT
Start: 2023-03-30 | End: 2023-03-31 | Stop reason: HOSPADM

## 2023-03-30 RX ORDER — LIDOCAINE HYDROCHLORIDE AND EPINEPHRINE 10; 10 MG/ML; UG/ML
INJECTION, SOLUTION INFILTRATION; PERINEURAL
Status: DISCONTINUED | OUTPATIENT
Start: 2023-03-30 | End: 2023-03-30 | Stop reason: HOSPADM

## 2023-03-30 RX ORDER — SODIUM CHLORIDE 0.9 % (FLUSH) 0.9 %
3-10 SYRINGE (ML) INJECTION AS NEEDED
Status: DISCONTINUED | OUTPATIENT
Start: 2023-03-30 | End: 2023-03-30

## 2023-03-30 RX ORDER — PROMETHAZINE HYDROCHLORIDE 25 MG/1
25 TABLET ORAL ONCE AS NEEDED
Status: DISCONTINUED | OUTPATIENT
Start: 2023-03-30 | End: 2023-03-30 | Stop reason: HOSPADM

## 2023-03-30 RX ORDER — ONDANSETRON 2 MG/ML
4 INJECTION INTRAMUSCULAR; INTRAVENOUS ONCE AS NEEDED
Status: DISCONTINUED | OUTPATIENT
Start: 2023-03-30 | End: 2023-03-30 | Stop reason: HOSPADM

## 2023-03-30 RX ORDER — HYDRALAZINE HYDROCHLORIDE 20 MG/ML
5 INJECTION INTRAMUSCULAR; INTRAVENOUS
Status: DISCONTINUED | OUTPATIENT
Start: 2023-03-30 | End: 2023-03-30 | Stop reason: HOSPADM

## 2023-03-30 RX ORDER — LABETALOL HYDROCHLORIDE 5 MG/ML
5 INJECTION, SOLUTION INTRAVENOUS
Status: DISCONTINUED | OUTPATIENT
Start: 2023-03-30 | End: 2023-03-30 | Stop reason: HOSPADM

## 2023-03-30 RX ORDER — FENTANYL CITRATE 50 UG/ML
50 INJECTION, SOLUTION INTRAMUSCULAR; INTRAVENOUS
Status: DISCONTINUED | OUTPATIENT
Start: 2023-03-30 | End: 2023-03-30

## 2023-03-30 RX ORDER — HYDROCODONE BITARTRATE AND ACETAMINOPHEN 7.5; 325 MG/1; MG/1
1 TABLET ORAL EVERY 4 HOURS PRN
Status: DISCONTINUED | OUTPATIENT
Start: 2023-03-30 | End: 2023-03-30 | Stop reason: HOSPADM

## 2023-03-30 RX ORDER — EPHEDRINE SULFATE 50 MG/ML
5 INJECTION, SOLUTION INTRAVENOUS ONCE AS NEEDED
Status: DISCONTINUED | OUTPATIENT
Start: 2023-03-30 | End: 2023-03-30 | Stop reason: HOSPADM

## 2023-03-30 RX ORDER — DILTIAZEM HYDROCHLORIDE 120 MG/1
120 CAPSULE, COATED, EXTENDED RELEASE ORAL
Status: DISCONTINUED | OUTPATIENT
Start: 2023-03-30 | End: 2023-03-31 | Stop reason: HOSPADM

## 2023-03-30 RX ORDER — SODIUM CHLORIDE 9 MG/ML
INJECTION, SOLUTION INTRAVENOUS
Status: COMPLETED | OUTPATIENT
Start: 2023-03-30 | End: 2023-03-30

## 2023-03-30 RX ORDER — FAMOTIDINE 10 MG/ML
20 INJECTION, SOLUTION INTRAVENOUS ONCE
Status: COMPLETED | OUTPATIENT
Start: 2023-03-30 | End: 2023-03-30

## 2023-03-30 RX ORDER — HYDROCODONE BITARTRATE AND ACETAMINOPHEN 5; 325 MG/1; MG/1
1 TABLET ORAL ONCE AS NEEDED
Status: DISCONTINUED | OUTPATIENT
Start: 2023-03-30 | End: 2023-03-30 | Stop reason: HOSPADM

## 2023-03-30 RX ORDER — PROMETHAZINE HYDROCHLORIDE 25 MG/1
25 SUPPOSITORY RECTAL ONCE AS NEEDED
Status: DISCONTINUED | OUTPATIENT
Start: 2023-03-30 | End: 2023-03-30 | Stop reason: HOSPADM

## 2023-03-30 RX ORDER — LIDOCAINE HYDROCHLORIDE 10 MG/ML
0.5 INJECTION, SOLUTION EPIDURAL; INFILTRATION; INTRACAUDAL; PERINEURAL ONCE AS NEEDED
Status: DISCONTINUED | OUTPATIENT
Start: 2023-03-30 | End: 2023-03-30

## 2023-03-30 RX ORDER — PHENYLEPHRINE HYDROCHLORIDE 10 MG/ML
INJECTION INTRAVENOUS AS NEEDED
Status: DISCONTINUED | OUTPATIENT
Start: 2023-03-30 | End: 2023-03-30 | Stop reason: SURG

## 2023-03-30 RX ORDER — NALOXONE HCL 0.4 MG/ML
0.2 VIAL (ML) INJECTION AS NEEDED
Status: DISCONTINUED | OUTPATIENT
Start: 2023-03-30 | End: 2023-03-30 | Stop reason: HOSPADM

## 2023-03-30 RX ORDER — SODIUM CHLORIDE, SODIUM LACTATE, POTASSIUM CHLORIDE, CALCIUM CHLORIDE 600; 310; 30; 20 MG/100ML; MG/100ML; MG/100ML; MG/100ML
9 INJECTION, SOLUTION INTRAVENOUS CONTINUOUS
Status: DISCONTINUED | OUTPATIENT
Start: 2023-03-30 | End: 2023-03-31

## 2023-03-30 RX ORDER — FENTANYL CITRATE 50 UG/ML
INJECTION, SOLUTION INTRAMUSCULAR; INTRAVENOUS AS NEEDED
Status: DISCONTINUED | OUTPATIENT
Start: 2023-03-30 | End: 2023-03-30 | Stop reason: SURG

## 2023-03-30 RX ORDER — ROCURONIUM BROMIDE 10 MG/ML
INJECTION, SOLUTION INTRAVENOUS AS NEEDED
Status: DISCONTINUED | OUTPATIENT
Start: 2023-03-30 | End: 2023-03-30 | Stop reason: SURG

## 2023-03-30 RX ORDER — ONDANSETRON 2 MG/ML
INJECTION INTRAMUSCULAR; INTRAVENOUS AS NEEDED
Status: DISCONTINUED | OUTPATIENT
Start: 2023-03-30 | End: 2023-03-30 | Stop reason: SURG

## 2023-03-30 RX ORDER — DEXAMETHASONE SODIUM PHOSPHATE 4 MG/ML
INJECTION, SOLUTION INTRA-ARTICULAR; INTRALESIONAL; INTRAMUSCULAR; INTRAVENOUS; SOFT TISSUE AS NEEDED
Status: DISCONTINUED | OUTPATIENT
Start: 2023-03-30 | End: 2023-03-30 | Stop reason: SURG

## 2023-03-30 RX ORDER — PROTAMINE SULFATE 10 MG/ML
INJECTION, SOLUTION INTRAVENOUS
Status: DISCONTINUED | OUTPATIENT
Start: 2023-03-30 | End: 2023-03-30 | Stop reason: HOSPADM

## 2023-03-30 RX ORDER — LIDOCAINE HYDROCHLORIDE 20 MG/ML
INJECTION, SOLUTION INFILTRATION; PERINEURAL AS NEEDED
Status: DISCONTINUED | OUTPATIENT
Start: 2023-03-30 | End: 2023-03-30 | Stop reason: SURG

## 2023-03-30 RX ORDER — FENTANYL CITRATE 50 UG/ML
25 INJECTION, SOLUTION INTRAMUSCULAR; INTRAVENOUS
Status: DISCONTINUED | OUTPATIENT
Start: 2023-03-30 | End: 2023-03-30 | Stop reason: HOSPADM

## 2023-03-30 RX ORDER — ACETAMINOPHEN 325 MG/1
650 TABLET ORAL EVERY 4 HOURS PRN
Status: DISCONTINUED | OUTPATIENT
Start: 2023-03-30 | End: 2023-03-31 | Stop reason: HOSPADM

## 2023-03-30 RX ADMIN — ROCURONIUM BROMIDE 50 MG: 50 INJECTION INTRAVENOUS at 12:43

## 2023-03-30 RX ADMIN — ROCURONIUM BROMIDE 10 MG: 50 INJECTION INTRAVENOUS at 13:15

## 2023-03-30 RX ADMIN — ONDANSETRON 4 MG: 2 INJECTION INTRAMUSCULAR; INTRAVENOUS at 12:50

## 2023-03-30 RX ADMIN — PHENYLEPHRINE HYDROCHLORIDE 200 MCG: 10 INJECTION, SOLUTION INTRAVENOUS at 13:03

## 2023-03-30 RX ADMIN — ROCURONIUM BROMIDE 10 MG: 50 INJECTION INTRAVENOUS at 14:17

## 2023-03-30 RX ADMIN — DEXAMETHASONE SODIUM PHOSPHATE 10 MG: 4 INJECTION, SOLUTION INTRA-ARTICULAR; INTRALESIONAL; INTRAMUSCULAR; INTRAVENOUS; SOFT TISSUE at 12:50

## 2023-03-30 RX ADMIN — PHENYLEPHRINE HYDROCHLORIDE 0.5 MCG/KG/MIN: 10 INJECTION INTRAVENOUS at 12:48

## 2023-03-30 RX ADMIN — MIDAZOLAM 0.5 MG: 1 INJECTION INTRAMUSCULAR; INTRAVENOUS at 12:23

## 2023-03-30 RX ADMIN — PROPOFOL 150 MG: 10 INJECTION, EMULSION INTRAVENOUS at 12:41

## 2023-03-30 RX ADMIN — DILTIAZEM HYDROCHLORIDE 120 MG: 120 CAPSULE, COATED, EXTENDED RELEASE ORAL at 19:47

## 2023-03-30 RX ADMIN — LIDOCAINE HYDROCHLORIDE 60 MG: 20 INJECTION, SOLUTION INFILTRATION; PERINEURAL at 12:41

## 2023-03-30 RX ADMIN — ROCURONIUM BROMIDE 10 MG: 50 INJECTION INTRAVENOUS at 14:44

## 2023-03-30 RX ADMIN — FENTANYL CITRATE 50 MCG: 50 INJECTION, SOLUTION INTRAMUSCULAR; INTRAVENOUS at 12:41

## 2023-03-30 RX ADMIN — SODIUM CHLORIDE 75 ML/HR: 9 INJECTION, SOLUTION INTRAVENOUS at 09:46

## 2023-03-30 RX ADMIN — SUGAMMADEX 200 MG: 100 INJECTION, SOLUTION INTRAVENOUS at 14:52

## 2023-03-30 RX ADMIN — FAMOTIDINE 20 MG: 10 INJECTION INTRAVENOUS at 12:21

## 2023-03-30 RX ADMIN — ROCURONIUM BROMIDE 10 MG: 50 INJECTION INTRAVENOUS at 13:45

## 2023-03-30 RX ADMIN — APIXABAN 5 MG: 5 TABLET, FILM COATED ORAL at 19:47

## 2023-03-30 NOTE — ANESTHESIA PROCEDURE NOTES
Airway  Urgency: elective    Date/Time: 3/30/2023 12:45 PM  Airway not difficult    General Information and Staff    Patient location during procedure: OR  Anesthesiologist: Althea Jones MD  CRNA/CAA: César Acosta CRNA    Indications and Patient Condition  Indications for airway management: airway protection    Preoxygenated: yes  MILS not maintained throughout  Mask difficulty assessment: 1 - vent by mask    Final Airway Details  Final airway type: endotracheal airway      Successful airway: ETT  Cuffed: yes   Successful intubation technique: direct laryngoscopy  Facilitating devices/methods: anterior pressure/BURP  Endotracheal tube insertion site: oral  Blade: Jeremy  Blade size: 3  ETT size (mm): 7.0  Cormack-Lehane Classification: grade III - view of epiglottis only  Placement verified by: chest auscultation and capnometry   Cuff volume (mL): 10  Measured from: teeth  ETT/EBT  to teeth (cm): 22  Number of attempts at approach: 1  Assessment: lips, teeth, and gum same as pre-op and atraumatic intubation

## 2023-03-30 NOTE — Clinical Note
Hemostasis started on the right femoral vein. Figure 8 suturing was used in achieving hemostasis. Closure device deployed in the vessel. Hemostasis achieved successfully. Closure device additional comment: figure 8 stitch and stopcock

## 2023-03-30 NOTE — Clinical Note
First Trans-septal procedure in progress. Attempted with BRK-1 71 cm - unsuccessful.  Success with BRK 71 cm

## 2023-03-30 NOTE — Clinical Note
The DP pulses are +2 bilaterally. The PT pulses are +1 bilaterally. Heels clear. No signs of skin breakdown on heels. Heels elevated on cushion and hand check clear. Cocoon under patient and turned on 43° celsius. Clear sight finger cuff in use.

## 2023-03-30 NOTE — Clinical Note
Hemostasis started on the left femoral vein. Figure 8 suturing was used in achieving hemostasis. Closure device deployed in the vessel. Hemostasis achieved successfully. Closure device additional comment: figure 8 stitch and stopcock

## 2023-03-30 NOTE — ANESTHESIA PREPROCEDURE EVALUATION
" Anesthesia Evaluation     Patient summary reviewed and Nursing notes reviewed   history of anesthetic complications:  NPO Solid Status: > 8 hours  NPO Liquid Status: > 2 hours           Airway   Mallampati: II  Dental - normal exam     Pulmonary - negative pulmonary ROS   Cardiovascular   Exercise tolerance: good (4-7 METS)    ECG reviewed  PT is on anticoagulation therapy  Rhythm: irregular  Rate: abnormal    (+) hypertension, dysrhythmias Atrial Fib, hyperlipidemia,     ROS comment: Date/Time: 3/20/2023 11:00 AM  Performed by: Astrid Blackwell APRN  Authorized by: Astrid Blackwell APRN   Comparison: compared with previous ECG   Comparison to previous ECG: AF replaces SR  Rhythm: atrial fibrillation  BPM: 142    Neuro/Psych- negative ROS  GI/Hepatic/Renal/Endo    (+)  GERD,      Musculoskeletal (-) negative ROS    Abdominal    Substance History - negative use     OB/GYN negative ob/gyn ROS         Other                        Anesthesia Plan    ASA 3     general     (BP (!) 151/108 (BP Location: Right arm, Patient Position: Sitting)   Pulse (!) 141   Temp 36.3 °C (97.4 °F) (Temporal)   Resp 16   Ht 160 cm (63\")   Wt 59 kg (130 lb)   SpO2 97%   BMI 23.03 kg/m²     I have reviewed the patient's history with the patient and the chart, including all pertinent laboratory results and imaging. I have explained the risks of anesthesia including but not limited to dental damage, corneal abrasion, nerve injury, MI, stroke, and death.    )  intravenous induction     Anesthetic plan, risks, benefits, and alternatives have been provided, discussed and informed consent has been obtained with: patient.        CODE STATUS:       "

## 2023-03-30 NOTE — PLAN OF CARE
Goal Outcome Evaluation:   Pt is s/p ablation received from the CL yaniv groin with sutures and stopcock, HOB 30 degrees however lt groin was oozing so stitches tightened.HOB flat at 1730. Oozing stopped HOB to 30 degress at 1845 vitals stable on SR will cont to monitor

## 2023-03-31 VITALS
WEIGHT: 130 LBS | HEIGHT: 63 IN | BODY MASS INDEX: 23.04 KG/M2 | RESPIRATION RATE: 16 BRPM | DIASTOLIC BLOOD PRESSURE: 67 MMHG | HEART RATE: 86 BPM | SYSTOLIC BLOOD PRESSURE: 123 MMHG | TEMPERATURE: 98.8 F | OXYGEN SATURATION: 93 %

## 2023-03-31 LAB
ANION GAP SERPL CALCULATED.3IONS-SCNC: 10.6 MMOL/L (ref 5–15)
BUN SERPL-MCNC: 17 MG/DL (ref 8–23)
BUN/CREAT SERPL: 19.1 (ref 7–25)
CALCIUM SPEC-SCNC: 9.4 MG/DL (ref 8.6–10.5)
CHLORIDE SERPL-SCNC: 101 MMOL/L (ref 98–107)
CO2 SERPL-SCNC: 23.4 MMOL/L (ref 22–29)
CREAT SERPL-MCNC: 0.89 MG/DL (ref 0.57–1)
EGFRCR SERPLBLD CKD-EPI 2021: 64.8 ML/MIN/1.73
GLUCOSE SERPL-MCNC: 144 MG/DL (ref 65–99)
POTASSIUM SERPL-SCNC: 4.1 MMOL/L (ref 3.5–5.2)
QT INTERVAL: 398 MS
SODIUM SERPL-SCNC: 135 MMOL/L (ref 136–145)

## 2023-03-31 PROCEDURE — A9270 NON-COVERED ITEM OR SERVICE: HCPCS | Performed by: INTERNAL MEDICINE

## 2023-03-31 PROCEDURE — 80048 BASIC METABOLIC PNL TOTAL CA: CPT | Performed by: INTERNAL MEDICINE

## 2023-03-31 PROCEDURE — 63710000001 APIXABAN 5 MG TABLET: Performed by: INTERNAL MEDICINE

## 2023-03-31 PROCEDURE — 63710000001 DILTIAZEM CD 120 MG CAPSULE SUSTAINED-RELEASE 24 HR: Performed by: INTERNAL MEDICINE

## 2023-03-31 PROCEDURE — 93005 ELECTROCARDIOGRAM TRACING: CPT | Performed by: INTERNAL MEDICINE

## 2023-03-31 PROCEDURE — 63710000001 PANTOPRAZOLE 40 MG TABLET DELAYED-RELEASE: Performed by: INTERNAL MEDICINE

## 2023-03-31 PROCEDURE — 93010 ELECTROCARDIOGRAM REPORT: CPT | Performed by: INTERNAL MEDICINE

## 2023-03-31 PROCEDURE — 99238 HOSP IP/OBS DSCHRG MGMT 30/<: CPT | Performed by: NURSE PRACTITIONER

## 2023-03-31 PROCEDURE — G0378 HOSPITAL OBSERVATION PER HR: HCPCS

## 2023-03-31 RX ORDER — DILTIAZEM HYDROCHLORIDE 120 MG/1
120 CAPSULE, COATED, EXTENDED RELEASE ORAL DAILY
Qty: 60 CAPSULE | Refills: 11
Start: 2023-03-31

## 2023-03-31 RX ADMIN — APIXABAN 5 MG: 5 TABLET, FILM COATED ORAL at 08:41

## 2023-03-31 RX ADMIN — DILTIAZEM HYDROCHLORIDE 120 MG: 120 CAPSULE, COATED, EXTENDED RELEASE ORAL at 08:41

## 2023-03-31 RX ADMIN — PANTOPRAZOLE SODIUM 40 MG: 40 TABLET, DELAYED RELEASE ORAL at 08:41

## 2023-03-31 NOTE — DISCHARGE SUMMARY
DISCHARGE NOTE    Patient Name: Berta Garay  Age/Sex: 82 y.o. female  : 1940  MRN: [id number]    Date of Discharge:  3/31/2023   Date of Admit: 3/30/2023  Encounter Provider: TAYLOR Sharma  Place of Service: [facility] CARDIOLOGY  Patient Care Team:  Josesito Lopez MD as PCP - General (Family Medicine)    Subjective:     Discharge Diagnosis:    Atrial fibrillation with RVR ([facility])    H/O cardiac radiofrequency ablation--PVI 2023      Hospital Course:     82 y.o. female who follows with Dr. Joseph---new onset persistent AF in , TY/CV 2022---stayed in SR for 5 months, recurred in early Nov, symptomatic---intolerant to beta blockers, able to tolerate CCB.      Saw Dr. Zamudio, s/p TY/PVI 2023, EF 66-70%, mild AI, mod MR.      I saw her  for follow up and she felt good but was in AF w/RVR, --she has never really felt her irregular heart beat, increased dilt and did a 48Hr monitor which showed persistent AF---CV 2023.      She had successful CV on .    Seen 3/20---was back in AF w/RVR and felt like she had been back in it for at least a couple of weeks.     Recommended redo ablation.     Redo ablation yesterday---PV's were isolated, ablation outside of pul veins along the left atrial roof to create roofline block, ablation in posterior wall of the LA using box lesion set to isolate the LA posterior wall, successful redo ablation in the LA ridge between the appendage and pul vein that was isolated.    She has maintained SR and is stable for dc home.     Follow up a scheduled  with Dr. Zamudio.    Decrease diltiazem to once daily.     Vital Signs  Temp:  [97.1 °F (36.2 °C)-98.8 °F (37.1 °C)] 98.8 °F (37.1 °C)  Heart Rate:  [81-90] 86  Resp:  [15-16] 16  BP: (108-130)/(67-92 123/67    Intake/Output Summary (Last 24 hours) at  3/31/2023 1255  Last data filed at 3/31/2023 0811  Gross per 24 hour   Intake 1600 ml   Output --   Net 1600 ml       Physical Exam:    General Appearance: No acute distress, well developed and well nourished.   Eyes: Conjunctiva and lids: No erythema, swelling, or discharge. Sclera non-icteric.   HENT: Atraumatic, normocephalic. External eyes, ears, and nose normal.   Respiratory: No signs of respiratory distress. Respiration rhythm and depth normal.   Clear to auscultation. No rales, crackles, rhonchi, or wheezing auscultated.   Cardiovascular:  Heart Rate and Rhythm: Normal, Heart Sounds: Normal S1 and S2. No S3 or S4 noted.  Murmurs: No murmurs noted. No rubs, thrills, or gallops.   Arterial Pulses: Posterior tibialis and dorsalis pedis pulses normal.   Lower Extremities: No edema noted.  Gastrointestinal:  Abdomen soft, non-distended, non-tender.  Musculoskeletal: Normal movement of extremities  Skin: Warm and dry.   Psychiatric: Patient alert and oriented to person, place, and time. Speech and behavior appropriate. Normal mood and affect.    Labs:   Results from last 7 days   Lab Units 03/31/23  0335 03/27/23  1015   SODIUM mmol/L 135* 137   POTASSIUM mmol/L 4.1 4.1   CHLORIDE mmol/L 101 100   CO2 mmol/L 23.4 25.7   BUN mg/dL 17 13   CREATININE mg/dL 0.89 0.96   GLUCOSE mg/dL 144* 108*   CALCIUM mg/dL 9.4 9.7         Results from last 7 days   Lab Units 03/27/23  1015   WBC 10*3/mm3 5.78   HEMOGLOBIN g/dL 14.9   HEMATOCRIT % 44.5   PLATELETS 10*3/mm3 273       Discharge Diet:    Dietary Orders (From admission, onward)     Start     Ordered    03/30/23 1515  Diet: Regular/House Diet; Texture: Regular Texture (IDDSI 7); Fluid Consistency: Thin (IDDSI 0)  Diet Effective Now        References:    Diet Order [place]   Question Answer Comment   Diets: Regular/House Diet    Texture: Regular Texture (IDDSI 7)    Fluid Consistency: Thin (IDDSI 0)        03/30/23 1515                Activity at Discharge:   Activity  Instructions    Follow activity instruction as stated within this discharge packet        Instructions given to patient    Discharge Medications     Discharge Medications      Changes to Medications      Instructions Start Date   dilTIAZem  MG 24 hr capsule  Commonly known as: CARDIZEM CD  What changed: when to take this   120 mg, Oral, Daily         Continue These Medications      Instructions Start Date   atorvastatin 40 MG tablet  Commonly known as: LIPITOR   40 mg, Oral, Daily      citalopram 20 MG tablet  Commonly known as: CeleXA   20 mg, Oral, Daily      Eliquis 5 MG tablet tablet  Generic drug: apixaban   TAKE 1 TABLET BY MOUTH EVERY 12 HOURS      lansoprazole 30 MG capsule  Commonly known as: PREVACID   30 mg, Oral, Daily      lisinopril 40 MG tablet  Commonly known as: PRINIVIL,ZESTRIL   40 mg, Oral, Daily      multivitamin with minerals tablet tablet   1 tablet, Oral, Daily      tretinoin 0.025 % cream  Commonly known as: RETIN-A   Topical, See Admin Instructions      VITAMIN D (CHOLECALCIFEROL) PO   Oral             Discharge disposition: home    Follow-up Appointments   Follow-up Information     Josesito Lopez MD .    Specialty: Family Medicine  Contact information:  [address] [facility] [address] [phone]             Wilder Zamudio MD Follow up on 5/12/2023.    Specialties: Cardiology, Cardiac Electrophysiology  Contact information:  [address]  [facility] [id number] [phone]                       Future Appointments   Date Time Provider Department Center   5/12/2023  1:20 PM Wilder Zamudio MD [facility] Blackwell, TAYLOR  03/31/23  12:55 EDT

## 2023-04-02 NOTE — CASE MANAGEMENT/SOCIAL WORK
Case Management Discharge Note      Final Note: DC Home         Selected Continued Care - Discharged on 3/31/2023 Admission date: 3/30/2023 - Discharge disposition: Home or Self Care    Destination    No services have been selected for the patient.              Durable Medical Equipment    No services have been selected for the patient.              Dialysis/Infusion    No services have been selected for the patient.              Home Medical Care    No services have been selected for the patient.              Therapy    No services have been selected for the patient.              Community Resources    No services have been selected for the patient.              Community & DME    No services have been selected for the patient.                       Final Discharge Disposition Code: 01 - home or self-care

## 2023-04-02 NOTE — ANESTHESIA POSTPROCEDURE EVALUATION
"Patient: Berta Garay    Procedure Summary     Date: 03/30/23 Room / Location: [facility] CATH/EP LAB 5 /  [facility] CATH INVASIVE LOCATION    Anesthesia Start: 1230 Anesthesia Stop: 1524    Procedures:       Ablation atrial fibrillation--redo      3D Mapping EP Diagnosis:       Atrial fibrillation with RVR      H/O cardiac radiofrequency ablation      (Persistent afib)    Providers: Wilder Zamudio MD Provider: Althea Jones MD    Anesthesia Type: general ASA Status: 3          Anesthesia Type: general    Vitals  Vitals Value Taken Time   /77 03/30/23 1600   Temp     Pulse 83 03/30/23 1612   Resp 16 03/30/23 1600   SpO2 92 % 03/30/23 1612   Vitals shown include unvalidated device data.        Post Anesthesia Care and Evaluation    Patient location during evaluation: bedside  Patient participation: complete - patient participated  Level of consciousness: awake and alert  Pain management: adequate    Airway patency: patent  Anesthetic complications: No anesthetic complications    Cardiovascular status: acceptable  Respiratory status: acceptable  Hydration status: acceptable    Comments: /67 (BP Location: Right arm, Patient Position: Lying)   Pulse 86   Temp 37.1 °C (98.8 °F) (Oral)   Resp 16   Ht 160 cm (63\")   Wt 59 kg (130 lb)   SpO2 93%   BMI 23.03 kg/m²       "

## 2023-04-05 ENCOUNTER — NURSE TRIAGE (OUTPATIENT)
Dept: CALL CENTER | Facility: HOSPITAL | Age: 83
End: 2023-04-05
Payer: MEDICARE

## 2023-04-06 ENCOUNTER — TELEPHONE (OUTPATIENT)
Dept: CARDIOLOGY | Facility: CLINIC | Age: 83
End: 2023-04-06
Payer: MEDICARE

## 2023-04-06 NOTE — TELEPHONE ENCOUNTER
Reason for Disposition  • [1] Caller has NON-URGENT question AND [2] triager unable to answer question    Additional Information  • Negative: [1] Major abdominal surgical incision AND [2] wound gaping open AND [3] visible internal organs  • Negative: Sounds like a life-threatening emergency to the triager  • Negative: Patient has a Negative Pressure Wound Therapy device  • Negative: Patient is followed by a wound clinic or wound specialist for this wound  • Negative: [1] Bleeding from incision AND [2] won't stop after 10 minutes of direct pressure  • Negative: [1] Widespread rash AND [2] bright red, sunburn-like  • Negative: Severe pain in the incision  • Negative: [1] Incision gaping open AND [2] < 48 hours since wound re-opened  • Negative: [1] Incision gaping open AND [2] length of opening > 2 inches (5 cm)  • Negative: Patient sounds very sick or weak to the triager  • Negative: Sounds like a serious complication to the triager  • Negative: Fever > 100.4 F (38.0 C)  • Negative: [1] Incision looks infected (spreading redness, pain) AND [2] fever > 99.5 F (37.5 C)  • Negative: [1] Incision looks infected (spreading redness, pain) AND [2] large red area (> 2 in. or 5 cm)  • Negative: [1] Incision looks infected (spreading redness, pain) AND [2] face wound  • Negative: [1] Red streak runs from the incision AND [2] longer than 1 inch (2.5 cm)  • Negative: [1] Pus or bad-smelling fluid draining from incision AND [2] no fever  • Negative: [1] Post-op pain AND [2] not controlled with pain medications  • Negative: Dressing soaked with blood or body fluid (e.g., drainage)  • Negative: [1] Raised bruise and [2] size > 2 inches (5 cm) and expanding  • Negative: [1] Caller has URGENT question AND [2] triager unable to answer question  • Negative: [1] INCREASING pain in incision AND [2] > 2 days (48 hours) since surgery  • Negative: [1] Small red area or streak AND [2] no fever  • Negative: [1] Clear or blood-tinged fluid  "draining from wound AND [2] no fever  • Negative: [1] Incision gaping open AND [2] > 48 hours since wound re-opened AND [3] length of opening > 1/2 inch (12 mm)  • Negative: [1] Incision on face gaping open after skin glue AND [2] > 48 hours since wound re-opened AND [3] length of opening > 1/4 inch (6 mm)  • Negative: Suture or staple removal is overdue  • Negative: [1] Suture or staple came out early AND [2] caller wants wound checked    Answer Assessment - Initial Assessment Questions  1. SYMPTOM: \"What's the main symptom you're concerned about?\" (e.g., redness, pain, drainage)       Knot in groin after abaltion  2. ONSET: \"When did knot start?\"      thursday  3. SURGERY: \"What surgery was performed?\"    Thursday*/ abalation  4. DATE of SURGERY: \"When was surgery performed?\"      thursday  5. INCISION SITE: \"Where is the incision located?\"       Left groin  6. REDNESS: \"Is there any redness at the incision site?\" If yes, ask: \"How wide across is the redness?\" (Inches, centimeters)       no  7.noN: \"Is there any pain?\" If Yes, ask: \"How bad is it?\"  (Scale 1-10; or mild, moderate, severe)     Very mild  8. BLEEDING: \"Is there any bleeding?\" If Yes, ask: \"How much?\" and \"Where?\"      denies  9. DRAINAGE: \"Is there any drainage from the incision site?\" If yes, ask: \"What color and how much?\" (e.g., red, cloudy, pus; drops, teaspoon)      denies  10. FEVER: \"Do you have a fever?\" If Yes, ask: \"What is your temperature, how was it measured, and when did it start?\"       denies  11. OTHER SYMPTOMS: \"Do you have any other symptoms?\" (e.g., shaking chills, weakness, rash elsewhere on body)        Small knot, reported to office on the 3rd and they said to watch it, today alittle more swollen, waiting for msg on my chart    Protocols used: POST-OP INCISION SYMPTOMS AND QUESTIONS-ADULT-      "

## 2023-04-06 NOTE — TELEPHONE ENCOUNTER
----- Message from TAYLOR Bustos sent at 4/6/2023  8:04 AM EDT -----  Regarding: FW: Ablation (surgery site)  Contact: [phone]  Just see if she can come in sometime today and I can look at it.   ----- Message -----  From: Kamilah Cobb MA  Sent: 4/6/2023   7:28 AM EDT  To: Wilder Zamudio MD, TAYLOR Bustos  Subject: FW: Ablation (surgery site)                      Please see attached pt message. Does she need to come in?...Kamilah    ----- Message -----  From: Berta Garay  Sent: 4/5/2023   8:10 PM EDT  To: Serg Serrano [facility]  Subject: Ablation (surgery site)                          I am quite concerned about the knot in the groin area. It is bigger and red. What do I do!!  I am quite aware of it from the feeling standpoint

## 2023-04-07 ENCOUNTER — HOSPITAL ENCOUNTER (OUTPATIENT)
Dept: CARDIOLOGY | Facility: HOSPITAL | Age: 83
Discharge: HOME | End: 2023-04-07
Payer: MEDICARE

## 2023-04-07 DIAGNOSIS — T81.718A PSEUDOANEURYSM FOLLOWING PROCEDURE: Primary | ICD-10-CM

## 2023-04-07 DIAGNOSIS — M96.830 POSTPROCEDURAL HEMORRHAGE OF A MUSCULOSKELETAL STRUCTURE FOLLOWING A MUSCULOSKELETAL SYSTEM PROCEDURE: ICD-10-CM

## 2023-04-07 DIAGNOSIS — I72.9 PSEUDOANEURYSM FOLLOWING PROCEDURE: Primary | ICD-10-CM

## 2023-04-07 DIAGNOSIS — I72.9 PSEUDOANEURYSM FOLLOWING PROCEDURE: ICD-10-CM

## 2023-04-07 DIAGNOSIS — T81.718A PSEUDOANEURYSM FOLLOWING PROCEDURE: ICD-10-CM

## 2023-04-07 LAB
BH CV LEFT GROIN HEMATOMA TRANS WIDTH: 1.46 CM
BH CV LEFT GROIN PSA PROCEDURE SCRIPTING LRR: 1
BH CV LEFT HEMATOMA TRANS LENGTH: 1.47 CM
BH CV RIGHT GROIN PSA PROCEDURE SCRIPTING LRR: 1
BH CV VAS L EIA VELOCITY PSV: 69.5 CM/SEC
BH CV XLRA MEAS EXT ILIAC A PSV RIGHT: 92.9 CM/SEC
LEFT GROIN CFA SYS: 54.9 CM/SEC
MAXIMAL PREDICTED HEART RATE: 138 BPM
PROX PFA PSV LEFT: 37.8 CM/SEC
PROX PFA PSV RIGHT: 46.6 CM/SEC
PROX SFA PSV LEFT: 57.1 CM/SEC
PROX SFA PSV RIGHT: 77 CM/SEC
RIGHT GROIN CFA SYS: 64.6 CM/SEC
STRESS TARGET HR: 117 BPM

## 2023-04-07 PROCEDURE — 93925 LOWER EXTREMITY STUDY: CPT

## 2023-05-12 ENCOUNTER — OFFICE VISIT (OUTPATIENT)
Dept: CARDIOLOGY | Facility: CLINIC | Age: 83
End: 2023-05-12
Payer: MEDICARE

## 2023-05-12 VITALS
BODY MASS INDEX: 23.88 KG/M2 | HEART RATE: 140 BPM | SYSTOLIC BLOOD PRESSURE: 140 MMHG | HEIGHT: 63 IN | WEIGHT: 134.8 LBS | DIASTOLIC BLOOD PRESSURE: 92 MMHG

## 2023-05-12 DIAGNOSIS — Z98.890 H/O CARDIAC RADIOFREQUENCY ABLATION: ICD-10-CM

## 2023-05-12 DIAGNOSIS — I48.91 ATRIAL FIBRILLATION WITH RVR: Primary | ICD-10-CM

## 2023-05-12 DIAGNOSIS — I10 BENIGN ESSENTIAL HTN: ICD-10-CM

## 2023-05-12 RX ORDER — DILTIAZEM HYDROCHLORIDE 360 MG/1
360 CAPSULE, EXTENDED RELEASE ORAL DAILY
Qty: 90 CAPSULE | Refills: 1 | Status: SHIPPED | OUTPATIENT
Start: 2023-05-12

## 2023-05-12 NOTE — PROGRESS NOTES
"Date of Office Visit: 2023  Encounter Provider: Wilder Zamudio MD  Place of Service: [facility] CARDIOLOGY  Patient Name: Berta Garay  : 1940    Subjective:     Encounter Date:2023      Patient ID: Berta Garay is a 83 y.o. female who has a cc of  AF and I just did two ablations this year. One PVI in  then due to AF I took her back in march and she had isolated veins AND pers AF. I did PW isolation and roof line and she is back in AF     But.     She feels pretty good. Some fatigue but works in the yard, climbs stairs etc         No anginal chest pain,   No sig leal,   No soa,   No fainting,  No orthostasis.   No edema.   Exercise tolerance:     There have been no hospital admission since the last visit.     There have been no bleeding events.       Past Medical History:   Diagnosis Date   • Atrial fibrillation    • Colon polyp    • GERD (gastroesophageal reflux disease)    • Hyperlipidemia    • Hypertension        Social History     Socioeconomic History   • Marital status:    Tobacco Use   • Smoking status: Former     Types: Cigarettes     Quit date:      Years since quittin.4   • Smokeless tobacco: Never   • Tobacco comments:     caffeine - coffee, tea and soda   Vaping Use   • Vaping Use: Never used   Substance and Sexual Activity   • Alcohol use: Yes     Alcohol/week: 1.0 standard drink     Types: 1 Glasses of wine per week     Comment: on occas.   • Drug use: No   • Sexual activity: Defer       Family History   Problem Relation Age of Onset   • Hypertension Mother    • Heart disease Father         cabg       ROS         Objective:     Vitals:    23 1334   BP: 140/92   Pulse: (!) 140   Weight: 61.1 kg (134 lb 12.8 oz)   Height: 160 cm (63\")         Eyes:      General:         Right eye: No discharge.         Left eye: No discharge.   HENT:      Head: Normocephalic and atraumatic.   Neck:      Thyroid: No thyromegaly.      Vascular: No JVD. " "  Pulmonary:      Effort: Pulmonary effort is normal.      Breath sounds: Normal breath sounds. No rales.   Cardiovascular:      Normal rate. Irregularly irregular rhythm.      No gallop.   Edema:     Peripheral edema absent.   Abdominal:      General: Bowel sounds are normal.      Palpations: Abdomen is soft.      Tenderness: There is no abdominal tenderness.   Musculoskeletal: Normal range of motion.         General: No deformity. Skin:     General: Skin is warm and dry.      Findings: No erythema.   Neurological:      Mental Status: Alert and oriented to person, place, and time.      Motor: Normal muscle tone.   Psychiatric:         Behavior: Behavior normal.         Thought Content: Thought content normal.           ECG 12 Lead    Date/Time: 5/12/2023 2:12 PM  Performed by: Wilder Zamudio MD  Authorized by: Wilder Zamudio MD   Comparison: compared with previous ECG   Similar to previous ECG  Rhythm: atrial fibrillation            Lab Review:       Assessment:          Diagnosis Plan   1. Atrial fibrillation with RVR        2. H/O cardiac radiofrequency ablation--PVI 1/2023        3. Benign essential HTN               Plan:       Her AF is not fixable.     But she has no symptoms now and we can simply due \"rate\" control     I will increase the dilitiazem. And cut the lisinopril in half     I will do a monitor after 2 weeks.     "

## 2023-05-26 ENCOUNTER — TELEPHONE (OUTPATIENT)
Dept: CARDIOLOGY | Facility: CLINIC | Age: 83
End: 2023-05-26
Payer: MEDICARE

## 2023-05-26 NOTE — TELEPHONE ENCOUNTER
Let her know that we reviewed the blood pressure and heart rate readings that she brought in and they look good.

## 2023-08-18 RX ORDER — DILTIAZEM HYDROCHLORIDE 360 MG/1
CAPSULE, EXTENDED RELEASE ORAL
Qty: 90 CAPSULE | Refills: 1 | Status: SHIPPED | OUTPATIENT
Start: 2023-08-18

## 2023-08-25 ENCOUNTER — OFFICE VISIT (OUTPATIENT)
Dept: CARDIOLOGY | Facility: CLINIC | Age: 83
End: 2023-08-25
Payer: MEDICARE

## 2023-08-25 VITALS
WEIGHT: 137 LBS | DIASTOLIC BLOOD PRESSURE: 72 MMHG | SYSTOLIC BLOOD PRESSURE: 104 MMHG | HEIGHT: 63 IN | BODY MASS INDEX: 24.27 KG/M2 | HEART RATE: 93 BPM

## 2023-08-25 DIAGNOSIS — I48.19 ATRIAL FIBRILLATION, PERSISTENT: Primary | ICD-10-CM

## 2023-08-25 DIAGNOSIS — I10 BENIGN ESSENTIAL HTN: ICD-10-CM

## 2023-08-25 DIAGNOSIS — Z98.890 H/O CARDIAC RADIOFREQUENCY ABLATION: ICD-10-CM

## 2023-08-25 PROCEDURE — 3074F SYST BP LT 130 MM HG: CPT | Performed by: NURSE PRACTITIONER

## 2023-08-25 PROCEDURE — 1160F RVW MEDS BY RX/DR IN RCRD: CPT | Performed by: NURSE PRACTITIONER

## 2023-08-25 PROCEDURE — 93000 ELECTROCARDIOGRAM COMPLETE: CPT | Performed by: NURSE PRACTITIONER

## 2023-08-25 PROCEDURE — 1159F MED LIST DOCD IN RCRD: CPT | Performed by: NURSE PRACTITIONER

## 2023-08-25 PROCEDURE — 3078F DIAST BP <80 MM HG: CPT | Performed by: NURSE PRACTITIONER

## 2023-08-25 PROCEDURE — 99214 OFFICE O/P EST MOD 30 MIN: CPT | Performed by: NURSE PRACTITIONER

## 2023-08-25 RX ORDER — VALACYCLOVIR HYDROCHLORIDE 1 G/1
1000 TABLET, FILM COATED ORAL 3 TIMES DAILY
Qty: 15 TABLET | Refills: 0 | COMMUNITY
Start: 2023-08-24 | End: 2023-08-29

## 2023-08-25 NOTE — PROGRESS NOTES
"Date of Office Visit: 2023  Encounter Provider: TAYLOR Sharma  Place of Service: [facility] CARDIOLOGY  Patient Name: Berta Garay  :1940    Chief Complaint   Patient presents with    Atrial Fibrillation w/RVR    Hypertension          :     HPI: Berta Garay is a 83 y.o. female who is a patient of Dr. Joseph's--- new onset persistent A-fib and , status post TY cardioversion in 2022----maintained sinus rhythm for about 5 months, it recurred in early November, she was symptomatic, intolerant to beta-blockers but able to tolerate calcium channel blockers.    She saw Dr. Zamudio, she had a PVI in 2023, she did have a TY prior to procedure which showed an EF of 66-70%, mild AI, moderate MR.    I saw her in February for follow-up, she felt good but she was in A-fib with RVR, heart rate was in the low 100s, she had a cardioversion on 2023.    She again had recurrence, redo ablation was recommended.    3/30/2023 Redo ablation---PV's were isolated, ablation outside of pul veins along the left atrial roof to create roofline block, ablation in posterior wall of the LA using box lesion set to isolate the LA posterior wall, successful redo ablation in the LA ridge between the appendage and pul vein that was isolated.    Dr. Zamudio saw her in May---was back in AF, 's--\"Her AF  is not fixable\"---he increased the diltiazem, decreased lisinopril and follow up 24 Hr monitor in  which showed AF, avg .    She presents for follow up.     She recently sent a message about blisters in her mouth--she thought perhaps secondary to increased dilt--saw PCP---started on valcyclovir---likely viral.     Otherwise she is doing well. No chest pain, dyspnea, PND, orthopnea or edema.     She does not monitor HR & b/p too often---it makes her anxious---she is always on the go--busy and really has no issues. She says sometimes she is tired but not " persistent.     She walks 1-1/2 miles--no issues.     Apixaban for AC.     Past Medical History:   Diagnosis Date    Atrial fibrillation     Colon polyp     GERD (gastroesophageal reflux disease)     Hyperlipidemia     Hypertension        Past Surgical History:   Procedure Laterality Date    CARDIAC ELECTROPHYSIOLOGY PROCEDURE N/A 1/10/2023    Procedure: Ablation atrial fibrillation;  Surgeon: Wilder Zamudio MD;  Location:  [facility] CATH INVASIVE LOCATION;  Service: Cardiovascular;  Laterality: N/A;    CARDIAC ELECTROPHYSIOLOGY PROCEDURE N/A 3/30/2023    Procedure: Ablation atrial fibrillation--redo;  Surgeon: Wilder Zamudio MD;  Location:  [facility] CATH [facility]VASIVE LOCATION;  Service: Cardiovascular;  Laterality: N/A;    COLONOSCOPY      COLONOSCOPY N/A 2018    Procedure: COLONOSCOPY w/ polypectomy;  Surgeon: Gilberto Stringer MD;  Location:  [facility] OR;  Service: Gastroenterology    COLONOSCOPY W/ POLYPECTOMY N/A 11/15/2021    Procedure: COLONOSCOPY; POLYPECTOMY;  Surgeon: Gilberto Stringer MD;  Location:  [facility] OR;  Service: Gastroenterology;  Laterality: N/A;  POLYP  DIVERTICULOSIS    ENDOSCOPY      EYE SURGERY      Bilateral Cataracts       Social History     Socioeconomic History    Marital status:    Tobacco Use    Smoking status: Former     Types: Cigarettes     Quit date:      Years since quittin.6    Smokeless tobacco: Never    Tobacco comments:     caffeine - coffee, tea and soda   Vaping Use    Vaping Use: Never used   Substance and Sexual Activity    Alcohol use: Yes     Alcohol/week: 1.0 standard drink     Types: 1 Glasses of wine per week     Comment: on occas.    Drug use: No    Sexual activity: Defer       Family History   Problem Relation Age of Onset    Hypertension Mother     Heart disease Father         cabg       Review of Systems   Constitutional: Negative for chills, fever and malaise/fatigue.   Cardiovascular:  Negative for chest pain, dyspnea on exertion, leg  "swelling, near-syncope, orthopnea, palpitations, paroxysmal nocturnal dyspnea and syncope.   Respiratory:  Negative for cough and shortness of breath.    Hematologic/Lymphatic: Negative.    Musculoskeletal:  Negative for joint pain, joint swelling and myalgias.   Gastrointestinal:  Negative for abdominal pain, diarrhea, melena, nausea and vomiting.   Genitourinary:  Negative for frequency and hematuria.   Neurological:  Negative for light-headedness, numbness, paresthesias and seizures.   Allergic/Immunologic: Negative.    All other systems reviewed and are negative.    Allergies   Allergen Reactions    Sulfa Antibiotics Hives    Cefdinir Rash    Clarithromycin Rash         Current Outpatient Medications:     apixaban (Eliquis) 5 MG tablet tablet, Take 1 tablet by mouth Every 12 (Twelve) Hours., Disp: 60 tablet, Rfl: 11    citalopram (CeleXA) 20 MG tablet, Take 1 tablet by mouth Daily., Disp: , Rfl:     dilTIAZem CD (CARDIZEM CD) 360 MG 24 hr capsule, TAKE 1 CAPSULE BY MOUTH EVERY DAY, Disp: 90 capsule, Rfl: 1    lansoprazole (PREVACID) 30 MG capsule, Take 1 capsule by mouth Daily., Disp: , Rfl:     lisinopril (PRINIVIL,ZESTRIL) 40 MG tablet, Take 1 tablet by mouth Daily., Disp: , Rfl:     multivitamin with minerals tablet tablet, Take 1 tablet by mouth Daily., Disp: , Rfl:     tretinoin (RETIN-A) 0.025 % cream, Apply  topically to the appropriate area as directed See Admin Instructions., Disp: , Rfl:     valACYclovir (VALTREX) 1000 MG tablet, Take 1 tablet by mouth 3 (Three) Times a Day., Disp: 15 tablet, Rfl: 0    VITAMIN D, CHOLECALCIFEROL, PO, Take  by mouth., Disp: , Rfl:       Objective:     Vitals:    08/25/23 1338   BP: 104/72   Pulse: 93   Weight: 62.1 kg (137 lb)   Height: 160 cm (63\")     Body mass index is 24.27 kg/mý.    PHYSICAL EXAM:    Vitals Reviewed.   General Appearance: No acute distress, well developed and well nourished.   Eyes: Conjunctiva and lids: No erythema, swelling, or discharge. Sclera " "non-icteric.   HENT: Atraumatic, normocephalic. External eyes, ears, and nose normal.   Respiratory: No signs of respiratory distress. Respiration rhythm and depth normal.   Clear to auscultation. No rales, crackles, rhonchi, or wheezing auscultated.   Cardiovascular:  Heart Rate and Rhythm: Irregularly, irregular. Heart Sounds: S1 and S2.  Murmurs: No murmurs noted. No rubs, thrills, or gallops.    Lower Extremities: No edema noted.  Gastrointestinal:  Abdomen soft, non-distended, non-tender.   Musculoskeletal: Normal movement of extremities  Skin: Warm and dry.   Psychiatric: Patient alert and oriented to person, place, and time. Speech and behavior appropriate. Normal mood and affect.       ECG 12 Lead    Date/Time: 8/25/2023 1:43 PM  Performed by: Astrid Blackwell APRN  Authorized by: Astrid Blackwell APRN   Comparison: compared with previous ECG   Similar to previous ECG  Rhythm: atrial fibrillation  BPM: 93          Assessment:       Diagnosis Plan   1. Atrial fibrillation, persistent  ECG 12 Lead      2. H/O cardiac radiofrequency ablation--PVI 1/2023, redo 3/2023        3. Benign essential HTN               Plan:       1.-3. Persistent afib, s/p PVI x 2---last one 3/2023, PV's were isolated, recurrence after second ablation--per Lizz's note--\"her AF is not fixabable\"--June monitor avg --a little high, HR 93 today---will recheck 24 hour Holter---if okay leave her be---if avg still on the higher side, consider adding digoxin.     3. HTN, controlled. 104/72 today--I ask her to check in maybe once per week---if persistently that low --could probably decrease lisinopril even further.    Follow up with Dr. Zamudio in 6 months. Will call with Holter results when available.     As always, it has been a pleasure to participate in your patient's care.      Sincerely,         TAYLOR Tafoya  "

## 2023-08-31 ENCOUNTER — TELEPHONE (OUTPATIENT)
Dept: CARDIOLOGY | Facility: CLINIC | Age: 83
End: 2023-08-31
Payer: MEDICARE

## 2023-08-31 NOTE — TELEPHONE ENCOUNTER
----- Message from TAYLOR Sharma sent at 8/30/2023  3:46 PM EDT -----  Let her know her monitor was okay, avg --reviewed with Dr. Zamudio and we both think that is okay, no med changes at this time

## 2024-02-11 NOTE — OP NOTE
COLONOSCOPY  Procedure Report    Patient Name:  Berta Garay  YOB: 1940    Date of Surgery:  11/15/2021     Indications:  Encounter for screening for malignant neoplasm of colon [Z12.11]  Personal history of colonic polyps [Z86.010]      Pre-op Diagnosis:   Encounter for screening for malignant neoplasm of colon [Z12.11]  Personal history of colonic polyps [Z86.010]    Post-Op Diagnosis Codes:     * Encounter for screening for malignant neoplasm of colon [Z12.11]     * Personal history of colonic polyps [Z86.010]     * Diverticulosis [K57.90]     * Colon polyp [K63.5]         Procedure/CPT® Codes:      Procedure(s):  COLONOSCOPY; POLYPECTOMY    Staff:  Surgeon(s):  Gilberto Stringer MD         Anesthesia: Monitored Anesthesia Care    Estimated Blood Loss: none    Specimens:   ID Type Source Tests Collected by Time   A :  Polyp Large Intestine, Cecum TISSUE PATHOLOGY EXAM Gilberto Stringer MD 11/15/2021 0827       Implants:    Nothing was implanted during the procedure      Description of Procedure: After having signed informed consent, she was brought to the endoscopy suite and placed in the left lateral decubitus position. She was given intravenous sedation. Rectal exam revealed no external lesions, normal anal tone, and no rectal mass. The scope was introduced into the rectum and advanced under direct visualization through a moderately tortuous colon. The scope was advanced through the rectum, sigmoid colon, past multiple diverticular openings, through the descending colon, to and around the splenic flexure, reduced and advanced to the transverse colon with significant looping, eventually to and around the hepatic flexure. The scope was reduced in the ascending colon, but would not advance easily to the valve. The scope was reduced, falling back into the transverse colon. Using abdominal splinting and increasing the variable resistance, the scope could be advanced still with  The patient is a 81y Female complaining of chest pain. some looping around the hepatic flexure and into the ascending colon, reduced somewhat, and then advanced still with some looping into the cecum. The cecum was identified by the appendiceal orifice and ileocecal valve. There was a diminutive polyp noted in the cecum that was 4 mm in size. It was biopsied, felt to be completely removed and sent separately as cecal polyp. The scope was then withdrawn through the remainder of the colon. No further mucosal lesions were noted throughout the remainder of the exam and the colon was well prepped throughout. The diverticulum were limited to the sigmoid colon. Within the rectum, the scope was retroflexed revealing an intact dentate line and no mucosal lesions. The scope was deretroflexed and withdrawn. The patient tolerated the procedure very well.            Findings: Colon to Cecum Good Prep  Sigmoid Diverticulosis  Polyp-Biopsy     Complications: None    Recommendations: Results to be called.      Gilberto Stringer MD     Date: 11/15/2021  Time: 08:41 EST

## 2024-03-22 ENCOUNTER — OFFICE VISIT (OUTPATIENT)
Age: 84
End: 2024-03-22
Payer: MEDICARE

## 2024-03-22 VITALS
HEIGHT: 63 IN | BODY MASS INDEX: 25.52 KG/M2 | DIASTOLIC BLOOD PRESSURE: 80 MMHG | HEART RATE: 90 BPM | WEIGHT: 144 LBS | SYSTOLIC BLOOD PRESSURE: 122 MMHG

## 2024-03-22 DIAGNOSIS — I48.19 ATRIAL FIBRILLATION, PERSISTENT: ICD-10-CM

## 2024-03-22 DIAGNOSIS — Z98.890 H/O CARDIAC RADIOFREQUENCY ABLATION: Primary | ICD-10-CM

## 2024-03-22 RX ORDER — ATORVASTATIN CALCIUM 40 MG/1
40 TABLET, FILM COATED ORAL DAILY
COMMUNITY
Start: 2024-02-05 | End: 2024-08-03

## 2024-03-22 NOTE — PROGRESS NOTES
Date of Office Visit: 2024  Encounter Provider: Wilder Zamudio MD  Place of Service: [facility] CARDIOLOGY  Patient Name: Berta Garay  : 1940    Subjective:     Encounter Date:2024      Patient ID: Berta Garay is a 83 y.o. female who has a cc of  pers AF and two PVIs and LA procedures. She still has pers AF but is tolerating it fine.     She has some back pain and sciatica.     No anginal chest pain,   No sig leal,   No soa,   No fainting,  No orthostasis.   No edema.   Exercise tolerance: really good. She walks but is most limited by sciatica     There have been no hospital admission since the last visit.     There have been no bleeding events.       Past Medical History:   Diagnosis Date    Atrial fibrillation     Colon polyp     GERD (gastroesophageal reflux disease)     Hyperlipidemia     Hypertension        Social History     Socioeconomic History    Marital status:    Tobacco Use    Smoking status: Former     Current packs/day: 0.00     Types: Cigarettes     Quit date:      Years since quittin.2    Smokeless tobacco: Never    Tobacco comments:     caffeine - coffee, tea and soda   Vaping Use    Vaping status: Never Used   Substance and Sexual Activity    Alcohol use: Yes     Alcohol/week: 1.0 standard drink of alcohol     Types: 1 Glasses of wine per week     Comment: on occas.    Drug use: No    Sexual activity: Defer       Family History   Problem Relation Age of Onset    Hypertension Mother     Heart disease Father         cabg       Review of Systems   Constitutional: Negative for fever and night sweats.   HENT:  Negative for ear pain and stridor.    Eyes:  Negative for discharge and visual halos.   Cardiovascular:  Negative for cyanosis.   Respiratory:  Negative for hemoptysis and sputum production.    Hematologic/Lymphatic: Negative for adenopathy.   Skin:  Negative for nail changes and unusual hair distribution.   Musculoskeletal:  Positive  "for arthritis, back pain and joint pain. Negative for gout and joint swelling.   Gastrointestinal:  Negative for bowel incontinence and flatus.   Genitourinary:  Negative for dysuria and flank pain.   Neurological:  Negative for seizures and tremors.   Psychiatric/Behavioral:  Negative for altered mental status. The patient is not nervous/anxious.             Objective:     Vitals:    03/22/24 1119   BP: 122/80   Pulse: 90   Weight: 65.3 kg (144 lb)   Height: 160 cm (63\")         Eyes:      General:         Right eye: No discharge.         Left eye: No discharge.   HENT:      Head: Normocephalic and atraumatic.   Neck:      Thyroid: No thyromegaly.      Vascular: No JVD.   Pulmonary:      Effort: Pulmonary effort is normal.      Breath sounds: Normal breath sounds. No rales.   Cardiovascular:      Normal rate. Irregularly irregular rhythm.      Murmurs: There is a grade 2/6 early systolic murmur.      No gallop.    Edema:     Peripheral edema absent.   Abdominal:      General: Bowel sounds are normal.      Palpations: Abdomen is soft.      Tenderness: There is no abdominal tenderness.   Musculoskeletal: Normal range of motion.         General: No deformity. Skin:     General: Skin is warm and dry.      Findings: No erythema.   Neurological:      Mental Status: Alert and oriented to person, place, and time.      Motor: Normal muscle tone.   Psychiatric:         Behavior: Behavior normal.         Thought Content: Thought content normal.           ECG 12 Lead    Date/Time: 3/22/2024 11:53 AM  Performed by: Wilder Zamudio MD    Authorized by: Wilder Zamudio MD  Comparison: compared with previous ECG   Similar to previous ECG  Rhythm: atrial fibrillation          Lab Review:       Assessment:          Diagnosis Plan   1. H/O cardiac radiofrequency ablation--PVI 1/2023, redo 3/2023        2. Atrial fibrillation, persistent               Plan:     AF -- nicely controlled on diltiazem. On a-ban. She feels good and has " good rate control. THere is an early systolic murmur but she has no symptoms.     I think we get an echo next year.     All is good.     HTN -- controlled on meds and no orthostasis

## 2024-06-06 ENCOUNTER — HOSPITAL ENCOUNTER (OUTPATIENT)
Dept: PHYSICAL THERAPY | Facility: HOSPITAL | Age: 84
Setting detail: THERAPIES SERIES
Discharge: HOME | End: 2024-06-06
Payer: MEDICARE

## 2024-06-06 DIAGNOSIS — M54.42 ACUTE BILATERAL LOW BACK PAIN WITH LEFT-SIDED SCIATICA: Primary | ICD-10-CM

## 2024-06-06 DIAGNOSIS — M54.32 SCIATICA OF LEFT SIDE: ICD-10-CM

## 2024-06-06 PROCEDURE — 97161 PT EVAL LOW COMPLEX 20 MIN: CPT

## 2024-06-06 PROCEDURE — 97110 THERAPEUTIC EXERCISES: CPT

## 2024-06-06 NOTE — THERAPY EVALUATION
Outpatient Physical Therapy Ortho Initial Evaluation   [place]     Patient Name: Berta Garay  : 1940  MRN: [id number]  Today's Date: 2024      Visit Date: 2024      Patient Active Problem List   Diagnosis    Screening for colon cancer    Encounter for screening for malignant neoplasm of colon    Personal history of colonic polyps    Atrial fibrillation with RVR    Benign essential HTN    Hyperlipidemia LDL goal <100    H/O cardiac radiofrequency ablation--PVI 2023, redo 3/2023    Atrial fibrillation, persistent        Past Medical History:   Diagnosis Date    Atrial fibrillation     Colon polyp     GERD (gastroesophageal reflux disease)     Hyperlipidemia     Hypertension         Past Surgical History:   Procedure Laterality Date    CARDIAC ELECTROPHYSIOLOGY PROCEDURE N/A 1/10/2023    Procedure: Ablation atrial fibrillation;  Surgeon: Wilder Zamudio MD;  Location:  [facility];  Service: Cardiovascular;  Laterality: N/A;    CARDIAC ELECTROPHYSIOLOGY PROCEDURE N/A 3/30/2023    Procedure: Ablation atrial fibrillation--redo;  Surgeon: Wilder Zamudio MD;  Location:  [facility];  Service: Cardiovascular;  Laterality: N/A;    COLONOSCOPY      COLONOSCOPY N/A 2018    Procedure: COLONOSCOPY w/ polypectomy;  Surgeon: Gilberto Stringer MD;  Location:  [facility];  Service: Gastroenterology    COLONOSCOPY W/ POLYPECTOMY N/A 11/15/2021    Procedure: COLONOSCOPY; POLYPECTOMY;  Surgeon: Gilberto Stringer MD;  Location:  [facility];  Service: Gastroenterology;  Laterality: N/A;  POLYP  DIVERTICULOSIS    ENDOSCOPY      EYE SURGERY      Bilateral Cataracts       Visit Dx:     ICD-10-CM ICD-9-CM   1. Acute bilateral low back pain with left-sided sciatica  M54.42 724.2     724.3   2. Sciatica of left side  M54.32 724.3          Patient History       Row Name 24 0800             History    Chief Complaint Difficulty Walking;Muscle  "tenderness;Muscle weakness;Pain;Tightness  -LN      Type of Pain Back pain;Lower Extremity / Leg  L>R LBP; L leg  -LN      Date Current Problem(s) Began --  a couple months  -LN      Brief Description of Current Complaint Pt with c/o LBP (primarily L sided) for a couple months with pain down L leg and into lateral lower leg; no N/T. \"My leg feels sensitive.\" Occasional pain into L foot (with extended walking). Pt normally walks 2-2.5 miles a day and hasn't been able to do that recently. Pt with hx of periodic  pain in back but usually goes away and doesn't last this long. 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 No specific injury reported. No tests done yet. \"The doctor and I think it is sciatica.\"  -LN      Previous treatment for THIS PROBLEM Medication  1 extra strength tylenol; exercises  -LN      Patient/Caregiver Goals Relieve pain;Improve mobility;Improve strength;Know what to do to help the symptoms;Return to prior level of function  -LN      Patient/Caregiver Goals Comment \"correct exercise\"  -LN      Occupation/sports/leisure activities retired ; likes to walk and garden  -LN      Patient seeing anyone else for problem(s)? PCP  -LN      How has patient tried to help current problem? exercises she found online;tylenol; rest; stretching  -LN      Results of Clinical Tests no tests done yet  -LN      Related/Recent Hospitalizations Yes  -LN      Surgery/Hospitalization heart cath- 1 night in hospital due to a-fib  -LN      History of Previous Related Injuries none reported  -LN         Pain     Pain Location Back;Leg  L leg  -LN      Pain at Present 5  -LN      Pain at Best 0  -LN      Pain at Worst 7  -LN      Pain Frequency Constant/continuous  -LN      Pain Description Sore;Radiating;Aching  -LN      What Performance Factors Make the Current Problem(s) WORSE? to stand up; walking extended periods; rolling over in bed; going up steps  -LN      What Performance Factors Make the " Current Problem(s) BETTER? lying down in hooklying; or between legs iin S/L; changing positions  -LN      Tolerance Time- Standing limited to 1 hour  -LN      Tolerance Time- Sitting good tolerance; pain with standing up from chair  -LN      Tolerance Time- Walking limitedt to 1 mile  -LN      Tolerance Time- Lying does sometimes wake up when rolling onto side  -LN      Is your sleep disturbed? Yes  on a busy day  -LN      What position do you sleep in? Right sidelying;Left sidelying  -LN      Difficulties at work? retired  -LN      Difficulties with ADL's? none reported  -LN      Difficulties with recreational activities? limited walking distance; normally walks 2-2.5 miles a day  -LN         Fall Risk Assessment    Any falls in the past year: No  -LN         Services    Prior Rehab/Home Health Experiences No  -LN      Are you currently receiving Home Health services No  -LN      Do you plan to receive Home Health services in the near future No  -LN         Daily Activities    Primary Language English  -LN      Are you able to read Yes  -LN      Are you able to write Yes  -LN      How does patient learn best? Listening;Demonstration  -LN      Teaching needs identified Home Exercise Program;Management of Condition;Other (comment)  Risks and benefits of treatment explained to pt  -LN      Patient is concerned about/has problems with Bed Mobility;Flexibility;Performing home management (household chores, shopping, care of dependents);Performing job responsibilities/community activities (work, school,;Performing sports, recreation, and play activities;Standing;Transfers (getting out of a chair, bed);Walking  -LN      Does patient have problems with the following? None  -LN      Barriers to learning None  -LN      Pt Participated in POC and Goals Yes  -LN         Safety    Are you being hurt, hit, or frightened by anyone at home or in your life? No  -LN      Are you being neglected by a caregiver No  -LN      Have you  had any of the following issues with N/A  -LN                User Key  (r) = Recorded By, (t) = Taken By, (c) = Cosigned By      Initials Name Provider Type    LN Jael Leyva, PT Physical Therapist                     PT Ortho       Row Name 06/06/24 0800       Subjective    Subjective Comments Pt reports it has gotten better but just won't go away. Pt reports pain in L>R LBP and down into L buttocks and leg/lateral shin and occasionally into L ankle/foot. No c/o any N/T in legs. Pt reports increased pain with standing and walking and affects her daily walking tolerance.  -LN       Precautions and Contraindications    Precautions/Limitations no known precautions/limitations  -LN       Subjective Pain    Able to rate subjective pain? yes  -LN    Pre-Treatment Pain Level 5  -LN    Subjective Pain Comment Pt reported less pain after session.  -LN       Posture/Observations    Forward Head Mild  -LN    Thoracic Kyphosis Mild;Increased  -LN    Rounded Shoulders Bilateral:;Mild  -LN    Lumbar lordosis Normal  -LN    Iliac crests Left:;Elevated;Mild;Standing posture  -LN       Lumbar/SI Special Tests    Standing Flexion Test (SI Dysfunction) Positive;Left:  -LN    SLR (Neural Tension) Bilateral:;Negative  -LN    FAIR Test (Piriformis Syndrome) Left:;Positive  -LN       Lumbosacral Palpation    SI Left:;Tender  -LN    Piriformis Left:;Tender;Guarded/taut  -LN    Gluteus Doug Left:;Tender;Guarded/taut  -LN    Erector Spinae (Paraspinals) Left:;Tender;Guarded/taut  -LN    IT Band Left:;Tender;Guarded/taut  -LN       Hip/Thigh Palpation    Gluteus Doug Left:;Tender;Guarded/taut  -LN    Gluteus Medius Left:;Tender;Guarded/taut  -LN    Gluteus Minimus Left:;Tender;Guarded/taut  -LN    Piriformis Left:;Tender;Guarded/taut  -LN    SI Left:;Tender;Guarded/taut  -LN    ITB Left:;Tender;Guarded/taut  -LN       Hip Special Tests    Rex’s test (tightness of ITB) Left:;Positive  -LN    FAIR test (piriformis syndrome)  Left:;Positive  -LN       Leg Length Test    True Equal  -LN    Apparent Unequal;Left Higher Leg  sx of L pelvic obliquity  -LN       Head/Neck/Trunk    Trunk Extension AROM WFL- pain and tends to go more to R  -LN    Trunk Flexion AROM WNL  -LN    Trunk Lt Lateral Flexion AROM WFL- L sided pain  -LN    Trunk Rt Lateral Flexion AROM WFL  -LN    Trunk Lt Rotation AROM WFL- mild L sided pain  -LN    Trunk Rt Rotation AROM WFL- mild L sided pain  -LN       MMT Neck/Trunk    Trunk Flexion MMT, Gross Movement (4-/5) good minus  grossly  -LN    Trunk Extension MMT, Gross Movement (3+/5) fair plus  grossly  -LN       MMT Right Lower Ext    Rt Hip Flexion MMT, Gross Movement (5/5) normal  -LN    Rt Hip Extension MMT, Gross Movement (5/5) normal  -LN    Rt Hip ABduction MMT, Gross Movement (5/5) normal  -LN    Rt Hip ADduction MMT, Gross Movement (5/5) normal  -LN    Rt Knee Extension MMT, Gross Movement (5/5) normal  -LN    Rt Knee Flexion MMT, Gross Movement (5/5) normal  -LN    Rt Ankle Plantarflexion MMT, Gross Movement (5/5) normal  -LN    Rt Ankle Dorsiflexion MMT, Gross Movement (5/5) normal  -LN    Rt Lower Extremity Comments  --  -LN       MMT Left Lower Ext    Lt Hip Flexion MMT, Gross Movement (4+/5) good plus  -LN    Lt Hip Extension MMT, Gross Movement (5/5) normal  -LN    Lt Hip ABduction MMT, Gross Movement (5/5) normal  -LN    Lt Hip ADduction MMT, Gross Movement (5/5) normal  -LN    Lt Knee Extension MMT, Gross Movement (5/5) normal  -LN    Lt Knee Flexion MMT, Gross Movement (5/5) normal  -LN    Lt Ankle Plantarflexion MMT, Gross Movement (5/5) normal  -LN    Lt Ankle Dorsiflexion MMT, Gross Movement (5/5) normal  -LN    Lt Lower Extremity Comments  No pain with MMT  -LN       Sensation    Sensation WNL? WNL  -LN    Light Touch No apparent deficits  -LN    Additional Comments no c/o any N/T in legs  -LN       Lower Extremity Flexibility    Hamstrings Bilateral:;Moderately limited  -LN    ITB  Bilateral:;Moderately limited  -LN    Gastrocnemius Bilateral:;Mildly limited  -LN    Soleus Bilateral:;Mildly limited  -LN       Transfers    Sit-Stand [place] (Transfers) independent  -LN    Stand-Sit [place] (Transfers) independent  -LN    Transfers, Sit-Stand-Sit, Assist Device other (see comments)  none used  -LN       Gait/Stairs (Locomotion)    [place] Level (Gait) independent  -LN    Assistive Device (Gait) other (see comments)  none used  -LN    Comment, (Gait/Stairs) Pt independent with all functional mobility and gait; no AD used and no antalgic gait noted.  -LN              User Key  (r) = Recorded By, (t) = Taken By, (c) = Cosigned By      Initials Name Provider Type    LN Jael Leyva, PT Physical Therapist                                Therapy Education  Education Details: Pt to work on HEP 2 x day as tolerated and use MH/CP PRN.  Pt avoid any heavy lifting and advised to only do exercises within a painfree range.  Given: HEP, Symptoms/condition management, Pain management, Posture/body mechanics  Program: New  How Provided: Verbal, Demonstration, Written  Provided to: Patient  Level of Understanding: Teach back education performed, Verbalized, Demonstrated      PT OP Goals       Row Name 06/06/24 0900          PT Short Term Goals    STG Date to Achieve 06/20/24  -LN     STG 1 Pt to verbally report decreased LBP to <5/10 at worst with ADLs and everyday activities.  -LN     STG 2 Pt to report decreased radiating L leg pain by 25%-50%.  -LN     STG 3 Pt independent with initial HEP issued by therapist.  -LN     STG 4 Pt able to maintain good pelvic alignment between PT sessions with use of MET & core stabilization program.  -LN     STG 5 Pt able to come to stand without any c/o increased LB or L leg pain.  -LN        Long Term Goals    LTG Date to Achieve 07/04/24  -LN     LTG 1 Pt to verbally report decreased LBP to <3/10 at worst with ADLs and everyday activities.  -LN      LTG 2 Pt to report decreased radiating L leg pain by 50%-75%.  -LN     LTG 3 Pt independent with more advanced HEP issued by therapist.  -LN     LTG 4 Trunk ROM WNL and painfree all planes.  -LN     LTG 5 L hip flexion strength improved to 5/5.  -LN     LTG 6 Pt able to walk 2-2.5 miles with no c/o any increased LB or left leg pain.  -LN     LTG 7 Decreased tenderness in L LB/LS/buttocks and L ITB/lateral leg to nominal.  -LN        Time Calculation    PT Goal Re-Cert Due Date 07/04/24  -LN               User Key  (r) = Recorded By, (t) = Taken By, (c) = Cosigned By      Initials Name Provider Type    Jael Archuleta, PT Physical Therapist                     PT Assessment/Plan       Row Name 06/06/24 0900          PT Assessment    Functional Limitations Impaired gait;Impaired locomotion;Limitation in home management;Limitations in community activities;Limitations in functional capacity and performance;Performance in leisure activities  -LN     Impairments Gait;Impaired flexibility;Locomotion;Muscle strength;Pain;Posture;Range of motion  -LN     Assessment Comments Pt presents with hx of occasional LBP with c/o increased LBP, L>R with pain radiating in L buttocks and down lateral leg x a couple months with no specific injury reported. No c/o any N/T in legs. Pt with mild pain with trunk extension; B rotation, and L lateral flexion but all planes WFL-WNL. Pt with decreased L hip flexor and decreased core strength; sx of L pelvic obliquity. Pt with decreased standing and walking tolerance. Pt with decreased tolerance to home/community and leisure activities due to above. Pt with sx of lumbar DDD with L sided sciatica; no signs of any disc herniation.  -LN     Please refer to paper survey for additional self-reported information Yes  -LN     Rehab Potential Good  -LN     Patient/caregiver participated in establishment of treatment plan and goals Yes  -LN     Patient would benefit from skilled therapy  intervention Yes  -LN        PT Plan    PT Frequency 1x/week  -LN     Predicted Duration of Therapy Intervention (PT) 4 weeks  -LN     Planned CPT's? PT EVAL LOW COMPLEXITY: 56563;PT RE-EVAL: 12122;PT THER PROC EA 15 MIN: 73075;PT MANUAL THERAPY EA 15 MIN: 35371;PT HOT OR COLD PACK TREAT MCARE;PT ELECTRICAL STIM UNATTEND: ;PT ULTRASOUND EA 15 MIN: 69481;PT TRACTION LUMBAR: 27297  -LN     Physical Therapy Interventions (Optional Details) home exercise program;manual therapy techniques;modalities;patient/family education;postural re-education;ROM (Range of Motion);strengthening;stretching;taping;dry needling  -LN     PT Plan Comments See pt 1 x week for therapeutic exercise/core stabilization with HEP; modalities PRN (IFC/MH/CP/US); MET PRN; consider trial of lumbar traction as indicated; pt and posture education; kinesiotape PRN; consider trial of dry needling.  -LN               User Key  (r) = Recorded By, (t) = Taken By, (c) = Cosigned By      Initials Name Provider Type    Jael Archuleta, PT Physical Therapist                     Modalities       Row Name 06/06/24 0800             Moist Heat    MH Applied Yes  -LN      Location LB/LS/buttocks area with pt supine in hooklying.  -LN      PT Moist Heat Minutes 12  -LN      MH S/P Rx Yes  -LN                User Key  (r) = Recorded By, (t) = Taken By, (c) = Cosigned By      Initials Name Provider Type    Jael Archuleta, PT Physical Therapist                   OP Exercises       Row Name 06/06/24 0800             Precautions    Existing Precautions/Restrictions no known precautions/restrictions  -LN         Subjective    Subjective Comments Pt reports it has gotten better but just won't go away. Pt reports pain in L>R LBP and down into L buttocks and leg/lateral shin and occasionally into L ankle/foot. No c/o any N/T in legs. Pt reports increased pain with standing and walking and affects her daily walking tolerance.  -LN          Subjective Pain    Able to rate subjective pain? yes  -LN      Pre-Treatment Pain Level 5  -LN      Subjective Pain Comment Pt reported less pain after session.  -LN         Total Minutes    51750 - PT Therapeutic Exercise Minutes 15  -LN         Exercise 1    Exercise Name 1 LTR  -LN      Cueing 1 Verbal;Tactile;Demo  -LN      Reps 1 5  -LN      Time 1 5 sec  -LN         Exercise 2    Exercise Name 2 DKC  -LN      Cueing 2 Verbal;Tactile;Demo  -LN      Reps 2 5  -LN      Time 2 10 sec  -LN         Exercise 3    Exercise Name 3 supine HS stretch with sheet  -LN      Cueing 3 Verbal;Tactile;Demo  -LN      Reps 3 5  -LN      Time 3 10 sec  -LN      Additional Comments to do B at home  -LN         Exercise 4    Exercise Name 4 supine HS stretch with sheet with adduction (ITB)  -LN      Cueing 4 Verbal;Tactile;Demo  -LN      Reps 4 5  -LN      Time 4 10 sec  -LN      Additional Comments to do B at home  -LN         Exercise 5    Exercise Name 5 supine modified piriformis stretch  -LN      Cueing 5 Verbal;Tactile;Demo  -LN      Reps 5 3  -LN      Time 5 20 sec  -LN      Additional Comments to do B at home  -LN         Exercise 6    Exercise Name 6 seated Nithya stretch/piriformis stretch seated  -LN      Cueing 6 Verbal;Tactile;Demo  -LN      Reps 6 3  -LN      Time 6 20 sec  -LN      Additional Comments to do B at home  -LN         Exercise 7    Exercise Name 7 PPT  -LN      Cueing 7 Verbal;Tactile;Demo  -LN      Reps 7 10  -LN      Time 7 5 sec  -LN         Exercise 8    Exercise Name 8 PPT with hooklying ball squeeze  -LN      Cueing 8 Verbal;Tactile;Demo  -LN      Reps 8 10  -LN      Time 8 5 sec  -LN         Exercise 9    Exercise Name 9 PPT with supine clam shells vs TB  -LN      Cueing 9 Verbal;Tactile;Demo  -LN      Reps 9 10  -LN      Time 9 5 sec  -LN      Additional Comments silver TB  -LN         Exercise 10    Exercise Name 10 Supine sciatic nerve glide  -LN      Cueing 10 Verbal;Tactile;Demo  -LN      Reps  10 3-5 reps  -LN      Time 10 with 10 AP  -LN      Additional Comments to do B at home  -LN                User Key  (r) = Recorded By, (t) = Taken By, (c) = Cosigned By      Initials Name Provider Type    Jael Archuleta, PT Physical Therapist                                  Outcome Measure Options: Other Outcome Measure  Other Outcome Measure Tool Used  Other Outcome Measure Tool Comments: Back index= 11 today.      Time Calculation:     Start Time: 0900  Stop Time: 0955  Time Calculation (min): 55 min  Timed Charges  21829 - PT Therapeutic Exercise Minutes: 15  Untimed Charges  PT Moist Heat Minutes: 12  Total Minutes  Timed Charges Total Minutes: 15  Untimed Charges Total Minutes: 12   Total Minutes: 27     Therapy Charges for Today       Code Description Service Date Service Provider Modifiers Qty    06081125155 HC PT THER PROC EA 15 MIN 6/6/2024 Jael Leyva, PT GP 1    03583541891 HC PT EVAL LOW COMPLEXITY 2 6/6/2024 Jael Leyva, PT GP 1            PT G-Codes  Outcome Measure Options: Other Outcome Measure         Jael Leyva, PT  6/6/2024

## 2024-06-13 ENCOUNTER — HOSPITAL ENCOUNTER (OUTPATIENT)
Dept: PHYSICAL THERAPY | Facility: HOSPITAL | Age: 84
Setting detail: THERAPIES SERIES
Discharge: HOME | End: 2024-06-13
Payer: MEDICARE

## 2024-06-13 DIAGNOSIS — M54.32 SCIATICA OF LEFT SIDE: ICD-10-CM

## 2024-06-13 DIAGNOSIS — M54.42 ACUTE BILATERAL LOW BACK PAIN WITH LEFT-SIDED SCIATICA: Primary | ICD-10-CM

## 2024-06-13 PROCEDURE — G0283 ELEC STIM OTHER THAN WOUND: HCPCS

## 2024-06-13 PROCEDURE — 97110 THERAPEUTIC EXERCISES: CPT

## 2024-06-13 NOTE — THERAPY TREATMENT NOTE
Outpatient Physical Therapy Ortho Treatment Note   Maryellen Hopper     Patient Name: Berta Garay  : 1940  MRN: [id number]  Today's Date: 2024      Visit Date: 2024    Visit Dx:    ICD-10-CM ICD-9-CM   1. Acute bilateral low back pain with left-sided sciatica  M54.42 724.2     724.3   2. Sciatica of left side  M54.32 724.3       Patient Active Problem List   Diagnosis    Screening for colon cancer    Encounter for screening for malignant neoplasm of colon    Personal history of colonic polyps    Atrial fibrillation with RVR    Benign essential HTN    Hyperlipidemia LDL goal <100    H/O cardiac radiofrequency ablation--PVI 2023, redo 3/2023    Atrial fibrillation, persistent        Past Medical History:   Diagnosis Date    Atrial fibrillation     Colon polyp     GERD (gastroesophageal reflux disease)     Hyperlipidemia     Hypertension         Past Surgical History:   Procedure Laterality Date    CARDIAC ELECTROPHYSIOLOGY PROCEDURE N/A 1/10/2023    Procedure: Ablation atrial fibrillation;  Surgeon: Wilder Zamudio MD;  Location:  [facility];  Service: Cardiovascular;  Laterality: N/A;    CARDIAC ELECTROPHYSIOLOGY PROCEDURE N/A 3/30/2023    Procedure: Ablation atrial fibrillation--redo;  Surgeon: Wilder Zamudio MD;  Location:  [facility];  Service: Cardiovascular;  Laterality: N/A;    COLONOSCOPY      COLONOSCOPY N/A 2018    Procedure: COLONOSCOPY w/ polypectomy;  Surgeon: Gilberto Stringer MD;  Location: [facility];  Service: Gastroenterology    COLONOSCOPY W/ POLYPECTOMY N/A 11/15/2021    Procedure: COLONOSCOPY; POLYPECTOMY;  Surgeon: Gilberto Stringer MD;  Location: [facility] OR;  Service: Gastroenterology;  Laterality: N/A;  POLYP  DIVERTICULOSIS    ENDOSCOPY      EYE SURGERY      Bilateral Cataracts        PT Ortho       Row Name 24 1300       Subjective    Subjective Comments Pt reports she had a massage yesterday and the  "person worked a lot on L side/buttocks and it felt great after it but than today it is more sore. \"I am feeling it a little further down my leg too. \"I have been on my feet since 8 am this morning.\"  -LN       Precautions and Contraindications    Precautions/Limitations no known precautions/limitations  -LN       Subjective Pain    Able to rate subjective pain? yes  -LN    Pre-Treatment Pain Level 5  -LN    Subjective Pain Comment with sitting and tylenol= 3/10  -LN              User Key  (r) = Recorded By, (t) = Taken By, (c) = Cosigned By      Initials Name Provider Type    Jael Archuleta, PT Physical Therapist                                 PT Assessment/Plan       Row Name 06/13/24 1400          PT Assessment    Assessment Comments Pt with increased soreness today after getting a massage yesterday, but she tolerated exercises well. She still needed MET for L pelvic obliquity but corrected easily. She did report some benefit from IFC & MH and reported feeling better after session with less pain reported. She is doing well with HEP.  -LN        PT Plan    PT Frequency 1x/week  -LN     PT Plan Comments Continue 1 x week; progress exercises as tolerated. Modalities PRN; assess benefit of IFC & MH. MET PRN.  -LN               User Key  (r) = Recorded By, (t) = Taken By, (c) = Cosigned By      Initials Name Provider Type    Jael Archuleta, PT Physical Therapist                     Modalities       Row Name 06/13/24 1300             Precautions    Existing Precautions/Restrictions no known precautions/restrictions  -LN         Moist Heat    MH Applied Yes  -LN      Location LB/LS/buttocks area & L hip with IFC with pt supine in hooklying.  -LN      PT Moist Heat Minutes --  15 minutes  -LN      MH S/P Rx Yes  -LN         ELECTRICAL STIMULATION    Attended/Unattended Unattended  -LN      Stimulation Type IFC  -LN      Location/Electrode Placement/Other L LS area/pirifomis with MH.  -LN      PT " "E-Stim Unattended Minutes 15  -LN                User Key  (r) = Recorded By, (t) = Taken By, (c) = Cosigned By      Initials Name Provider Type    LN Jael Leyva, PT Physical Therapist                   OP Exercises       Row Name 06/13/24 1400 06/13/24 1300          Precautions    Existing Precautions/Restrictions -- no known precautions/restrictions  -LN        Subjective    Subjective Comments -- Pt reports she had a massage yesterday and the person worked a lot on L side/buttocks and it felt great after it but than today it is more sore. \"I am feeling it a little further down my leg too. \"I have been on my feet since 8 am this morning.\"  -LN        Subjective Pain    Able to rate subjective pain? -- yes  -LN     Pre-Treatment Pain Level -- 5  -LN     Subjective Pain Comment -- with sitting and tylenol= 3/10  -LN        Total Minutes    13555 - PT Therapeutic Exercise Minutes 15  -LN --     47190 -  PT Neuromuscular Reeducation Minutes --  -LN --     53245 - PT Manual Therapy Minutes -- 6  -LN        Exercise 1    Exercise Name 1 LTR  -LN --     Cueing 1 Verbal;Tactile;Demo  -LN --     Reps 1 5  -LN --     Time 1 5 sec  -LN --        Exercise 2    Exercise Name 2 DKC  -LN --     Cueing 2 Verbal;Tactile;Demo  -LN --     Reps 2 5  -LN --     Time 2 10 sec  -LN --        Exercise 3    Exercise Name 3 supine HS stretch with sheet  -LN --     Cueing 3 Verbal;Tactile;Demo  -LN --     Reps 3 5  -LN --     Time 3 10 sec  -LN --        Exercise 4    Exercise Name 4 supine HS stretch with sheet with adduction (ITB)  -LN --     Cueing 4 Verbal;Tactile;Demo  -LN --     Reps 4 5  -LN --     Time 4 10 sec  -LN --        Exercise 5    Exercise Name 5 supine modified piriformis stretch  -LN --     Cueing 5 Verbal;Tactile;Demo  -LN --     Reps 5 3  -LN --     Time 5 20 sec  -LN --        Exercise 6    Exercise Name 6 seated Ntihya stretch/piriformis stretch seated  -LN --     Cueing 6 Verbal;Tactile;Demo  -LN --     " Reps 6 3  -LN --     Time 6 20 sec  -LN --        Exercise 7    Exercise Name 7 PPT  -LN --     Cueing 7 Verbal;Tactile;Demo  -LN --     Reps 7 10  -LN --     Time 7 5 sec  -LN --        Exercise 8    Exercise Name 8 PPT with hooklying ball squeeze  -LN --     Cueing 8 Verbal;Tactile;Demo  -LN --     Reps 8 10  -LN --     Time 8 5 sec  -LN --        Exercise 9    Exercise Name 9 PPT with supine clam shells vs TB  -LN --     Cueing 9 Verbal;Tactile;Demo  -LN --     Reps 9 10  -LN --     Time 9 5 sec  -LN --     Additional Comments silver TB  -LN --        Exercise 10    Exercise Name 10 Supine sciatic nerve glide  -LN --     Cueing 10 Verbal;Tactile;Demo  -LN --     Reps 10 3-5 reps  -LN --     Time 10 with 10 AP  -LN --        Exercise 11    Exercise Name 11 Bridge with ball  -LN --     Cueing 11 Verbal;Tactile;Demo  -LN --     Reps 11 10  -LN --     Time 11 5 sec  -LN --        Exercise 12    Exercise Name 12 Prayer stretch  -LN --     Cueing 12 Verbal;Tactile;Demo  -LN --     Additional Comments HEP- 5 x 10 sec to do some forward and some to R side.  -LN --               User Key  (r) = Recorded By, (t) = Taken By, (c) = Cosigned By      Initials Name Provider Type    Jael Archuleta, PT Physical Therapist                             Manual Rx (Last 36 Hours)       Manual Treatments       Row Name 06/13/24 1300             Total Minutes    78221 - PT Manual Therapy Minutes 6  -LN         Manual Rx 1    Manual Rx 1 Location Pelvis  -LN      Manual Rx 1 Type MET- shotgun/R anterior innominate  also done on 6/6/24.  -LN      Manual Rx 1 Duration Good pelvic alignment noted after MET.  -LN                User Key  (r) = Recorded By, (t) = Taken By, (c) = Cosigned By      Initials Name Provider Type    Jael Archuleta, PT Physical Therapist                                       Time Calculation:   Start Time: 1355  Stop Time: 1450  Time Calculation (min): 55 min  Timed Charges  10393 - PT  Therapeutic Exercise Minutes: 15  56754 - PT Manual Therapy Minutes: 6  Untimed Charges  PT E-Stim Unattended Minutes: 15  PT Moist Heat Minutes:  (15 minutes)  Total Minutes  Timed Charges Total Minutes: 15  Untimed Charges Total Minutes: 15   Total Minutes: 15  Therapy Charges for Today       Code Description Service Date Service Provider Modifiers Qty    22414765218  PT THER PROC EA 15 MIN 6/13/2024 Jael Leyva, PT GP 1    [id number]  PT ELECTRICAL STIM UNATTENDED 6/13/2024 Jael Leyva, PT  1                      Jael Leyva, PT  6/13/2024

## 2024-06-14 RX ORDER — APIXABAN 5 MG/1
5 TABLET, FILM COATED ORAL EVERY 12 HOURS
Qty: 60 TABLET | Refills: 11 | Status: SHIPPED | OUTPATIENT
Start: 2024-06-14

## 2024-06-20 ENCOUNTER — HOSPITAL ENCOUNTER (OUTPATIENT)
Dept: PHYSICAL THERAPY | Facility: HOSPITAL | Age: 84
Setting detail: THERAPIES SERIES
Discharge: HOME | End: 2024-06-20
Payer: MEDICARE

## 2024-06-20 DIAGNOSIS — M54.42 ACUTE BILATERAL LOW BACK PAIN WITH LEFT-SIDED SCIATICA: Primary | ICD-10-CM

## 2024-06-20 DIAGNOSIS — M54.32 SCIATICA OF LEFT SIDE: ICD-10-CM

## 2024-06-20 PROCEDURE — 97110 THERAPEUTIC EXERCISES: CPT

## 2024-06-20 PROCEDURE — G0283 ELEC STIM OTHER THAN WOUND: HCPCS

## 2024-06-20 NOTE — THERAPY TREATMENT NOTE
Outpatient Physical Therapy Ortho Treatment Note   Maryellen Hopper     Patient Name: Berta Garay  : 1940  MRN: [id number]  Today's Date: 2024      Visit Date: 2024    Visit Dx:    ICD-10-CM ICD-9-CM   1. Acute bilateral low back pain with left-sided sciatica  M54.42 724.2     724.3   2. Sciatica of left side  M54.32 724.3       Patient Active Problem List   Diagnosis    Screening for colon cancer    Encounter for screening for malignant neoplasm of colon    Personal history of colonic polyps    Atrial fibrillation with RVR    Benign essential HTN    Hyperlipidemia LDL goal <100    H/O cardiac radiofrequency ablation--PVI 2023, redo 3/2023    Atrial fibrillation, persistent        Past Medical History:   Diagnosis Date    Atrial fibrillation     Colon polyp     GERD (gastroesophageal reflux disease)     Hyperlipidemia     Hypertension         Past Surgical History:   Procedure Laterality Date    CARDIAC ELECTROPHYSIOLOGY PROCEDURE N/A 1/10/2023    Procedure: Ablation atrial fibrillation;  Surgeon: Wilder Zamudio MD;  Location:  [facility];  Service: Cardiovascular;  Laterality: N/A;    CARDIAC ELECTROPHYSIOLOGY PROCEDURE N/A 3/30/2023    Procedure: Ablation atrial fibrillation--redo;  Surgeon: Wilder Zamudio MD;  Location:  [facility];  Service: Cardiovascular;  Laterality: N/A;    COLONOSCOPY      COLONOSCOPY N/A 2018    Procedure: COLONOSCOPY w/ polypectomy;  Surgeon: Gilberto Stringer MD;  Location: [facility] OR;  Service: Gastroenterology    COLONOSCOPY W/ POLYPECTOMY N/A 11/15/2021    Procedure: COLONOSCOPY; POLYPECTOMY;  Surgeon: Gilberto Stringer MD;  Location: [facility] OR;  Service: Gastroenterology;  Laterality: N/A;  POLYP  DIVERTICULOSIS    ENDOSCOPY      EYE SURGERY      Bilateral Cataracts        PT Ortho       Row Name 24 1300       Subjective    Subjective Comments Pt reports that she is better than she was but she  "still feels it in her L buttocks sharon with movements like gettiing in and out of car. Better with going down steps and walking; but going up steps still bothers her and she feels like she needs to still do 1 step at a time. \"I have been faithful with my exercises at least 1 x day.\"  -LN       Precautions and Contraindications    Precautions/Limitations no known precautions/limitations  -LN       Subjective Pain    Able to rate subjective pain? yes  -LN    Pre-Treatment Pain Level 2  -LN    Subjective Pain Comment getting out of car= 7-8/10  -LN              User Key  (r) = Recorded By, (t) = Taken By, (c) = Cosigned By      Initials Name Provider Type    Jael Archuleta, PT Physical Therapist                                 PT Assessment/Plan       Row Name 06/20/24 1400          PT Assessment    Assessment Comments Pt with overall decreased pain level but pain in L SI/LS/buttocks area persists sharon with transitional movements like getting in and out of car and also with going up steps. She still has to do non-reciprocal steps with going up steps, does well with going down steps. Good pelvic alignment noted today so no MET needed. She continues to do well with HEP.  -LN        PT Plan    PT Frequency 1x/week  -LN     PT Plan Comments Continue 1 x week; progress exercises as tolerated. Modalities PRN; assess benefit of IFC & MH. MET PRN. Consider addition of US to L LS/SI/buttocks area.  -LN               User Key  (r) = Recorded By, (t) = Taken By, (c) = Cosigned By      Initials Name Provider Type    Jael Archuleta, PT Physical Therapist                     Modalities       Row Name 06/20/24 1400             Moist Heat    MH Applied Yes  -LN      Location LB/LS/buttocks area & L hip with IFC with pt supine in hooklying.  -LN      PT Moist Heat Minutes --  15 minutes  -LN      MH S/P Rx Yes  -LN         ELECTRICAL STIMULATION    Attended/Unattended Unattended  -LN      Stimulation Type IFC  -LN  " "    Location/Electrode Placement/Other L LS area/piriformis/hip with MH.  -LN      PT E-Stim Unattended Minutes 15  -LN                User Key  (r) = Recorded By, (t) = Taken By, (c) = Cosigned By      Initials Name Provider Type    Jael Archuleta, PT Physical Therapist                   OP Exercises       Row Name 06/20/24 1400 06/20/24 1300          Precautions    Existing Precautions/Restrictions no known precautions/restrictions  -LN --        Subjective    Subjective Comments -- Pt reports that she is better than she was but she still feels it in her L buttocks sharon with movements like gettiing in and out of car. Better with going down steps and walking; but going up steps still bothers her and she feels like she needs to still do 1 step at a time. \"I have been faithful with my exercises at least 1 x day.\"  -LN        Subjective Pain    Able to rate subjective pain? -- yes  -LN     Pre-Treatment Pain Level -- 2  -LN     Subjective Pain Comment -- getting out of car= 7-8/10  -LN        Total Minutes    19722 - PT Therapeutic Exercise Minutes 25  -LN --     41642 - PT Manual Therapy Minutes -- 4  -LN        Exercise 1    Exercise Name 1 LTR  -LN --     Cueing 1 Verbal;Tactile;Demo  -LN --     Reps 1 5  -LN --     Time 1 5 sec  -LN --        Exercise 2    Exercise Name 2 DKC  -LN --     Cueing 2 Verbal;Tactile;Demo  -LN --     Reps 2 5  -LN --     Time 2 10 sec  -LN --        Exercise 3    Exercise Name 3 supine HS stretch with sheet  -LN --     Cueing 3 Verbal;Tactile;Demo  -LN --     Reps 3 5  -LN --     Time 3 10 sec  -LN --        Exercise 4    Exercise Name 4 supine HS stretch with sheet with adduction (ITB)  -LN --     Cueing 4 Verbal;Tactile;Demo  -LN --     Reps 4 5  -LN --     Time 4 10 sec  -LN --        Exercise 5    Exercise Name 5 supine modified piriformis stretch  -LN --     Cueing 5 Verbal;Tactile;Demo  -LN --     Reps 5 3  -LN --     Time 5 20 sec  -LN --        Exercise 6    Exercise " Name 6 seated Nithya stretch/piriformis stretch seated  -LN --     Cueing 6 Verbal;Tactile;Demo  -LN --     Reps 6 3  -LN --     Time 6 20 sec  -LN --     Additional Comments to do at home  -LN --        Exercise 7    Exercise Name 7 PPT  -LN --     Cueing 7 Verbal;Tactile;Demo  -LN --     Reps 7 10  -LN --     Time 7 5 sec  -LN --        Exercise 8    Exercise Name 8 PPT with hooklying ball squeeze  -LN --     Cueing 8 Verbal;Tactile;Demo  -LN --     Reps 8 10  -LN --     Time 8 5 sec  -LN --        Exercise 9    Exercise Name 9 PPT with supine clam shells vs TB  -LN --     Cueing 9 Verbal;Tactile;Demo  -LN --     Reps 9 10  -LN --     Time 9 5 sec  -LN --     Additional Comments silver TB  -LN --        Exercise 10    Exercise Name 10 Supine sciatic nerve glide  -LN --     Cueing 10 Verbal;Tactile;Demo  -LN --     Reps 10 3-5 reps  -LN --     Time 10 with 10 AP  -LN --        Exercise 11    Exercise Name 11 Bridge with ball  -LN --     Cueing 11 Verbal;Tactile;Demo  -LN --     Reps 11 10  -LN --     Time 11 5 sec  -LN --        Exercise 12    Exercise Name 12 Prayer stretch  -LN --     Cueing 12 Verbal;Tactile;Demo  -LN --     Additional Comments HEP- 5 x 10 sec to do some forward and some to R side.  -LN --        Exercise 13    Exercise Name 13 half cobra prone extension  -LN --     Cueing 13 Verbal;Tactile;Demo  -LN --     Reps 13 10  -LN --     Time 13 5-10 sec  -LN --               User Key  (r) = Recorded By, (t) = Taken By, (c) = Cosigned By      Initials Name Provider Type    LN Jael Leyva, PT Physical Therapist                             Manual Rx (Last 36 Hours)       Manual Treatments       Row Name 06/20/24 1300             Total Minutes    37802 - PT Manual Therapy Minutes 4  -LN         Manual Rx 1    Manual Rx 1 Location Pelvis  -LN      Manual Rx 1 Type MET- shotgun only needed  -LN      Manual Rx 1 Duration Good pelvic alignment noted today so only shotgun needed.  -LN                 User Key  (r) = Recorded By, (t) = Taken By, (c) = Cosigned By      Initials Name Provider Type    Jael Archuleta, PT Physical Therapist                        Therapy Education  Education Details: Pt to add half cobra prone extension to HEP as tolerated and continue to use MH/CP PRN. Pt to also try using Biofreeze patches on area of pain for pain relief.  Given: HEP, Symptoms/condition management, Pain management, Posture/body mechanics  Program: New, Reinforced (added half cobra prone extension)  How Provided: Verbal, Demonstration, Written  Provided to: Patient  Level of Understanding: Teach back education performed, Verbalized, Demonstrated              Time Calculation:   Start Time: 1400  Stop Time: 1458  Time Calculation (min): 58 min  Timed Charges  81155 - PT Therapeutic Exercise Minutes: 25  65729 - PT Manual Therapy Minutes: 4  Untimed Charges  PT E-Stim Unattended Minutes: 15  PT Moist Heat Minutes:  (15 minutes)  Total Minutes  Timed Charges Total Minutes: 25  Untimed Charges Total Minutes: 15   Total Minutes: 40  Therapy Charges for Today       Code Description Service Date Service Provider Modifiers Qty    73498944884 HC PT ELECTRICAL STIM UNATTENDED 6/20/2024 Jael Leyva, PT  1    00932545062 HC PT THER PROC EA 15 MIN 6/20/2024 Jael Leyva, PT GP 2                      Jael Leyva, PT  6/20/2024

## 2024-06-24 RX ORDER — DILTIAZEM HYDROCHLORIDE 360 MG/1
CAPSULE, EXTENDED RELEASE ORAL
Qty: 90 CAPSULE | Refills: 1 | Status: SHIPPED | OUTPATIENT
Start: 2024-06-24

## 2024-06-27 ENCOUNTER — HOSPITAL ENCOUNTER (OUTPATIENT)
Dept: PHYSICAL THERAPY | Facility: HOSPITAL | Age: 84
Setting detail: THERAPIES SERIES
Discharge: HOME | End: 2024-06-27
Payer: MEDICARE

## 2024-06-27 DIAGNOSIS — M54.32 SCIATICA OF LEFT SIDE: ICD-10-CM

## 2024-06-27 DIAGNOSIS — M54.42 ACUTE BILATERAL LOW BACK PAIN WITH LEFT-SIDED SCIATICA: Primary | ICD-10-CM

## 2024-06-27 PROCEDURE — G0283 ELEC STIM OTHER THAN WOUND: HCPCS

## 2024-06-27 PROCEDURE — 97110 THERAPEUTIC EXERCISES: CPT

## 2024-06-27 NOTE — THERAPY TREATMENT NOTE
"  Outpatient Physical Therapy Ortho Treatment Note   Maryellen Hopper     Patient Name: Berta Garay  : 1940  MRN: [id number]  Today's Date: 2024        Visit Date: 2024    Visit Dx:    ICD-10-CM ICD-9-CM   1. Acute bilateral low back pain with left-sided sciatica  M54.42 724.2     724.3   2. Sciatica of left side  M54.32 724.3       Patient Active Problem List   Diagnosis    Screening for colon cancer    Encounter for screening for malignant neoplasm of colon    Personal history of colonic polyps    Atrial fibrillation with RVR    Benign essential HTN    Hyperlipidemia LDL goal <100    H/O cardiac radiofrequency ablation--PVI 2023, redo 3/2023    Atrial fibrillation, persistent        Past Medical History:   Diagnosis Date    Atrial fibrillation     Colon polyp     GERD (gastroesophageal reflux disease)     Hyperlipidemia     Hypertension         Past Surgical History:   Procedure Laterality Date    CARDIAC ELECTROPHYSIOLOGY PROCEDURE N/A 1/10/2023    Procedure: Ablation atrial fibrillation;  Surgeon: Wilder Zamudio MD;  Location:  [facility];  Service: Cardiovascular;  Laterality: N/A;    CARDIAC ELECTROPHYSIOLOGY PROCEDURE N/A 3/30/2023    Procedure: Ablation atrial fibrillation--redo;  Surgeon: Wilder Zamudio MD;  Location:  [facility];  Service: Cardiovascular;  Laterality: N/A;    COLONOSCOPY      COLONOSCOPY N/A 2018    Procedure: COLONOSCOPY w/ polypectomy;  Surgeon: Gilberto Stringer MD;  Location: [facility] OR;  Service: Gastroenterology    COLONOSCOPY W/ POLYPECTOMY N/A 11/15/2021    Procedure: COLONOSCOPY; POLYPECTOMY;  Surgeon: Gilberto Stringer MD;  Location: [facility] OR;  Service: Gastroenterology;  Laterality: N/A;  POLYP  DIVERTICULOSIS    ENDOSCOPY      EYE SURGERY      Bilateral Cataracts        PT Ortho       Row Name 24 1400       Subjective    Subjective Comments \"It is better some but not as much as I would " "like it to be.\" \"The Biofreeze feels excellent and the electrodes do feel good.\"  \"I am doing good with the exercises.\" She continues to c/o soreness and pain in L SI joint area. \"I don't think it is my hip.\"  -LN       Precautions and Contraindications    Precautions/Limitations no known precautions/limitations  -LN       Subjective Pain    Able to rate subjective pain? yes  -LN    Pre-Treatment Pain Level 2  in L SI joint  -LN    Subjective Pain Comment getting out of car= 6/10  -LN              User Key  (r) = Recorded By, (t) = Taken By, (c) = Cosigned By      Initials Name Provider Type    Jael Archuleta, PT Physical Therapist                                 PT Assessment/Plan       Row Name 06/27/24 1400          PT Assessment    Assessment Comments Pt with overall decreased pain but pain and soreness persists in L SI joint area and still with sx of L sided sciatica. She is doing well with HEP. Pt found to have a sacral obliquity and it corrected well with MET. Good pelvic alignment maintained.  -LN        PT Plan    PT Frequency 1x/week  -LN     PT Plan Comments Continue 1 x week; progress exercises as tolerated. Modalities PRN; assess benefit of IFC & MH. MET PRN. Consider addition of US to L LS/SI/buttocks area. Re-check sacral alignment.  -LN               User Key  (r) = Recorded By, (t) = Taken By, (c) = Cosigned By      Initials Name Provider Type    Jael Archuleta, PT Physical Therapist                     Modalities       Row Name 06/27/24 1400             Moist Heat    MH Applied Yes  -LN      Location LB/LS/buttocks area & L hip with IFC with pt supine in hooklying.  -LN      PT Moist Heat Minutes --  15 minutes  -LN      MH S/P Rx Yes  -LN         ELECTRICAL STIMULATION    Attended/Unattended Unattended  -LN      Stimulation Type IFC  -LN      Location/Electrode Placement/Other L LS area/pirifomis/hip with MH.  -LN      PT E-Stim Unattended Minutes 15  -LN                " "User Key  (r) = Recorded By, (t) = Taken By, (c) = Cosigned By      Initials Name Provider Type    LN Jael Leyva, PT Physical Therapist                   OP Exercises       Row Name 06/27/24 1400 06/27/24 1300          Precautions    Existing Precautions/Restrictions no known precautions/restrictions  -LN --        Subjective    Subjective Comments \"It is better some but not as much as I would like it to be.\" \"The Biofreeze feels excellent and the electrodes do feel good.\"  \"I am doing good with the exercises.\" She continues to c/o soreness and pain in L SI joint area. \"I don't think it is my hip.\"  -LN --        Subjective Pain    Able to rate subjective pain? yes  -LN --     Pre-Treatment Pain Level 2  in L SI joint  -LN --     Subjective Pain Comment getting out of car= 6/10  -LN --        Total Minutes    17609 - PT Therapeutic Exercise Minutes 10  -LN --     96322 - PT Manual Therapy Minutes -- 10  -LN        Exercise 1    Exercise Name 1 verbally reviewed HEP  -LN --     Cueing 1 --  -LN --     Reps 1 --  -LN --     Time 1 --  -LN --        Exercise 2    Exercise Name 2 --  -LN --     Cueing 2 --  -LN --     Reps 2 --  -LN --     Time 2 --  -LN --        Exercise 3    Exercise Name 3 --  -LN --     Cueing 3 --  -LN --     Reps 3 --  -LN --     Time 3 --  -LN --        Exercise 4    Exercise Name 4 --  -LN --     Cueing 4 --  -LN --     Reps 4 --  -LN --     Time 4 --  -LN --        Exercise 5    Exercise Name 5 --  -LN --     Cueing 5 --  -LN --     Reps 5 --  -LN --     Time 5 --  -LN --        Exercise 6    Exercise Name 6 --  -LN --     Cueing 6 --  -LN --     Reps 6 --  -LN --     Time 6 --  -LN --        Exercise 7    Exercise Name 7 --  -LN --     Cueing 7 --  -LN --     Reps 7 --  -LN --     Time 7 --  -LN --        Exercise 8    Exercise Name 8 --  -LN --     Cueing 8 --  -LN --     Reps 8 --  -LN --     Time 8 --  -LN --        Exercise 9    Exercise Name 9 --  -LN --     Cueing 9 --  -LN " --     Reps 9 --  -LN --     Time 9 --  -LN --        Exercise 10    Exercise Name 10 --  -LN --     Cueing 10 --  -LN --     Reps 10 --  -LN --     Time 10 --  -LN --        Exercise 11    Exercise Name 11 --  -LN --     Cueing 11 --  -LN --     Reps 11 --  -LN --     Time 11 --  -LN --        Exercise 12    Exercise Name 12 Prone leg raise  -LN --     Cueing 12 Verbal;Tactile;Demo  -LN --     Time 12 10 each leg  -LN --     Additional Comments --  -LN --        Exercise 13    Exercise Name 13 prone pressup on L  -LN --     Cueing 13 Verbal;Tactile;Demo  -LN --     Reps 13 10  -LN --     Time 13 5-10 sec  -LN --               User Key  (r) = Recorded By, (t) = Taken By, (c) = Cosigned By      Initials Name Provider Type    Jael Archuleta, PT Physical Therapist                             Manual Rx (Last 36 Hours)       Manual Treatments       Row Name 06/27/24 1300             Total Minutes    50080 - PT Manual Therapy Minutes 10  -LN         Manual Rx 1    Manual Rx 1 Location Pelvis  -LN      Manual Rx 1 Type MET- shotgun; FRSr coupled with EMERSON posterior sacral torsion  -LN      Manual Rx 1 Duration Good pelvic alignment noted today so only shotgun needed; improved sacral alignment noted after MET.  -LN                User Key  (r) = Recorded By, (t) = Taken By, (c) = Cosigned By      Initials Name Provider Type    Jael Archuleta, PT Physical Therapist                        Therapy Education  Education Details: Pt to add prone leg raises and unilateral pressup on L to HEP as tolerated. Pt to use MH/CP PRN. Pt to continue to use Biofreeze as needed.  Given: HEP, Symptoms/condition management, Pain management, Posture/body mechanics  Program: New, Reinforced (added prone leg raises and unilateral pressup on L)  How Provided: Verbal, Demonstration, Written  Provided to: Patient  Level of Understanding: Teach back education performed, Verbalized, Demonstrated              Time Calculation:    Start Time: 1400  Stop Time: 1455  Time Calculation (min): 55 min  Timed Charges  44489 - PT Therapeutic Exercise Minutes: 10  89363 - PT Manual Therapy Minutes: 10  Untimed Charges  PT E-Stim Unattended Minutes: 15  PT Moist Heat Minutes:  (15 minutes)  Total Minutes  Timed Charges Total Minutes: 10  Untimed Charges Total Minutes: 15   Total Minutes: 25  Therapy Charges for Today       Code Description Service Date Service Provider Modifiers Qty    16538716812 HC PT THER PROC EA 15 MIN 6/27/2024 Jael Leyva, PT GP 1    82853063931 HC PT ELECTRICAL STIM UNATTENDED 6/27/2024 Jael Leyva, PT  1                      Jael Leyva, PT  6/27/2024

## 2024-07-03 ENCOUNTER — HOSPITAL ENCOUNTER (OUTPATIENT)
Dept: PHYSICAL THERAPY | Facility: HOSPITAL | Age: 84
Setting detail: THERAPIES SERIES
Discharge: HOME | End: 2024-07-03
Payer: MEDICARE

## 2024-07-03 DIAGNOSIS — M54.42 ACUTE BILATERAL LOW BACK PAIN WITH LEFT-SIDED SCIATICA: Primary | ICD-10-CM

## 2024-07-03 DIAGNOSIS — M54.32 SCIATICA OF LEFT SIDE: ICD-10-CM

## 2024-07-03 PROCEDURE — 97035 APP MDLTY 1+ULTRASOUND EA 15: CPT

## 2024-07-03 PROCEDURE — 97140 MANUAL THERAPY 1/> REGIONS: CPT

## 2024-07-03 PROCEDURE — G0283 ELEC STIM OTHER THAN WOUND: HCPCS

## 2024-07-03 NOTE — THERAPY TREATMENT NOTE
"  Outpatient Physical Therapy Ortho Treatment Note   Maryellen Hopper     Patient Name: Berta Gaary  : 1940  MRN: [id number]  Today's Date: 7/3/2024      Visit Date: 2024      Visit Dx:    ICD-10-CM ICD-9-CM   1. Acute bilateral low back pain with left-sided sciatica  M54.42 724.2     724.3   2. Sciatica of left side  M54.32 724.3       Patient Active Problem List   Diagnosis    Screening for colon cancer    Encounter for screening for malignant neoplasm of colon    Personal history of colonic polyps    Atrial fibrillation with RVR    Benign essential HTN    Hyperlipidemia LDL goal <100    H/O cardiac radiofrequency ablation--PVI 2023, redo 3/2023    Atrial fibrillation, persistent        Past Medical History:   Diagnosis Date    Atrial fibrillation     Colon polyp     GERD (gastroesophageal reflux disease)     Hyperlipidemia     Hypertension         Past Surgical History:   Procedure Laterality Date    CARDIAC ELECTROPHYSIOLOGY PROCEDURE N/A 1/10/2023    Procedure: Ablation atrial fibrillation;  Surgeon: Wilder Zamudio MD;  Location:  [facility];  Service: Cardiovascular;  Laterality: N/A;    CARDIAC ELECTROPHYSIOLOGY PROCEDURE N/A 3/30/2023    Procedure: Ablation atrial fibrillation--redo;  Surgeon: Wilder Zamudio MD;  Location:  [facility];  Service: Cardiovascular;  Laterality: N/A;    COLONOSCOPY      COLONOSCOPY N/A 2018    Procedure: COLONOSCOPY w/ polypectomy;  Surgeon: Gilberto Stringer MD;  Location: [facility] OR;  Service: Gastroenterology    COLONOSCOPY W/ POLYPECTOMY N/A 11/15/2021    Procedure: COLONOSCOPY; POLYPECTOMY;  Surgeon: Gilberto Stringer MD;  Location: [facility] OR;  Service: Gastroenterology;  Laterality: N/A;  POLYP  DIVERTICULOSIS    ENDOSCOPY      EYE SURGERY      Bilateral Cataracts        PT Ortho       Row Name 24 1400       Subjective    Subjective Comments \"I think that new exercise is doing good, so I " "have been doing that.\" \"I do feel better after doing that.\"- pt talking about unilateral pressup on L.  Pt is still having good days and bad days. \"Yesterday was good but the day before not so much.\" Pain is only L LS area per pt. \"I do all my exercises.\"  -LN       Precautions and Contraindications    Precautions/Limitations no known precautions/limitations  -LN       Subjective Pain    Able to rate subjective pain? yes  -LN    Pre-Treatment Pain Level 4  -LN    Subjective Pain Comment getting out of car= 4/10  -LN              User Key  (r) = Recorded By, (t) = Taken By, (c) = Cosigned By      Initials Name Provider Type    Jael Archuleta, PT Physical Therapist                                 PT Assessment/Plan       Row Name 07/03/24 1400          PT Assessment    Assessment Comments Pt continues with overall decreased pain level but pain and tenderness persists in L LS/SI area; trial of US done today. Overall improved pelvic and sacral alignment noted today but did have a L pelvic outlfare noted and did have a sacral rotation but much less than last visit. She is doing very well wih HEP. Decreased pain with getting out of car reported today.  -LN        PT Plan    PT Frequency Other (comment)  -LN     Predicted Duration of Therapy Intervention (PT) decrease frequency to 1 x every other week as tolerated.  -LN     PT Plan Comments Continue per POC; decrease frequency to 1 x every other week as tolerated (pt to call if she needs to be seen sooner); progress exercises as tolerated. Modalities PRN; assess benefit of US. MET PRN.  -LN               User Key  (r) = Recorded By, (t) = Taken By, (c) = Cosigned By      Initials Name Provider Type    Jael Archuleta, PT Physical Therapist                     Modalities       Row Name 07/03/24 1400             Precautions    Existing Precautions/Restrictions no known precautions/restrictions  -LN         Moist Heat    MH Applied Yes  -LN      " "Location LB/LS/buttocks area & L hip with IFC with pt supine in hooklying.  -LN      PT Moist Heat Minutes --  15 minutes  -LN      MH S/P Rx Yes  -LN         Ultrasound 28344    Location L LS/LB/buttocks area with pt prone.  -LN      Duty Cycle 100  -LN      Frequency 1.0 MHz  -LN      Intensity - Wts/cm 1.5  -LN      92511 - PT Ultrasound Minutes 8  -LN         ELECTRICAL STIMULATION    Attended/Unattended Unattended  -LN      Stimulation Type IFC  -LN      Location/Electrode Placement/Other L LS area/pirifomis/hip with MH.  -LN      PT E-Stim Unattended Minutes 15  -LN                User Key  (r) = Recorded By, (t) = Taken By, (c) = Cosigned By      Initials Name Provider Type    Jael Archuleta, PT Physical Therapist                   OP Exercises       Row Name 07/03/24 1400 07/03/24 1300          Precautions    Existing Precautions/Restrictions no known precautions/restrictions  -LN --        Subjective    Subjective Comments \"I think that new exercise is doing good, so I have been doing that.\" \"I do feel better after doing that.\"- pt talking about unilateral pressup on L.  Pt is still having good days and bad days. \"Yesterday was good but the day before not so much.\" Pain is only L LS area per pt. \"I do all my exercises.\"  -LN --        Subjective Pain    Able to rate subjective pain? yes  -LN --     Pre-Treatment Pain Level 4  -LN --     Subjective Pain Comment getting out of car= 4/10  -LN --        Total Minutes    91553 - PT Therapeutic Exercise Minutes 5  -LN --     36198 - PT Manual Therapy Minutes -- 10  -LN        Exercise 1    Exercise Name 1 verbally reviewed HEP  -LN --        Exercise 12    Exercise Name 12 Prone leg raise  -LN --     Cueing 12 Verbal;Tactile;Demo  -LN --     Time 12 10 each leg  -LN --        Exercise 13    Exercise Name 13 prone pressup on L  -LN --     Cueing 13 Verbal;Tactile;Demo  -LN --     Reps 13 --  -LN --     Time 13 --  -LN --     Additional Comments HEP  " -LN --               User Key  (r) = Recorded By, (t) = Taken By, (c) = Cosigned By      Initials Name Provider Type    Jael Archuleta, PT Physical Therapist                             Manual Rx (Last 36 Hours)       Manual Treatments       Row Name 07/03/24 1300             Total Minutes    90047 - PT Manual Therapy Minutes 10  -LN         Manual Rx 1    Manual Rx 1 Location Pelvis  -LN      Manual Rx 1 Type MET- shotgun; L pelvic outflare;  FRSr coupled with EMERSON posterior sacral torsion  -LN      Manual Rx 1 Duration Good pelvic alignment noted today; only L pelvic outflare  noted; improved sacral alignment noted after MET.  -LN                User Key  (r) = Recorded By, (t) = Taken By, (c) = Cosigned By      Initials Name Provider Type    Jael Archuleta, PT Physical Therapist                        Therapy Education  Given: HEP, Symptoms/condition management, Pain management, Posture/body mechanics  Program: Reinforced  How Provided: Verbal, Demonstration  Provided to: Patient  Level of Understanding: Teach back education performed, Verbalized, Demonstrated              Time Calculation:   Start Time: 1403  Stop Time: 1505  Time Calculation (min): 62 min  Timed Charges  03630 - PT Ultrasound Minutes: 8  42052 - PT Therapeutic Exercise Minutes: 5  26018 - PT Manual Therapy Minutes: 10  Untimed Charges  PT E-Stim Unattended Minutes: 15  PT Moist Heat Minutes:  (15 minutes)  Total Minutes  Timed Charges Total Minutes: 13  Untimed Charges Total Minutes: 15   Total Minutes: 28  Therapy Charges for Today       Code Description Service Date Service Provider Modifiers Qty    51876639443 HC PT MANUAL THERAPY EA 15 MIN 7/3/2024 Jael Leyva, PT GP 1    38677242110 HC PT ULTRASOUND EA 15 MIN 7/3/2024 Jael Leyva, PT GP 1    28470216792 HC PT ELECTRICAL STIM UNATTENDED 7/3/2024 Jael Leyva, PT  1                      Jael Leyva, PT  7/3/2024

## 2024-07-17 ENCOUNTER — HOSPITAL ENCOUNTER (OUTPATIENT)
Dept: PHYSICAL THERAPY | Facility: HOSPITAL | Age: 84
Setting detail: THERAPIES SERIES
Discharge: HOME | End: 2024-07-17
Payer: MEDICARE

## 2024-07-17 DIAGNOSIS — M54.42 ACUTE BILATERAL LOW BACK PAIN WITH LEFT-SIDED SCIATICA: Primary | ICD-10-CM

## 2024-07-17 DIAGNOSIS — M54.32 SCIATICA OF LEFT SIDE: ICD-10-CM

## 2024-07-17 PROCEDURE — 97140 MANUAL THERAPY 1/> REGIONS: CPT

## 2024-07-17 PROCEDURE — G0283 ELEC STIM OTHER THAN WOUND: HCPCS

## 2024-07-17 PROCEDURE — 97035 APP MDLTY 1+ULTRASOUND EA 15: CPT

## 2024-07-17 NOTE — THERAPY TREATMENT NOTE
"  Outpatient Physical Therapy Ortho Treatment Note   Maryellen Hopper     Patient Name: Berta Garay  : 1940  MRN: [id number]  Today's Date: 2024        Visit Date: 2024    Visit Dx:    ICD-10-CM ICD-9-CM   1. Acute bilateral low back pain with left-sided sciatica  M54.42 724.2     724.3   2. Sciatica of left side  M54.32 724.3       Patient Active Problem List   Diagnosis    Screening for colon cancer    Encounter for screening for malignant neoplasm of colon    Personal history of colonic polyps    Atrial fibrillation with RVR    Benign essential HTN    Hyperlipidemia LDL goal <100    H/O cardiac radiofrequency ablation--PVI 2023, redo 3/2023    Atrial fibrillation, persistent        Past Medical History:   Diagnosis Date    Atrial fibrillation     Colon polyp     GERD (gastroesophageal reflux disease)     Hyperlipidemia     Hypertension         Past Surgical History:   Procedure Laterality Date    CARDIAC ELECTROPHYSIOLOGY PROCEDURE N/A 1/10/2023    Procedure: Ablation atrial fibrillation;  Surgeon: Wilder Zamudio MD;  Location:  [facility];  Service: Cardiovascular;  Laterality: N/A;    CARDIAC ELECTROPHYSIOLOGY PROCEDURE N/A 3/30/2023    Procedure: Ablation atrial fibrillation--redo;  Surgeon: Wilder Zamudio MD;  Location:  [facility];  Service: Cardiovascular;  Laterality: N/A;    COLONOSCOPY      COLONOSCOPY N/A 2018    Procedure: COLONOSCOPY w/ polypectomy;  Surgeon: Gilberto Stringer MD;  Location: [facility] OR;  Service: Gastroenterology    COLONOSCOPY W/ POLYPECTOMY N/A 11/15/2021    Procedure: COLONOSCOPY; POLYPECTOMY;  Surgeon: Gilberto Stringer MD;  Location: [facility] OR;  Service: Gastroenterology;  Laterality: N/A;  POLYP  DIVERTICULOSIS    ENDOSCOPY      EYE SURGERY      Bilateral Cataracts        PT Ortho       Row Name 24 1400       Subjective    Subjective Comments \"I am definitely better but if I sit too long; I " "feel it and after I do my exercises, I have sore across my low back.\" \"I can walk better but I still have pain on the L side of my back if I walk too far.\" \"I got a massage last week and it helped for about 30 minutes.\" \"I always feel better after I leave here but the relief only lasts about 30 minutes.\"  -LN       Precautions and Contraindications    Precautions/Limitations no known precautions/limitations  -LN       Subjective Pain    Able to rate subjective pain? yes  -LN    Pre-Treatment Pain Level 3  3-4/10  -LN    Subjective Pain Comment getting out of car= 5/10  -LN              User Key  (r) = Recorded By, (t) = Taken By, (c) = Cosigned By      Initials Name Provider Type    Jael Archuleta, PT Physical Therapist                                 PT Assessment/Plan       Row Name 07/17/24 1400          PT Assessment    Assessment Comments Pt reports being better than she was before PT began, but L sided LS/buttocks pain persists sharon with increased walking; getting in and out of car and if she sits too long. Overall improved pelvic and sacral alignment noted but she still needed MET; both corrected easily. She continues to do well with HEP. Pt seems to get benefit from PT but only temporary; feel she would benefit from a trial of dry needling to help further decreased her pain level and sciatica sx. Pt is interested in trying dry needling at her next visit. (gave her written info on DN).  -LN        PT Plan    PT Frequency Other (comment)  -LN     Predicted Duration of Therapy Intervention (PT) decrease frequency to 1 x every other week as tolerated.  -LN     PT Plan Comments Continue per POC; decrease frequency to 1 x every other week as tolerated (pt to call if she needs to be seen sooner); progress exercises as tolerated. Modalities PRN; assess benefit of US. MET PRN. Begin trial of dry needling next visit as long as pt is still interested in trying it.  -LN               User Key  (r) = Recorded " "By, (t) = Taken By, (c) = Cosigned By      Initials Name Provider Type    Jael Archuleta, PT Physical Therapist                     Modalities       Row Name 07/17/24 1400             Moist Heat    MH Applied Yes  -LN      Location LB/LS/buttocks area & L hip with IFC with pt supine in hooklying.  -LN      PT Moist Heat Minutes --  15 minutes  -LN      MH S/P Rx Yes  -LN         Ultrasound 42751    Location L LS/LB/buttocks area with pt prone.  -LN      Duty Cycle 100  -LN      Frequency 1.0 MHz  -LN      Intensity - Wts/cm 1.5  -LN      62151 - PT Ultrasound Minutes 8  -LN         ELECTRICAL STIMULATION    Attended/Unattended Unattended  -LN      Stimulation Type IFC  -LN      Location/Electrode Placement/Other L LS area/pirifomis/hip with MH.  -LN      PT E-Stim Unattended Minutes 15  -LN                User Key  (r) = Recorded By, (t) = Taken By, (c) = Cosigned By      Initials Name Provider Type    Jael Archuleta, PT Physical Therapist                   OP Exercises       Row Name 07/17/24 1400 07/17/24 1300          Precautions    Existing Precautions/Restrictions no known precautions/restrictions  -LN --        Subjective    Subjective Comments \"I am definitely better but if I sit too long; I feel it and after I do my exercises, I have sore across my low back.\" \"I can walk better but I still have pain on the L side of my back if I walk too far.\" \"I got a massage last week and it helped for about 30 minutes.\" \"I always feel better after I leave here but the relief only lasts about 30 minutes.\"  -LN --        Subjective Pain    Able to rate subjective pain? yes  -LN --     Pre-Treatment Pain Level 3  3-4/10  -LN --     Subjective Pain Comment getting out of car= 5/10  -LN --        Total Minutes    70065 - PT Therapeutic Exercise Minutes 6  -LN --     53300 - PT Manual Therapy Minutes -- 10  -LN        Exercise 1    Exercise Name 1 verbally reviewed HEP  -LN --        Exercise 11    " Exercise Name 11 Prone pressup on table  -LN --     Cueing 11 Verbal;Tactile;Demo  -LN --     Reps 11 5  -LN --     Time 11 5 sec  -LN --        Exercise 12    Exercise Name 12 Prone leg raise  -LN --     Cueing 12 Verbal;Tactile;Demo  -LN --     Time 12 10 each leg  -LN --        Exercise 13    Exercise Name 13 prone pressup on L  -LN --     Cueing 13 Verbal;Tactile;Demo  -LN --     Reps 13 10  -LN --     Time 13 5-10 sec  -LN --     Additional Comments cueing for proper technique  -LN --               User Key  (r) = Recorded By, (t) = Taken By, (c) = Cosigned By      Initials Name Provider Type    Jael Archuleta, PT Physical Therapist                             Manual Rx (Last 36 Hours)       Manual Treatments       Row Name 07/17/24 1300             Total Minutes    60536 - PT Manual Therapy Minutes 10  -LN         Manual Rx 1    Manual Rx 1 Location Pelvis  -LN      Manual Rx 1 Type MET- shotgun; L pelvic outflare; L posterior innominate; FRSr coupled with EMERSON posterior sacral torsion  -LN      Manual Rx 1 Duration Good pelvic alignment noted today; only L pelvic outflare  noted; improved sacral alignment noted after MET.  -LN                User Key  (r) = Recorded By, (t) = Taken By, (c) = Cosigned By      Initials Name Provider Type    Jael Archuleta, PT Physical Therapist                        Therapy Education  Education Details: Pt to continue with HEP as tolerated and use MH/CP PRN. Spoke to patient about what dry needling is and how it may benefit her and pt given written information as well.  Given: HEP, Symptoms/condition management, Pain management, Posture/body mechanics  Program: Reinforced (cueing needed for proper technique of L unilateral pressup)  How Provided: Verbal, Demonstration  Provided to: Patient  Level of Understanding: Teach back education performed, Verbalized, Demonstrated              Time Calculation:   Start Time: 1405  Stop Time: 1505  Time Calculation  (min): 60 min  Timed Charges  35850 - PT Ultrasound Minutes: 8  60472 - PT Therapeutic Exercise Minutes: 6  96355 - PT Manual Therapy Minutes: 10  Untimed Charges  PT E-Stim Unattended Minutes: 15  PT Moist Heat Minutes:  (15 minutes)  Total Minutes  Timed Charges Total Minutes: 14  Untimed Charges Total Minutes: 15   Total Minutes: 29  Therapy Charges for Today       Code Description Service Date Service Provider Modifiers Qty    27639901236 HC PT MANUAL THERAPY EA 15 MIN 7/17/2024 Jael Leyva, PT GP 1    54914679933 HC PT ULTRASOUND EA 15 MIN 7/17/2024 Jael Leyva, PT GP 1    93038572245 HC PT ELECTRICAL STIM UNATTENDED 7/17/2024 Jael Leyva, PT  1                      Jael Leyva, PT  7/17/2024

## 2024-08-02 ENCOUNTER — HOSPITAL ENCOUNTER (OUTPATIENT)
Dept: PHYSICAL THERAPY | Facility: HOSPITAL | Age: 84
Setting detail: THERAPIES SERIES
Discharge: HOME | End: 2024-08-02

## 2024-08-02 ENCOUNTER — HOSPITAL ENCOUNTER (OUTPATIENT)
Dept: PHYSICAL THERAPY | Facility: HOSPITAL | Age: 84
Setting detail: THERAPIES SERIES
Discharge: HOME | End: 2024-08-02
Payer: MEDICARE

## 2024-08-02 DIAGNOSIS — M54.42 ACUTE BILATERAL LOW BACK PAIN WITH LEFT-SIDED SCIATICA: Primary | ICD-10-CM

## 2024-08-02 DIAGNOSIS — M54.32 SCIATICA OF LEFT SIDE: ICD-10-CM

## 2024-08-02 PROCEDURE — G0283 ELEC STIM OTHER THAN WOUND: HCPCS

## 2024-08-02 NOTE — THERAPY TREATMENT NOTE
"  Outpatient Physical Therapy Ortho Treatment Note   Maryellen Hopper     Patient Name: Berta Garay  : 1940  MRN: [id number]  Today's Date: 2024      Visit Date: 2024      Visit Dx:    ICD-10-CM ICD-9-CM   1. Acute bilateral low back pain with left-sided sciatica  M54.42 724.2     724.3   2. Sciatica of left side  M54.32 724.3       Patient Active Problem List   Diagnosis    Screening for colon cancer    Encounter for screening for malignant neoplasm of colon    Personal history of colonic polyps    Atrial fibrillation with RVR    Benign essential HTN    Hyperlipidemia LDL goal <100    H/O cardiac radiofrequency ablation--PVI 2023, redo 3/2023    Atrial fibrillation, persistent        Past Medical History:   Diagnosis Date    Atrial fibrillation     Colon polyp     GERD (gastroesophageal reflux disease)     Hyperlipidemia     Hypertension         Past Surgical History:   Procedure Laterality Date    CARDIAC ELECTROPHYSIOLOGY PROCEDURE N/A 1/10/2023    Procedure: Ablation atrial fibrillation;  Surgeon: Wildre Zamudio MD;  Location:  [facility];  Service: Cardiovascular;  Laterality: N/A;    CARDIAC ELECTROPHYSIOLOGY PROCEDURE N/A 3/30/2023    Procedure: Ablation atrial fibrillation--redo;  Surgeon: Wilder Zamudio MD;  Location:  [facility];  Service: Cardiovascular;  Laterality: N/A;    COLONOSCOPY      COLONOSCOPY N/A 2018    Procedure: COLONOSCOPY w/ polypectomy;  Surgeon: Gilberto Stringer MD;  Location: [facility] OR;  Service: Gastroenterology    COLONOSCOPY W/ POLYPECTOMY N/A 11/15/2021    Procedure: COLONOSCOPY; POLYPECTOMY;  Surgeon: Gilberto Stringer MD;  Location: [facility] OR;  Service: Gastroenterology;  Laterality: N/A;  POLYP  DIVERTICULOSIS    ENDOSCOPY      EYE SURGERY      Bilateral Cataracts        PT Ortho       Row Name 24 1300       Subjective    Subjective Comments \"I am better; I have been able to walk 1/2 mile; 3 " "times this week.\" 'I was in [place] over the weekend and was on my feet a lot and when I got home, I was really sore. \"Going upstairs isn't as bad, but \"I still feel it.\" Pt reports her exercises are going okay. \"I do notice that I have more pain after I am up on my feet a lot.\"  -LN       Precautions and Contraindications    Precautions/Limitations no known precautions/limitations  -LN       Subjective Pain    Able to rate subjective pain? yes  -LN    Pre-Treatment Pain Level 2  -LN    Subjective Pain Comment getting out of car= 3/10  -LN              User Key  (r) = Recorded By, (t) = Taken By, (c) = Cosigned By      Initials Name Provider Type    Jael Archuleta, PT Physical Therapist                                 PT Assessment/Plan       Row Name 08/02/24 1300          PT Assessment    Assessment Comments Pt with overall decreased c/o pain but pain still persists sharon with being on her feet a lot and with getting in and out of car. She is doing well with HEP. Overall decreased L sided sciatica sx with decreased tenderness palpated L LS/buttocks/hip area. She has been able to start doing some 1/2 mile walks. Pt also reports less pain now with going up stairs.  -LN        PT Plan    PT Frequency 1x/week  -LN     PT Plan Comments Continue per POC; increase frequency back to 1 x week while she is getting DN. MET PRN; check pelvic and sacral alignment next visit. Continue modalities as needed; therapeutic exercises with HEP & pt education.  -LN               User Key  (r) = Recorded By, (t) = Taken By, (c) = Cosigned By      Initials Name Provider Type    Jael Archuleta, PT Physical Therapist                     Modalities       Row Name 08/02/24 1300             Precautions    Existing Precautions/Restrictions no known precautions/restrictions  -LN         Moist Heat    MH Applied Yes  -LN      Location LB/LS/buttocks area & L hip with IFC with pt supine in hooklying.  -LN      PT Moist " "Heat Minutes --  15 minutes  -LN      MH S/P Rx Yes  -LN         ELECTRICAL STIMULATION    Attended/Unattended Unattended  -LN      Stimulation Type IFC  -LN      Location/Electrode Placement/Other L LS area/pirifomis/hip with MH.  -LN      PT E-Stim Unattended Minutes 15  -LN                User Key  (r) = Recorded By, (t) = Taken By, (c) = Cosigned By      Initials Name Provider Type    Jael Archuleta, PT Physical Therapist                   OP Exercises       Row Name 08/02/24 1300             Precautions    Existing Precautions/Restrictions no known precautions/restrictions  -LN         Subjective    Subjective Comments \"I am better; I have been able to walk 1/2 mile; 3 times this week.\" 'I was in [place] over the weekend and was on my feet a lot and when I got home, I was really sore. \"Going upstairs isn't as bad, but \"I still feel it.\" Pt reports her exercises are going okay. \"I do notice that I have more pain after I am up on my feet a lot.\"  -LN         Subjective Pain    Able to rate subjective pain? yes  -LN      Pre-Treatment Pain Level 2  -LN      Subjective Pain Comment getting out of car= 3/10  -LN         Exercise 1    Exercise Name 1 verbally reviewed HEP  -LN                User Key  (r) = Recorded By, (t) = Taken By, (c) = Cosigned By      Initials Name Provider Type    Jael Archuleta, PT Physical Therapist                                     Therapy Education  Education Details: Pt to continue with her HEP and use MH/CP PRN.  Pt to continue with her walking but cautioned her to not push it too much.  Given: HEP, Symptoms/condition management, Posture/body mechanics  Program: Reinforced  How Provided: Verbal  Provided to: Patient  Level of Understanding: Verbalized              Time Calculation:   Start Time: 1300  Stop Time: 1330  Time Calculation (min): 30 min  Untimed Charges  PT E-Stim Unattended Minutes: 15  PT Moist Heat Minutes:  (15 minutes)  Total Minutes  Untimed " Charges Total Minutes: 15   Total Minutes: 15  Therapy Charges for Today       Code Description Service Date Service Provider Modifiers Qty    14570146016 HC PT ELECTRICAL STIM UNATTENDED 8/2/2024 Jael eLyva, PT  1                      Jael Leyva, PT  8/2/2024

## 2024-08-02 NOTE — THERAPY TREATMENT NOTE
"  Outpatient Physical Therapy Ortho Treatment Note/Dry needling session   Maryellen Hopper     Patient Name: Berta Garay  : 1940  MRN: [id number]  Today's Date: 2024      Visit Date: 2024      Visit Dx:    ICD-10-CM ICD-9-CM   1. Acute bilateral low back pain with left-sided sciatica  M54.42 724.2     724.3   2. Sciatica of left side  M54.32 724.3       Patient Active Problem List   Diagnosis    Screening for colon cancer    Encounter for screening for malignant neoplasm of colon    Personal history of colonic polyps    Atrial fibrillation with RVR    Benign essential HTN    Hyperlipidemia LDL goal <100    H/O cardiac radiofrequency ablation--PVI 2023, redo 3/2023    Atrial fibrillation, persistent        Past Medical History:   Diagnosis Date    Atrial fibrillation     Colon polyp     GERD (gastroesophageal reflux disease)     Hyperlipidemia     Hypertension         Past Surgical History:   Procedure Laterality Date    CARDIAC ELECTROPHYSIOLOGY PROCEDURE N/A 1/10/2023    Procedure: Ablation atrial fibrillation;  Surgeon: Wilder Zamudio MD;  Location: [facility] CATH INVASIVE LOCATION;  Service: Cardiovascular;  Laterality: N/A;    CARDIAC ELECTROPHYSIOLOGY PROCEDURE N/A 3/30/2023    Procedure: Ablation atrial fibrillation--redo;  Surgeon: Wilder Zamudio MD;  Location:  [facility];  Service: Cardiovascular;  Laterality: N/A;    COLONOSCOPY      COLONOSCOPY N/A 2018    Procedure: COLONOSCOPY w/ polypectomy;  Surgeon: Gilberto Stringer MD;  Location: [facility] OR;  Service: Gastroenterology    COLONOSCOPY W/ POLYPECTOMY N/A 11/15/2021    Procedure: COLONOSCOPY; POLYPECTOMY;  Surgeon: Gilberto Stringer MD;  Location: [facility] OR;  Service: Gastroenterology;  Laterality: N/A;  POLYP  DIVERTICULOSIS    ENDOSCOPY      EYE SURGERY      Bilateral Cataracts        PT Ortho       Row Name 24 1330       Subjective    Subjective Comments \"I am better; I have been " "able to walk a 1/2 mile, 3 times this week.\" \"I was in [place] over the weekend and was on my feet a lot and when I got home, I was really sore. \"Going upstairs isn't as bad, but \"I still feel it.\" Pt reports her exercises are going okay. 'I do notice increased pain if I am on my feet a lot.\" Pt requesting to do dry needling today.  -LN       Precautions and Contraindications    Precautions/Limitations no known precautions/limitations  -LN       Subjective Pain    Able to rate subjective pain? yes  -LN    Pre-Treatment Pain Level 2  -LN    Subjective Pain Comment getting out of car when she arrived to PT= 3/10  -LN              User Key  (r) = Recorded By, (t) = Taken By, (c) = Cosigned By      Initials Name Provider Type    Jael Archuleta, PT Physical Therapist                                 PT Assessment/Plan       Row Name 08/02/24 1400          PT Assessment    Assessment Comments Pt tolerated moderate dosage of DN well today. She did have some sensitivity in area of inferior cluneal; otherwise no complaints. Pt with a QST score of 0, which demonstrates that she should get good and long lasting results from the DN.  -LN        PT Plan    PT Frequency 1x/week  -LN     Predicted Duration of Therapy Intervention (PT) for DN; 4-6 visits  -LN     PT Plan Comments Assess benefit of DN and increase dosage as tolerated.  -LN               User Key  (r) = Recorded By, (t) = Taken By, (c) = Cosigned By      Initials Name Provider Type    Jael Archuleta, PT Physical Therapist                       OP Exercises       Row Name 08/02/24 1330             Subjective    Subjective Comments \"I am better; I have been able to walk a 1/2 mile, 3 times this week.\" \"I was in [place] over the weekend and was on my feet a lot and when I got home, I was really sore. \"Going upstairs isn't as bad, but \"I still feel it.\" Pt reports her exercises are going okay. 'I do notice increased pain if I am on my feet a " "lot.\" Pt requesting to do dry needling today.  -LN         Subjective Pain    Able to rate subjective pain? yes  -LN      Pre-Treatment Pain Level 2  -LN      Subjective Pain Comment getting out of car when she arrived to PT= 3/10  -LN                User Key  (r) = Recorded By, (t) = Taken By, (c) = Cosigned By      Initials Name Provider Type    Jael Archuleta, PT Physical Therapist                             Manual Rx (Last 36 Hours)       Manual Treatments       Row Name 08/02/24 1330             Manual Rx 1    Manual Rx 1 Location Trial of Dry needling with moderate dosage (12 needles) to the following HNTrP: 2 inch needles to B Posterior cutaneous of L2, B posterior cutaneous of L5; B inferior gluteal; 4- 2 inch needles to L superior cluneal; 2 inch needle to L iliotibial and 1 inch needle to L inferior cluneal (lateral tender spot).  -LN      Manual Rx 1 Duration DN done with pt prone. QST score of 0 today.  -LN                User Key  (r) = Recorded By, (t) = Taken By, (c) = Cosigned By      Initials Name Provider Type    Jael Archuleta, PT Physical Therapist                        Therapy Education  Education Details: Pt encouraged to drink plenty of water today since having a dry needling session. All questions about DN answered today.  Given: Other (comment) (DN)  Program: New  How Provided: Verbal  Provided to: Patient  Level of Understanding: Verbalized              Time Calculation:   Start Time: 1330  Stop Time: 1405  Time Calculation (min): 35 min  Therapy Charges for Today       Code Description Service Date Service Provider Modifiers Qty    79299292070  PT SELF PAY DRY NEEDLING SESSION 8/2/2024 Jael Leyva, PT  1                      Jael Leyva, PT  8/2/2024     "

## 2024-08-08 ENCOUNTER — HOSPITAL ENCOUNTER (OUTPATIENT)
Dept: PHYSICAL THERAPY | Facility: HOSPITAL | Age: 84
Setting detail: THERAPIES SERIES
Discharge: HOME | End: 2024-08-08

## 2024-08-08 ENCOUNTER — HOSPITAL ENCOUNTER (OUTPATIENT)
Dept: PHYSICAL THERAPY | Facility: HOSPITAL | Age: 84
Setting detail: THERAPIES SERIES
Discharge: HOME | End: 2024-08-08
Payer: MEDICARE

## 2024-08-08 DIAGNOSIS — M54.42 ACUTE BILATERAL LOW BACK PAIN WITH LEFT-SIDED SCIATICA: Primary | ICD-10-CM

## 2024-08-08 DIAGNOSIS — M54.32 SCIATICA OF LEFT SIDE: ICD-10-CM

## 2024-08-08 PROCEDURE — 97140 MANUAL THERAPY 1/> REGIONS: CPT

## 2024-08-08 PROCEDURE — G0283 ELEC STIM OTHER THAN WOUND: HCPCS

## 2024-08-08 NOTE — THERAPY TREATMENT NOTE
"  Outpatient Physical Therapy Ortho Treatment Note/Dry needling session   Maryellen Hopper     Patient Name: Berta Garay  : 1940  MRN: [id number]  Today's Date: 2024      Visit Date: 2024    Visit Dx:    ICD-10-CM ICD-9-CM   1. Acute bilateral low back pain with left-sided sciatica  M54.42 724.2     724.3   2. Sciatica of left side  M54.32 724.3       Patient Active Problem List   Diagnosis    Screening for colon cancer    Encounter for screening for malignant neoplasm of colon    Personal history of colonic polyps    Atrial fibrillation with RVR    Benign essential HTN    Hyperlipidemia LDL goal <100    H/O cardiac radiofrequency ablation--PVI 2023, redo 3/2023    Atrial fibrillation, persistent        Past Medical History:   Diagnosis Date    Atrial fibrillation     Colon polyp     GERD (gastroesophageal reflux disease)     Hyperlipidemia     Hypertension         Past Surgical History:   Procedure Laterality Date    CARDIAC ELECTROPHYSIOLOGY PROCEDURE N/A 1/10/2023    Procedure: Ablation atrial fibrillation;  Surgeon: Wilder Zamudio MD;  Location: [facility] CATH INVASIVE LOCATION;  Service: Cardiovascular;  Laterality: N/A;    CARDIAC ELECTROPHYSIOLOGY PROCEDURE N/A 3/30/2023    Procedure: Ablation atrial fibrillation--redo;  Surgeon: Wilder Zamudio MD;  Location:  [facility] CATH INVASIVE LOCATION;  Service: Cardiovascular;  Laterality: N/A;    COLONOSCOPY      COLONOSCOPY N/A 2018    Procedure: COLONOSCOPY w/ polypectomy;  Surgeon: Gilberto Stringer MD;  Location: [facility] OR;  Service: Gastroenterology    COLONOSCOPY W/ POLYPECTOMY N/A 11/15/2021    Procedure: COLONOSCOPY; POLYPECTOMY;  Surgeon: Gilberto Stringer MD;  Location: [facility] OR;  Service: Gastroenterology;  Laterality: N/A;  POLYP  DIVERTICULOSIS    ENDOSCOPY      EYE SURGERY      Bilateral Cataracts        PT Ortho       Row Name 24 2830       Subjective    Subjective Comments \"The needling did help.\" \"On " "Sunday, I started to feel it a little more, but up until then I felt much better.\" \"I still feel it across my lower back but it isn't as bad.\" Reports relief with trunk extension. Pt reported less pain when she got out of the car today. Pt requesting another dry needling session today.  -LN       Precautions and Contraindications    Precautions/Limitations no known precautions/limitations  -LN       Subjective Pain    Able to rate subjective pain? yes  -LN    Pre-Treatment Pain Level 3  -LN    Subjective Pain Comment across lower back; worse with sitting too long  -LN              User Key  (r) = Recorded By, (t) = Taken By, (c) = Cosigned By      Initials Name Provider Type    Jael Archuleta, PT Physical Therapist                                 PT Assessment/Plan       Row Name 08/08/24 1530          PT Assessment    Assessment Comments Pt tolerated increased dosage of dry needling well today. Pt did have some aching with the needles in L HS, in path of sciatic nerve; aching is a good sign of DN working. Pt did report benefit from first session on needling.  -LN        PT Plan    PT Frequency 1x/week  -LN     Predicted Duration of Therapy Intervention (PT) for DN  -LN     PT Plan Comments Continue DN 1 x week and increase dosage as tolerated.  -LN               User Key  (r) = Recorded By, (t) = Taken By, (c) = Cosigned By      Initials Name Provider Type    Jael Archuleta, PT Physical Therapist                       OP Exercises       Row Name 08/08/24 1530             Subjective    Subjective Comments \"The needling did help.\" \"On Sunday, I started to feel it a little more, but up until then I felt much better.\" \"I still feel it across my lower back but it isn't as bad.\" Reports relief with trunk extension. Pt reported less pain when she got out of the car today. Pt requesting another dry needling session today.  -LN         Subjective Pain    Able to rate subjective pain? yes  -LN      " Pre-Treatment Pain Level 3  -LN      Subjective Pain Comment across lower back; worse with sitting too long  -LN                User Key  (r) = Recorded By, (t) = Taken By, (c) = Cosigned By      Initials Name Provider Type    Jael Archuleta, PT Physical Therapist                             Manual Rx (Last 36 Hours)       Manual Treatments       Row Name 08/08/24 1530             Manual Rx 1    Manual Rx 1 Location Trial of Dry needling with high dosage (26 needles) to the following HNTrP: 2 inch needles to B Posterior cutaneous of L2, B posterior cutaneous of L5; B inferior gluteal; 5- 2 inch needles to L superior cluneal; 2 inch needle to L iliotibial & 2 additional 2 inch needles to tender spots in proximal ITB and 1 inch needle to L inferior cluneal (lateral tender spot); 4- 1 inch needles to tender spots around L greater trochanter and 2- 2 inch needles to L HS muscle(along the path of the sciatic nerve). 2 inch needles to B L3, and L4 paravetebrals; &  1- 2 inch needle to L psoas.  -LN      Manual Rx 1 Duration DN done with pt prone.  -LN                User Key  (r) = Recorded By, (t) = Taken By, (c) = Cosigned By      Initials Name Provider Type    Jael Archuleta, PT Physical Therapist                        Therapy Education  Education Details: Pt advised to drink plenty of water today due to having a dry needling session.  Given: Other (comment) (DN)  Program: Reinforced  How Provided: Verbal  Provided to: Patient  Level of Understanding: Verbalized              Time Calculation:   Start Time: 1530  Stop Time: 1600  Time Calculation (min): 30 min  Therapy Charges for Today       Code Description Service Date Service Provider Modifiers Qty    28999149902  PT SELF PAY DRY NEEDLING SESSION 8/8/2024 Jael Leyva, PT  1                      Jael Leyva PT  8/8/2024

## 2024-08-08 NOTE — THERAPY TREATMENT NOTE
"  Outpatient Physical Therapy Ortho Treatment Note   Maryellen Hopper     Patient Name: Berta Garay  : 1940  MRN: [id number]  Today's Date: 2024        Visit Date: 2024    Visit Dx:    ICD-10-CM ICD-9-CM   1. Acute bilateral low back pain with left-sided sciatica  M54.42 724.2     724.3   2. Sciatica of left side  M54.32 724.3       Patient Active Problem List   Diagnosis    Screening for colon cancer    Encounter for screening for malignant neoplasm of colon    Personal history of colonic polyps    Atrial fibrillation with RVR    Benign essential HTN    Hyperlipidemia LDL goal <100    H/O cardiac radiofrequency ablation--PVI 2023, redo 3/2023    Atrial fibrillation, persistent        Past Medical History:   Diagnosis Date    Atrial fibrillation     Colon polyp     GERD (gastroesophageal reflux disease)     Hyperlipidemia     Hypertension         Past Surgical History:   Procedure Laterality Date    CARDIAC ELECTROPHYSIOLOGY PROCEDURE N/A 1/10/2023    Procedure: Ablation atrial fibrillation;  Surgeon: Wilder Zamudio MD;  Location:  [facility] CATH INVASIVE LOCATION;  Service: Cardiovascular;  Laterality: N/A;    CARDIAC ELECTROPHYSIOLOGY PROCEDURE N/A 3/30/2023    Procedure: Ablation atrial fibrillation--redo;  Surgeon: Wilder Zamudio MD;  Location:  [facility] CATH INVASIVE LOCATION;  Service: Cardiovascular;  Laterality: N/A;    COLONOSCOPY      COLONOSCOPY N/A 2018    Procedure: COLONOSCOPY w/ polypectomy;  Surgeon: Gilberto Stringer MD;  Location: [facility] OR;  Service: Gastroenterology    COLONOSCOPY W/ POLYPECTOMY N/A 11/15/2021    Procedure: COLONOSCOPY; POLYPECTOMY;  Surgeon: Gilberto Stringer MD;  Location: [facility] OR;  Service: Gastroenterology;  Laterality: N/A;  POLYP  DIVERTICULOSIS    ENDOSCOPY      EYE SURGERY      Bilateral Cataracts        PT Ortho       Row Name 24 1500       Subjective    Subjective Comments \"The needling did help.\" \"On , I started to " "feel it a little more, but up until then I felt much better.\" \"I still feel it across my lower back but it isn't as bad.\" Reports relief with trunk extension. Pt reports she is doing well with her HEP.  -LN       Precautions and Contraindications    Precautions/Limitations no known precautions/limitations  -LN       Subjective Pain    Able to rate subjective pain? yes  -LN    Pre-Treatment Pain Level 3  -LN    Subjective Pain Comment across lower back; worse with sitting too long  -LN              User Key  (r) = Recorded By, (t) = Taken By, (c) = Cosigned By      Initials Name Provider Type    Jael Archuleta, PT Physical Therapist                                 PT Assessment/Plan       Row Name 08/08/24 1500          PT Assessment    Assessment Comments Pt with overall decreased c/o pain but still reporting soreness across lower back but less. Pt also still with tenderness L hip/greater trochanter/buttocks, LS area and along ITB, but overall is less.  She does get benefit from IFC & MH and she is doing well with HEP. She does still report increased pain when going up stairs when she tries to do reciprocal steps. Less pain reported when getting out of car today.  Pt did need MET for pelvic and sacral obliquity but corrected easily and overall alignment is better.  -LN        PT Plan    PT Frequency 1x/week  -LN     PT Plan Comments Continue per POC; increase frequency back to 1 x week while she is getting DN. MET PRN; re-check pelvic and sacral alignment next visit. Continue modalities as needed; therapeutic exercises with HEP & pt education.  -LN               User Key  (r) = Recorded By, (t) = Taken By, (c) = Cosigned By      Initials Name Provider Type    Jael Archuleta, PT Physical Therapist                     Modalities       Row Name 08/08/24 1500             Moist Heat    MH Applied Yes  -LN      Location LB/LS/buttocks area & L hip with IFC with pt supine in hooklying.  -LN      PT " "Moist Heat Minutes --  15 minutes  -LN      MH S/P Rx Yes  -LN         ELECTRICAL STIMULATION    Attended/Unattended Unattended  -LN      Stimulation Type IFC  -LN      Location/Electrode Placement/Other L LS area/pirifomis/hip with MH.  -LN      PT E-Stim Unattended Minutes 15  -LN                User Key  (r) = Recorded By, (t) = Taken By, (c) = Cosigned By      Initials Name Provider Type    Jael Archuleta, PT Physical Therapist                   OP Exercises       Row Name 08/08/24 1500             Precautions    Existing Precautions/Restrictions no known precautions/restrictions  -LN         Subjective    Subjective Comments \"The needling did help.\" \"On Sunday, I started to feel it a little more, but up until then I felt much better.\" \"I still feel it across my lower back but it isn't as bad.\" Reports relief with trunk extension. Pt reports she is doing well with her HEP.  -LN         Subjective Pain    Able to rate subjective pain? yes  -LN      Pre-Treatment Pain Level 3  -LN      Subjective Pain Comment across lower back; worse with sitting too long  -LN         Total Minutes    23883 - PT Manual Therapy Minutes 10  -LN         Exercise 1    Exercise Name 1 verbally reviewed HEP  -LN                User Key  (r) = Recorded By, (t) = Taken By, (c) = Cosigned By      Initials Name Provider Type    Jael Archuleta, PT Physical Therapist                             Manual Rx (Last 36 Hours)       Manual Treatments       Row Name 08/08/24 1500             Total Minutes    73852 - PT Manual Therapy Minutes 10  -LN         Manual Rx 1    Manual Rx 1 Location Pelvis  -LN      Manual Rx 1 Type MET- shotgun; L pelvic outflare; FRSr coupled with EMERSON posterior sacral torsion  -LN      Manual Rx 1 Duration Good pelvic alignment noted today; only L pelvic outflare  noted; improved sacral alignment noted after MET.  -LN                User Key  (r) = Recorded By, (t) = Taken By, (c) = Cosigned By "      Initials Name Provider Type    LN Jael Leyva, PT Physical Therapist                        Therapy Education  Given: HEP, Symptoms/condition management, Posture/body mechanics  Program: Reinforced  How Provided: Verbal  Provided to: Patient  Level of Understanding: Verbalized              Time Calculation:   Start Time: 1500  Stop Time: 1530  Time Calculation (min): 30 min  Timed Charges  09090 - PT Manual Therapy Minutes: 10  Untimed Charges  PT E-Stim Unattended Minutes: 15  PT Moist Heat Minutes:  (15 minutes)  Total Minutes  Timed Charges Total Minutes: 10  Untimed Charges Total Minutes: 15   Total Minutes: 25  Therapy Charges for Today       Code Description Service Date Service Provider Modifiers Qty    27538689715 HC PT MANUAL THERAPY EA 15 MIN 8/8/2024 Jael Leyva, PT GP 1    43101548042 HC PT ELECTRICAL STIM UNATTENDED 8/8/2024 Jael Leyva, PT  1                      Jael Leyva, PT  8/8/2024

## 2024-08-14 ENCOUNTER — APPOINTMENT (OUTPATIENT)
Dept: PHYSICAL THERAPY | Facility: HOSPITAL | Age: 84
End: 2024-08-14

## 2024-08-14 ENCOUNTER — APPOINTMENT (OUTPATIENT)
Dept: PHYSICAL THERAPY | Facility: HOSPITAL | Age: 84
End: 2024-08-14
Payer: MEDICARE

## 2024-08-21 ENCOUNTER — HOSPITAL ENCOUNTER (OUTPATIENT)
Dept: PHYSICAL THERAPY | Facility: HOSPITAL | Age: 84
Setting detail: THERAPIES SERIES
Discharge: HOME | End: 2024-08-21
Payer: MEDICARE

## 2024-08-21 ENCOUNTER — HOSPITAL ENCOUNTER (OUTPATIENT)
Dept: PHYSICAL THERAPY | Facility: HOSPITAL | Age: 84
Setting detail: THERAPIES SERIES
Discharge: HOME | End: 2024-08-21

## 2024-08-21 DIAGNOSIS — M54.32 SCIATICA OF LEFT SIDE: ICD-10-CM

## 2024-08-21 DIAGNOSIS — M54.42 ACUTE BILATERAL LOW BACK PAIN WITH LEFT-SIDED SCIATICA: Primary | ICD-10-CM

## 2024-08-21 PROCEDURE — G0283 ELEC STIM OTHER THAN WOUND: HCPCS

## 2024-08-21 NOTE — THERAPY TREATMENT NOTE
"  Outpatient Physical Therapy Ortho Treatment Note   Maryellen Hopper     Patient Name: Berta Garay  : 1940  MRN: [id number]  Today's Date: 2024        Visit Date: 2024    Visit Dx:    ICD-10-CM ICD-9-CM   1. Acute bilateral low back pain with left-sided sciatica  M54.42 724.2     724.3   2. Sciatica of left side  M54.32 724.3       Patient Active Problem List   Diagnosis    Screening for colon cancer    Encounter for screening for malignant neoplasm of colon    Personal history of colonic polyps    Atrial fibrillation with RVR    Benign essential HTN    Hyperlipidemia LDL goal <100    H/O cardiac radiofrequency ablation--PVI 2023, redo 3/2023    Atrial fibrillation, persistent        Past Medical History:   Diagnosis Date    Atrial fibrillation     Colon polyp     GERD (gastroesophageal reflux disease)     Hyperlipidemia     Hypertension         Past Surgical History:   Procedure Laterality Date    CARDIAC ELECTROPHYSIOLOGY PROCEDURE N/A 1/10/2023    Procedure: Ablation atrial fibrillation;  Surgeon: Wilder Zamudio MD;  Location:  [facility] CATH INVASIVE LOCATION;  Service: Cardiovascular;  Laterality: N/A;    CARDIAC ELECTROPHYSIOLOGY PROCEDURE N/A 3/30/2023    Procedure: Ablation atrial fibrillation--redo;  Surgeon: Wilder Zamudio MD;  Location:  [facility] CATH INVASIVE LOCATION;  Service: Cardiovascular;  Laterality: N/A;    COLONOSCOPY      COLONOSCOPY N/A 2018    Procedure: COLONOSCOPY w/ polypectomy;  Surgeon: Gilberto Stringer MD;  Location: [facility] OR;  Service: Gastroenterology    COLONOSCOPY W/ POLYPECTOMY N/A 11/15/2021    Procedure: COLONOSCOPY; POLYPECTOMY;  Surgeon: Gilberto Stringer MD;  Location: [facility] OR;  Service: Gastroenterology;  Laterality: N/A;  POLYP  DIVERTICULOSIS    ENDOSCOPY      EYE SURGERY      Bilateral Cataracts        PT Ortho       Row Name 24 1400       Subjective    Subjective Comments \"The needling is helping and I can now walk a " "mile and when I get out of the car= 3/10 and if I stretch, it goes down to a 2/10.\"- so reports less pain with this overall.  Pt reports after she walks a lot, she still has some pain going down L lateral leg and sometimes feels it into L knee. No reports of N/T in leg. Pt reports still doing her exercises. Pt reports relief with extension exercises.  \"I do believe it is my sciatic nerve.\"  -LN       Precautions and Contraindications    Precautions/Limitations no known precautions/limitations  -LN       Subjective Pain    Able to rate subjective pain? yes  -LN    Pre-Treatment Pain Level 1  -LN              User Key  (r) = Recorded By, (t) = Taken By, (c) = Cosigned By      Initials Name Provider Type    Jael Archuleta, PT Physical Therapist                                 PT Assessment/Plan       Row Name 08/21/24 1500          PT Assessment    Assessment Comments Pt continues with overall decreased c/o pain & now able to walk 1 mile without any increased pain. She still has increased pain with getting out of car and with extended walking, but overall less. Pain is still in L LS/buttocks area with occasional radiating pain lateral leg and into front of knee. She is doing very well with HEP and continues to report some relief with extension exercises. Good pelvic and sacral alignment noted today, so no MET needed.  -LN        PT Plan    PT Frequency 1 x every other week   -LN     PT Plan Comments Continue per POC; 1 x every other week; will increase frequency as needed.  MET PRN; re-check pelvic and sacral alignment next visit. Continue modalities as needed; therapeutic exercises with HEP & pt education.  -LN               User Key  (r) = Recorded By, (t) = Taken By, (c) = Cosigned By      Initials Name Provider Type    Jael Archuleta, PT Physical Therapist                     Modalities       Row Name  08/21/24 1400          Moist Heat    MH Applied  Yes  -LN     Location  LB/LS/buttocks " "area & L hip with IFC with pt supine in hooklying.  -LN     PT Moist Heat Minutes  --  15 minutes  -LN     MH S/P Rx  Yes  -LN        ELECTRICAL STIMULATION    Attended/Unattended  Unattended  -LN     Stimulation Type  IFC  -LN     Location/Electrode Placement/Other  L LS area/pirifomis/hip with MH.  -LN     PT E-Stim Unattended Minutes  15  -LN               User Key  (r) = Recorded By, (t) = Taken By, (c) = Cosigned By      Initials Name Provider Type    Jael Archuleta, PT Physical Therapist                   OP Exercises       Row Name 08/21/24 1400 08/21/24 1300          Precautions    Existing Precautions/Restrictions no known precautions/restrictions  -LN --        Subjective    Subjective Comments \"The needling is helping and I can now walk a mile and when I get out of the car= 3/10 and if I stretch, it goes down to a 2/10.\"- so reports less pain with this overall.  Pt reports after she walks a lot, she still has some pain going down L lateral leg and sometimes feels it into L knee. No reports of N/T in leg. Pt reports still doing her exercises. Pt reports relief with extension exercises.  \"I do believe it is my sciatic nerve.\"  -LN --        Subjective Pain    Able to rate subjective pain? yes  -LN --     Pre-Treatment Pain Level 1  -LN --        Total Minutes    74370 - PT Manual Therapy Minutes -- 5  -LN        Exercise 1    Exercise Name 1 verbally reviewed HEP  -LN --               User Key  (r) = Recorded By, (t) = Taken By, (c) = Cosigned By      Initials Name Provider Type    Jael Archuleta, PT Physical Therapist                             Manual Rx (Last 36 Hours)       Manual Treatments       Row Name 08/21/24 1300             Total Minutes    81496 - PT Manual Therapy Minutes 5  -LN         Manual Rx 1    Manual Rx 1 Location Pelvis  -LN      Manual Rx 1 Type MET- none needed today to pelvis or sacrum secondary to good alignment noted today.  -LN      Manual Rx 1 Duration " --  -[id number]                User Key  (r) = Recorded By, (t) = Taken By, (c) = Cosigned By      Initials Name Provider Type    Jael Archuleta, PT Physical Therapist                        Therapy Education  Given: HEP  Program: Reinforced  How Provided: Verbal  Provided to: Patient  Level of Understanding: Verbalized              Time Calculation:   Start Time: 1500  Stop Time: 1525  Time Calculation (min): 25 min  Timed Charges  55370 - PT Manual Therapy Minutes: 5  Untimed Charges  PT E-Stim Unattended Minutes: 15  PT Moist Heat Minutes:  (15 minutes)  Total Minutes  Timed Charges Total Minutes: 5  Untimed Charges Total Minutes: 15   Total Minutes: 15  Therapy Charges for Today       Code Description Service Date Service Provider Modifiers Qty    99194860175 HC PT ELECTRICAL STIM UNATTENDED 8/21/2024 Jael Leyva, PT  1                      Jael Leyva, PT  8/21/2024

## 2024-08-21 NOTE — THERAPY TREATMENT NOTE
"  Outpatient Physical Therapy Ortho Treatment Note/Dry needling   Maryellen Hopper     Patient Name: Berta Garay  : 1940  MRN: [id number]  Today's Date: 2024      Visit Date: 2024    Visit Dx:    ICD-10-CM ICD-9-CM   1. Acute bilateral low back pain with left-sided sciatica  M54.42 724.2     724.3   2. Sciatica of left side  M54.32 724.3       Patient Active Problem List   Diagnosis    Screening for colon cancer    Encounter for screening for malignant neoplasm of colon    Personal history of colonic polyps    Atrial fibrillation with RVR    Benign essential HTN    Hyperlipidemia LDL goal <100    H/O cardiac radiofrequency ablation--PVI 2023, redo 3/2023    Atrial fibrillation, persistent        Past Medical History:   Diagnosis Date    Atrial fibrillation     Colon polyp     GERD (gastroesophageal reflux disease)     Hyperlipidemia     Hypertension         Past Surgical History:   Procedure Laterality Date    CARDIAC ELECTROPHYSIOLOGY PROCEDURE N/A 1/10/2023    Procedure: Ablation atrial fibrillation;  Surgeon: Wilder Zamudio MD;  Location: [facility] CATH INVASIVE LOCATION;  Service: Cardiovascular;  Laterality: N/A;    CARDIAC ELECTROPHYSIOLOGY PROCEDURE N/A 3/30/2023    Procedure: Ablation atrial fibrillation--redo;  Surgeon: Wilder Zamudio MD;  Location:  [facility] CATH INVASIVE LOCATION;  Service: Cardiovascular;  Laterality: N/A;    COLONOSCOPY      COLONOSCOPY N/A 2018    Procedure: COLONOSCOPY w/ polypectomy;  Surgeon: Gilberto Stringer MD;  Location: [facility] OR;  Service: Gastroenterology    COLONOSCOPY W/ POLYPECTOMY N/A 11/15/2021    Procedure: COLONOSCOPY; POLYPECTOMY;  Surgeon: Gilberto Stringer MD;  Location: [facility] OR;  Service: Gastroenterology;  Laterality: N/A;  POLYP  DIVERTICULOSIS    ENDOSCOPY      EYE SURGERY      Bilateral Cataracts        PT Ortho       Row Name 24 6775       Subjective    Subjective Comments \"The needling is helping and I can now " "walk a mile and when I get out of the car= 3/10 and if I stretch, it goes down to a 2/10.\"- so reports less pain with this overall. Pt reports after she walks a lot, she still has some pain going down L lateral leg and sometimes feels it into L knee. No reports of N/T in leg. Pt reports still doing her exercises. \"I was doing really good until one day I was doing something and started to feel some pain and I should have stopped but I didn't, so that was my fault.\"  -LN       Precautions and Contraindications    Precautions/Limitations no known precautions/limitations  -LN       Subjective Pain    Able to rate subjective pain? yes  -LN    Pre-Treatment Pain Level 1  -LN              User Key  (r) = Recorded By, (t) = Taken By, (c) = Cosigned By      Initials Name Provider Type    Jael Archuleta, PT Physical Therapist                                 PT Assessment/Plan       Row Name 08/21/24 1530          PT Assessment    Assessment Comments Pt tolerated increased dosage of dry needling well today. Pt did have some aching with the needles in L HS, in path of sciatic nerve and had some \"quivering\" of muscles in hip area after DN done.  Pt continues to report benefit of dry needling with overall decreased pain reported and improved walking tolerance.  -LN        PT Plan    PT Frequency Other (comment)  -LN     Predicted Duration of Therapy Intervention (PT) 1 x every other week for DN  -LN     PT Plan Comments Continue DN; to try 1 x every other week and increase dosage as tolerated. Will increase frequency if needed.  -LN               User Key  (r) = Recorded By, (t) = Taken By, (c) = Cosigned By      Initials Name Provider Type    Jael Archuleta, PT Physical Therapist                       OP Exercises       Row Name 08/21/24 1530 08/21/24 1500          Subjective    Subjective Comments \"The needling is helping and I can now walk a mile and when I get out of the car= 3/10 and if I stretch, it " "goes down to a 2/10.\"- so reports less pain with this overall. Pt reports after she walks a lot, she still has some pain going down L lateral leg and sometimes feels it into L knee. No reports of N/T in leg. Pt reports still doing her exercises. \"I was doing really good until one day I was doing something and started to feel some pain and I should have stopped but I didn't, so that was my fault.\"  -LN --        Subjective Pain    Able to rate subjective pain? yes  -LN --     Pre-Treatment Pain Level 1  -LN --        Total Minutes    12329 - PT Manual Therapy Minutes -- --  -LN               User Key  (r) = Recorded By, (t) = Taken By, (c) = Cosigned By      Initials Name Provider Type    Jael Archuleta, PT Physical Therapist                             Manual Rx (Last 36 Hours)       Manual Treatments       Row Name 08/21/24 1500             Total Minutes    34961 - PT Manual Therapy Minutes --  -LN         Manual Rx 1    Manual Rx 1 Location Dry needling with high dosage (43 needles) to the following HNTrP: 2 inch needles to B Posterior cutaneous of L2, B posterior cutaneous of L5; B inferior gluteal; 5- 2 inch needles to L superior cluneal; 2 inch needle to L iliotibial; 1 inch needle to lateral popliteal, and 1 inch needle to sural.  4 additional 2 inch needles to tender spots in proximal ITB; 1-1 inch needle to tender spot lateral knee and 3-1 inch needles to L inferior cluneal (tender spots); 8- 1 inch needles to tender spots around L greater trochanter and 7- 2 inch needles to tender spots L HS muscle(along the path of the sciatic nerve). 2 inch needles to B L1, L3, and L4 paravetebrals.  -LN      Manual Rx 1 Duration DN done with pt prone.  -LN                User Key  (r) = Recorded By, (t) = Taken By, (c) = Cosigned By      Initials Name Provider Type    Jael Archuleta, PT Physical Therapist                        Therapy Education  Education Details: Pt advised to drink plenty of " water today due to having a dry needling session.  Given: Other (comment) (DN)  Program: Reinforced  How Provided: Verbal  Provided to: Patient  Level of Understanding: Verbalized              Time Calculation:   Start Time: 1530  Stop Time: 1605  Time Calculation (min): 35 min  Therapy Charges for Today       Code Description Service Date Service Provider Modifiers Qty    [id number]  PT SELF PAY DRY NEEDLING SESSION 8/21/2024 Jael Leyva, PT  1                      Jael Leyva, PT  8/21/2024

## 2024-09-04 ENCOUNTER — HOSPITAL ENCOUNTER (OUTPATIENT)
Dept: PHYSICAL THERAPY | Facility: HOSPITAL | Age: 84
Setting detail: THERAPIES SERIES
Discharge: HOME | End: 2024-09-04

## 2024-09-04 DIAGNOSIS — M54.42 ACUTE BILATERAL LOW BACK PAIN WITH LEFT-SIDED SCIATICA: Primary | ICD-10-CM

## 2024-09-04 DIAGNOSIS — M54.32 SCIATICA OF LEFT SIDE: ICD-10-CM

## 2024-09-04 PROCEDURE — G0283 ELEC STIM OTHER THAN WOUND: HCPCS

## 2024-09-04 NOTE — THERAPY TREATMENT NOTE
"  Outpatient Physical Therapy Ortho Treatment Note/Dry needling   Maryellen Hopper     Patient Name: Berta Garay  : 1940  MRN: [id number]  Today's Date: 2024        Visit Date: 2024    Visit Dx:    ICD-10-CM ICD-9-CM   1. Acute bilateral low back pain with left-sided sciatica  M54.42 724.2     724.3   2. Sciatica of left side  M54.32 724.3       Patient Active Problem List   Diagnosis    Screening for colon cancer    Encounter for screening for malignant neoplasm of colon    Personal history of colonic polyps    Atrial fibrillation with RVR    Benign essential HTN    Hyperlipidemia LDL goal <100    H/O cardiac radiofrequency ablation--PVI 2023, redo 3/2023    Atrial fibrillation, persistent        Past Medical History:   Diagnosis Date    Atrial fibrillation     Colon polyp     GERD (gastroesophageal reflux disease)     Hyperlipidemia     Hypertension         Past Surgical History:   Procedure Laterality Date    CARDIAC ELECTROPHYSIOLOGY PROCEDURE N/A 1/10/2023    Procedure: Ablation atrial fibrillation;  Surgeon: Wilder Zamudio MD;  Location: [facility] CATH INVASIVE LOCATION;  Service: Cardiovascular;  Laterality: N/A;    CARDIAC ELECTROPHYSIOLOGY PROCEDURE N/A 3/30/2023    Procedure: Ablation atrial fibrillation--redo;  Surgeon: Wilder Zamudio MD;  Location:  [facility] CATH INVASIVE LOCATION;  Service: Cardiovascular;  Laterality: N/A;    COLONOSCOPY      COLONOSCOPY N/A 2018    Procedure: COLONOSCOPY w/ polypectomy;  Surgeon: Gilberto Stringer MD;  Location: [facility] OR;  Service: Gastroenterology    COLONOSCOPY W/ POLYPECTOMY N/A 11/15/2021    Procedure: COLONOSCOPY; POLYPECTOMY;  Surgeon: Gilberto Stringer MD;  Location: [facility] OR;  Service: Gastroenterology;  Laterality: N/A;  POLYP  DIVERTICULOSIS    ENDOSCOPY      EYE SURGERY      Bilateral Cataracts        PT Ortho       Row Name 24 5940       Subjective    Subjective Comments \"The needling did better last visit " "and helped.\" \"I am better than I was for sure.\" \"I walked a mile this morning and I can walk a little bit but then I have to put hard pressure on that 1 spot on the L and I can walk further.\" Pt requesting another dry needling session today. -LN       Precautions and Contraindications    Precautions/Limitations no known precautions/limitations  -LN       Subjective Pain    Able to rate subjective pain? yes  -LN    Pre-Treatment Pain Level 2  -LN    Subjective Pain Comment \"I feel it\"  -LN              User Key  (r) = Recorded By, (t) = Taken By, (c) = Cosigned By      Initials Name Provider Type    LN Jael Leyva, PT Physical Therapist                                 PT Assessment/Plan       Row Name 09/04/24 1330          PT Assessment    Assessment Comments Pt tolerated increased dosage of dry needling well today. Pt did have some aching with the needles in L HS, in path of sciatic nerve and in L LS area (superior cluneal area), but pt reported she felt like the needles were all in good places today.  Pt continues to report benefit of dry needling with overall decreased pain reported and improved walking tolerance.  -LN        PT Plan    PT Frequency Other (comment)  -LN     Predicted Duration of Therapy Intervention (PT) every 7-10 days for DN  -LN     PT Plan Comments Continue DN; to try every 7-10 days and increase dosage as tolerated.  -LN               User Key  (r) = Recorded By, (t) = Taken By, (c) = Cosigned By      Initials Name Provider Type    Jael Archuleta, PT Physical Therapist                       OP Exercises       Row Name 09/04/24 1330             Subjective    Subjective Comments \"The needling did better last visit and helped.\" \"I am better than I was for sure.\" \"I walked a mile this morning and I can walk a little bit but then I have to put hard pressure on that 1 spot on the L and I can walk further.\"  -LN         Subjective Pain    Able to rate subjective pain? yes  " "-LN      Pre-Treatment Pain Level 2  -LN      Subjective Pain Comment \"I feel it\"  -LN                User Key  (r) = Recorded By, (t) = Taken By, (c) = Cosigned By      Initials Name Provider Type    Jael Archuleta, PT Physical Therapist                             Manual Rx (Last 36 Hours)       Manual Treatments       Row Name  09/04/24 1330        Manual Rx 1    Manual Rx 1 Location  Dry needling with high dosage (49 needles) to the following HNTrP: 2 inch needles to B Posterior cutaneous of L2, B posterior cutaneous of L5; B inferior gluteal; 14- 2 inch needles to L superior cluneal; 2 inch needle to L iliotibial; 1 inch needle to lateral popliteal, and 1 inch needle to sural.  4 additional 2 inch needles to tender spots in proximal ITB; 1-1 inch needle to tender spot lateral knee and 3-1/2 inch needles to L inferior cluneal (tender spots); 3- 1 inch needles to tender spots around L greater trochanter and 9- 2 inch needles to tender spots L HS muscle(along the path of the sciatic nerve). 2 inch needles to B L1, L3, and L4 paravetebrals.  -LN     Manual Rx 1 Duration  DN done with pt prone.  -LN               User Key  (r) = Recorded By, (t) = Taken By, (c) = Cosigned By      Initials Name Provider Type    Jael Archuleta, PT Physical Therapist                        Therapy Education  Education Details: Pt advised to drink plenty of water today due to having a dry needling session.  Given: Other (comment) (DN)  Program: Reinforced  How Provided: Verbal  Provided to: Patient  Level of Understanding: Verbalized              Time Calculation:   Start Time: 1330  Stop Time: 1400  Time Calculation (min): 30 min  Therapy Charges for Today       Code Description Service Date Service Provider Modifiers Qty    [id number]  PT SELF PAY DRY NEEDLING SESSION 9/4/2024 Jael Leyva, PT  1                      Jael Leyva, PT  9/4/2024     "

## 2024-09-04 NOTE — THERAPY TREATMENT NOTE
"  Outpatient Physical Therapy Ortho Treatment Note   Maryellen Hopper     Patient Name: Berta Garay  : 1940  MRN: [id number]  Today's Date: 2024      Visit Date: 2024      Visit Dx:    ICD-10-CM ICD-9-CM   1. Acute bilateral low back pain with left-sided sciatica  M54.42 724.2     724.3   2. Sciatica of left side  M54.32 724.3       Patient Active Problem List   Diagnosis    Screening for colon cancer    Encounter for screening for malignant neoplasm of colon    Personal history of colonic polyps    Atrial fibrillation with RVR    Benign essential HTN    Hyperlipidemia LDL goal <100    H/O cardiac radiofrequency ablation--PVI 2023, redo 3/2023    Atrial fibrillation, persistent        Past Medical History:   Diagnosis Date    Atrial fibrillation     Colon polyp     GERD (gastroesophageal reflux disease)     Hyperlipidemia     Hypertension         Past Surgical History:   Procedure Laterality Date    CARDIAC ELECTROPHYSIOLOGY PROCEDURE N/A 1/10/2023    Procedure: Ablation atrial fibrillation;  Surgeon: Wilder Zamudio MD;  Location:  [facility] CATH INVASIVE LOCATION;  Service: Cardiovascular;  Laterality: N/A;    CARDIAC ELECTROPHYSIOLOGY PROCEDURE N/A 3/30/2023    Procedure: Ablation atrial fibrillation--redo;  Surgeon: Wilder Zamudio MD;  Location:  [facility] CATH INVASIVE LOCATION;  Service: Cardiovascular;  Laterality: N/A;    COLONOSCOPY      COLONOSCOPY N/A 2018    Procedure: COLONOSCOPY w/ polypectomy;  Surgeon: Gilberto Stringer MD;  Location: [facility] OR;  Service: Gastroenterology    COLONOSCOPY W/ POLYPECTOMY N/A 11/15/2021    Procedure: COLONOSCOPY; POLYPECTOMY;  Surgeon: Gilberto Stringer MD;  Location: [facility] OR;  Service: Gastroenterology;  Laterality: N/A;  POLYP  DIVERTICULOSIS    ENDOSCOPY      EYE SURGERY      Bilateral Cataracts        PT Ortho       Row Name 24 1300       Subjective    Subjective Comments \"The needling did better last visit and helped.\" " "\"I am better than I was for sure.\" \"I walked a mile this morning and I can walk a little bit but then I have to put hard pressure on that 1 spot on the L and I can walk further.\" She is doing well with her HEP and gets relief sharon with extension exercises.  -LN       Precautions and Contraindications    Precautions/Limitations no known precautions/limitations  -LN       Subjective Pain    Able to rate subjective pain? yes  -LN    Pre-Treatment Pain Level 2  -LN    Subjective Pain Comment \"I feel it.\"  -LN              User Key  (r) = Recorded By, (t) = Taken By, (c) = Cosigned By      Initials Name Provider Type    Jael Archuleta, PT Physical Therapist                                 PT Assessment/Plan       Row Name 09/04/24 1300          PT Assessment    Assessment Comments Pt continues with overall decreased c/o pain & now able to walk 1 mile without any increased pain; except reports she has to take her hand and put pressure on L LS area part way thru the walk.  She still has increased pain with getting out of car and with extended walking, but overall less. Pain is still in L LS/buttocks area with occasional radiating pain lateral leg and into front of knee. She is doing very well with HEP and continues to report relief with extension exercises. Good sacral alignment noted today, so no MET needed and improved pelvic alignment noted with only minimal L pelvic outflare noted.  -LN        PT Plan    PT Frequency Other (comment)  -LN     Predicted Duration of Therapy Intervention (PT) every 7-10 days  -LN     PT Plan Comments Continue per POC; see every 7-10 days.  -LN               User Key  (r) = Recorded By, (t) = Taken By, (c) = Cosigned By      Initials Name Provider Type    Jael Archuleta, PT Physical Therapist                     Modalities       Row Name 09/04/24 1300             Moist Heat    MH Applied Yes  -LN      Location LB/LS/buttocks area & L hip with IFC with pt supine in " "hooklying.  -LN      PT Moist Heat Minutes --  15 minutes  -LN      MH S/P Rx Yes  -LN         ELECTRICAL STIMULATION    Attended/Unattended Unattended  -LN      Stimulation Type IFC  -LN      Location/Electrode Placement/Other L LS area/pirifomis/hip area with MH.  -LN      PT E-Stim Unattended Minutes 15  -LN                User Key  (r) = Recorded By, (t) = Taken By, (c) = Cosigned By      Initials Name Provider Type    Jael Archuleta, PT Physical Therapist                   OP Exercises       Row Name 09/04/24 1313 09/04/24 1300          Precautions    Existing Precautions/Restrictions -- no known precautions/restrictions  -LN        Subjective    Subjective Comments -- \"The needling did better last visit and helped.\" \"I am better than I was for sure.\" \"I walked a mile this morning and I can walk a little bit but then I have to put hard pressure on that 1 spot on the L and I can walk further.\" She is doing well with her HEP and gets relief sharon with extension exercises.  -LN        Subjective Pain    Able to rate subjective pain? -- yes  -LN     Pre-Treatment Pain Level -- 2  -LN     Subjective Pain Comment -- \"I feel it.\"  -LN        Total Minutes    54980 - PT Manual Therapy Minutes 5  -LN --        Exercise 1    Exercise Name 1 -- verbally reviewed HEP  -LN               User Key  (r) = Recorded By, (t) = Taken By, (c) = Cosigned By      Initials Name Provider Type    Jael Archuleta, PT Physical Therapist                             Manual Rx (Last 36 Hours)       Manual Treatments       Row Name 09/04/24 1313             Total Minutes    88195 - PT Manual Therapy Minutes 5  -LN         Manual Rx 1    Manual Rx 1 Location Pelvis  -LN      Manual Rx 1 Type MET- shotgun/L pelvic outflare; none needed today to  sacrum secondary to good alignment noted today.  -LN                User Key  (r) = Recorded By, (t) = Taken By, (c) = Cosigned By      Initials Name Provider Type    JUJU Leyva" Jael Knox, PT Physical Therapist                        Therapy Education  Given: HEP, Symptoms/condition management, Posture/body mechanics  Program: Reinforced  How Provided: Verbal  Provided to: Patient  Level of Understanding: Verbalized              Time Calculation:   Start Time: 1300  Stop Time: 1330  Time Calculation (min): 30 min  Timed Charges  66913 - PT Manual Therapy Minutes: 5  Untimed Charges  PT E-Stim Unattended Minutes: 15  PT Moist Heat Minutes:  (15 minutes)  Total Minutes  Timed Charges Total Minutes: 5  Untimed Charges Total Minutes: 15   Total Minutes: 5  Therapy Charges for Today       Code Description Service Date Service Provider Modifiers Qty    [id number] HC PT ELECTRICAL STIM UNATTENDED 9/4/2024 Jael Leyva, PT  1                      Jael Leyva, PT  9/4/2024

## 2024-09-13 ENCOUNTER — HOSPITAL ENCOUNTER (OUTPATIENT)
Dept: PHYSICAL THERAPY | Facility: HOSPITAL | Age: 84
Setting detail: THERAPIES SERIES
Discharge: HOME | End: 2024-09-13

## 2024-09-13 DIAGNOSIS — M54.32 SCIATICA OF LEFT SIDE: ICD-10-CM

## 2024-09-13 DIAGNOSIS — M54.42 ACUTE BILATERAL LOW BACK PAIN WITH LEFT-SIDED SCIATICA: Primary | ICD-10-CM

## 2024-09-13 PROCEDURE — G0283 ELEC STIM OTHER THAN WOUND: HCPCS

## 2024-09-13 NOTE — THERAPY RE-EVALUATION
"  Outpatient Physical Therapy Ortho Re-Evaluation/[facility]     Patient Name: Berta Garay  : 1940  MRN: [id number]    Today's Date: 2024        Visit Date: 2024    Patient Active Problem List   Diagnosis    Screening for colon cancer    Encounter for screening for malignant neoplasm of colon    Personal history of colonic polyps    Atrial fibrillation with RVR    Benign essential HTN    Hyperlipidemia LDL goal <100    H/O cardiac radiofrequency ablation--PVI 2023, redo 3/2023    Atrial fibrillation, persistent        Past Medical History:   Diagnosis Date    Atrial fibrillation     Colon polyp     GERD (gastroesophageal reflux disease)     Hyperlipidemia     Hypertension         Past Surgical History:   Procedure Laterality Date    CARDIAC ELECTROPHYSIOLOGY PROCEDURE N/A 1/10/2023    Procedure: Ablation atrial fibrillation;  Surgeon: Wilder Zamudio MD;  Location: [facility] CATH INVASIVE LOCATION;  Service: Cardiovascular;  Laterality: N/A;    CARDIAC ELECTROPHYSIOLOGY PROCEDURE N/A 3/30/2023    Procedure: Ablation atrial fibrillation--redo;  Surgeon: Wilder Zamudio MD;  Location:  [facility] CATH INVASIVE LOCATION;  Service: Cardiovascular;  Laterality: N/A;    COLONOSCOPY      COLONOSCOPY N/A 2018    Procedure: COLONOSCOPY w/ polypectomy;  Surgeon: Gilberto Stringer MD;  Location:  [facility] OR;  Service: Gastroenterology    COLONOSCOPY W/ POLYPECTOMY N/A 11/15/2021    Procedure: COLONOSCOPY; POLYPECTOMY;  Surgeon: Gilberto Stringer MD;  Location:  [facility] OR;  Service: Gastroenterology;  Laterality: N/A;  POLYP  DIVERTICULOSIS    ENDOSCOPY      EYE SURGERY      Bilateral Cataracts       Visit Dx:     ICD-10-CM ICD-9-CM   1. Acute bilateral low back pain with left-sided sciatica  M54.42 724.2     724.3   2. Sciatica of left side  M54.32 724.3              PT Ortho       Row Name 24 1000       Subjective    Subjective Comments \"I felt really good over the " "weekend and then I walked on Monday and I had increased pain; I do have a long gravel driveway.\" \"I have had a very busy week as well.\"  -LN       Precautions and Contraindications    Precautions/Limitations no known precautions/limitations  -LN       Subjective Pain    Able to rate subjective pain? yes  -LN    Pre-Treatment Pain Level 2  -LN    Subjective Pain Comment when she got out of the care here  -LN       Lumbosacral Palpation    SI Left:;Tender  -LN    Hamstring Left:;Tender;Guarded/taut  -LN    IT Band Left:;Tender;Guarded/taut  -LN       Hip/Thigh Palpation    SI Left:;Tender  -LN    ITB Left:;Tender;Guarded/taut  -LN       Hip Special Tests    Rex’s test (tightness of ITB) Left:;Positive  -LN       Leg Length Test    True Equal  -LN    Apparent Equal  -LN       Head/Neck/Trunk    Trunk Extension AROM WNL- painfree  -LN    Trunk Flexion AROM WNL- painfree  -LN    Trunk Lt Lateral Flexion AROM WNL- feels it a little but not pain  -LN    Trunk Rt Lateral Flexion AROM WNL- feels it a little but not pain  -LN    Trunk Lt Rotation AROM WNL- painfree  -LN    Trunk Rt Rotation AROM WNL- painfree  -LN    Head/Neck/Trunk Comments --  -LN       MMT Neck/Trunk    Trunk Flexion MMT, Gross Movement (4/5) good  -LN    Trunk Extension MMT, Gross Movement (4/5) good  -LN       MMT Right Lower Ext    Rt Hip Flexion MMT, Gross Movement (5/5) normal  -LN    Rt Hip Extension MMT, Gross Movement (5/5) normal  -LN    Rt Hip ABduction MMT, Gross Movement (5/5) normal  -LN    Rt Hip ADduction MMT, Gross Movement (5/5) normal  -LN    Rt Knee Extension MMT, Gross Movement (5/5) normal  -LN    Rt Knee Flexion MMT, Gross Movement (5/5) normal  -LN    Rt Ankle Plantarflexion MMT, Gross Movement (5/5) normal  -LN    Rt Ankle Dorsiflexion MMT, Gross Movement (5/5) normal  -LN    Rt Lower Extremity Comments  no pain with MMT  -LN       MMT Left Lower Ext    Lt Hip Flexion MMT, Gross Movement (5/5) normal  \"I feel it but not pain\"  -LN "    Lt Hip Extension MMT, Gross Movement (5/5) normal  -LN    Lt Hip ABduction MMT, Gross Movement (5/5) normal  -LN    Lt Hip ADduction MMT, Gross Movement (5/5) normal  -LN    Lt Knee Extension MMT, Gross Movement (5/5) normal  -LN    Lt Knee Flexion MMT, Gross Movement (5/5) normal  -LN    Lt Ankle Plantarflexion MMT, Gross Movement (5/5) normal  -LN    Lt Ankle Dorsiflexion MMT, Gross Movement (5/5) normal  -LN    Lt Lower Extremity Comments  no pain with MMT  -LN       Sensation    Sensation WNL? WNL  -LN    Light Touch No apparent deficits  -LN    Additional Comments no c/o any N/T in legs  -LN       Lower Extremity Flexibility    Hamstrings Left:;Mildly limited;Moderately limited  -LN    ITB Left:;Mildly limited;Moderately limited  -LN    Gastrocnemius Bilateral:;Mildly limited  -LN    Soleus Bilateral:;Mildly limited  -LN       Gait/Stairs (Locomotion)    Comment, (Gait/Stairs) Pt independent with all functional mobility and gait; no AD used and no antalgic gait noted.  -LN              User Key  (r) = Recorded By, (t) = Taken By, (c) = Cosigned By      Initials Name Provider Type    LN Jael Leyva, PT Physical Therapist                                Therapy Education  Education Details: Pt to continue with HEP and continue with her walking, but recommend that she drive to the end of the driveway and then do her walk and see if this better for her back. Pt to use MH/CP PRN.  Given: HEP, Symptoms/condition management, Posture/body mechanics  Program: Reinforced  How Provided: Verbal  Provided to: Patient  Level of Understanding: Verbalized      PT OP Goals       Row Name 09/13/24 1000          PT Short Term Goals    STG Date to Achieve 09/27/24  -LN     STG 1 Pt to verbally report decreased LBP to <5/10 at worst with ADLs and everyday activities.  -LN     STG 1 Progress Partially Met;Ongoing;Progressing  -LN     STG 1 Progress Comments pain did increase to 4-5/10 after walking; but she rated pain  at 2/10 today.  -LN     STG 2 Pt to report decreased radiating L leg pain by 25%-50%.  -LN     STG 2 Progress Met  -LN     STG 2 Progress Comments L leg pain decreased by 50% per pt.  -LN     STG 3 Pt independent with initial HEP issued by therapist.  -LN     STG 3 Progress Met  -LN     STG 4 Pt able to maintain good pelvic alignment between PT sessions with use of MET & core stabilization program.  -LN     STG 4 Progress Partially Met;Ongoing;Progressing  -LN     STG 5 Pt able to come to stand without any c/o increased LB or L leg pain.  -LN     STG 5 Progress Partially Met;Ongoing;Not Met  -LN     STG 5 Progress Comments did have pain with this on  Tuesday and Wed after walking but no pain today  -LN        Long Term Goals    LTG Date to Achieve 10/11/24  -LN     LTG 1 Pt to verbally report decreased LBP to <3/10 at worst with ADLs and everyday activities.  -LN     LTG 1 Progress Ongoing;Progressing  -LN     LTG 1 Progress Comments pain did increase to 4-5/10 after walking; but she rated pain at 2/10 today.  -LN     LTG 2 Pt to report decreased radiating L leg pain by 50%-75%.  -LN     LTG 2 Progress Partially Met;Ongoing;Progressing  -LN     LTG 2 Progress Comments L leg pain decreased by 50% per pt.  -LN     LTG 3 Pt independent with more advanced HEP issued by therapist.  -LN     LTG 3 Progress Ongoing;Progressing  -LN     LTG 4 Trunk ROM WNL and painfree all planes.  -LN     LTG 4 Progress Met  -LN     LTG 5 L hip flexion strength improved to 5/5.  -LN     LTG 5 Progress Met  -LN     LTG 6 Pt able to walk 2-2.5 miles with no c/o any increased LB or left leg pain.  -LN     LTG 6 Progress Ongoing;Progressing  -LN     LTG 7 Decreased tenderness in L LB/LS/buttocks and L ITB/lateral leg to nominal.  -LN     LTG 7 Progress Ongoing;Progressing  -LN     LTG 7 Progress Comments decreased to minimal now; has tenderness palpated sharon in L HS and ITB  -        Time Calculation    PT Goal Re-Cert Due Date 10/11/24  -                User Key  (r) = Recorded By, (t) = Taken By, (c) = Cosigned By      Initials Name Provider Type    Jael Archuleta, PT Physical Therapist                     PT Assessment/Plan       Row Name 09/13/24 1000          PT Assessment    Assessment Comments Pt continues with overall decreased c/o pain & overall improved walking tolerance, but did have increased pain after walking down her long gravel driveway (about 1/4 mile). She still has increased pain with getting out of car and with extended walking, but overall less. Pain is still in L LS/buttocks area with occasional radiating pain lateral leg and into front of knee. She is doing very well with HEP and continues to report relief with extension exercises. She did need MET for sacral alignment today and only L pelvic outflare noted; no anterior rotation noted. Pt with overall improved flexibiity noted L leg but still has tenderness and mm guarding in L HS and ITB. Trunk ROM is now WNL and painfree all planes. She has met 2 out of 5 STG and partially met 3 STG; she has met 2 out of 7 LTG and partially met 1 LTG. She is making very good progress towards all remaining goals. She will benefit from continued PT for continued modalities for pain relief (IFC/MH); MET for pelvic and sacral alignment; dry needling, and progression of exercises.  -LN        PT Plan    PT Frequency Other (comment)  -LN     Predicted Duration of Therapy Intervention (PT) every 7-10 days  -LN     PT Plan Comments Continue per POC; see every 7-10 days. Continue towards all remaining goals.  -LN               User Key  (r) = Recorded By, (t) = Taken By, (c) = Cosigned By      Initials Name Provider Type    Jael Archuleta, PT Physical Therapist                     Modalities       Row Name 09/13/24 1000             Moist Heat    MH Applied Yes  -LN      Location LB/LS/buttocks area & L hip with IFC with pt supine in hooklying.  -LN      PT Moist Heat Minutes --   "15 minutes  -LN      MH S/P Rx Yes  -LN         ELECTRICAL STIMULATION    Attended/Unattended Unattended  -LN      Stimulation Type IFC  -LN      Location/Electrode Placement/Other L LS area/hip/lateral thigh area with MH.  -LN      PT E-Stim Unattended Minutes 15  -LN                User Key  (r) = Recorded By, (t) = Taken By, (c) = Cosigned By      Initials Name Provider Type    Jael Archuleta, PT Physical Therapist                   OP Exercises       Row Name 09/13/24 1000             Precautions    Existing Precautions/Restrictions no known precautions/restrictions  -LN         Subjective    Subjective Comments \"I felt really good over the weekend and then I walked on Monday and I had increased pain; I do have a long gravel driveway.\" \"I have had a very busy week as well.\"  -LN         Subjective Pain    Able to rate subjective pain? yes  -LN      Pre-Treatment Pain Level 2  -LN      Subjective Pain Comment when she got out of the care here  -LN         Exercise 1    Exercise Name 1 verbally reviewed HEP  -LN                User Key  (r) = Recorded By, (t) = Taken By, (c) = Cosigned By      Initials Name Provider Type    Jael Archuleta, PT Physical Therapist                  Manual Rx (Last 36 Hours)       Manual Treatments       Row Name 09/13/24 0900             Manual Rx 1    Manual Rx 1 Location Pelvis  -LN      Manual Rx 1 Type MET- shotgun/L pelvic outflare; none needed today to  sacrum secondary to good alignment noted today.  -LN                User Key  (r) = Recorded By, (t) = Taken By, (c) = Cosigned By      Initials Name Provider Type    Jael Archuleta, PT Physical Therapist                                          Time Calculation:     Start Time: 1000  Stop Time: 1030  Time Calculation (min): 30 min  Untimed Charges  PT E-Stim Unattended Minutes: 15  PT Moist Heat Minutes:  (15 minutes)  Total Minutes  Untimed Charges Total Minutes: 15   Total Minutes: 15 "     Therapy Charges for Today       Code Description Service Date Service Provider Modifiers Qty    24113001541 HC PT ELECTRICAL STIM UNATTENDED 9/13/2024 Jael Leyva, PT  1                      Jael Leyva, PT  9/13/2024

## 2024-09-13 NOTE — THERAPY TREATMENT NOTE
"  Outpatient Physical Therapy Ortho Treatment Note/Dry needling   [facility]     Patient Name: Berta Garay  : 1940  MRN: [id number]  Today's Date: 2024      Visit Date: 2024    Visit Dx:    ICD-10-CM ICD-9-CM   1. Acute bilateral low back pain with left-sided sciatica  M54.42 724.2     724.3   2. Sciatica of left side  M54.32 724.3       Patient Active Problem List   Diagnosis    Screening for colon cancer    Encounter for screening for malignant neoplasm of colon    Personal history of colonic polyps    Atrial fibrillation with RVR    Benign essential HTN    Hyperlipidemia LDL goal <100    H/O cardiac radiofrequency ablation--PVI 2023, redo 3/2023    Atrial fibrillation, persistent        Past Medical History:   Diagnosis Date    Atrial fibrillation     Colon polyp     GERD (gastroesophageal reflux disease)     Hyperlipidemia     Hypertension         Past Surgical History:   Procedure Laterality Date    CARDIAC ELECTROPHYSIOLOGY PROCEDURE N/A 1/10/2023    Procedure: Ablation atrial fibrillation;  Surgeon: Wilder Zamudio MD;  Location: [facility] CATH INVASIVE LOCATION;  Service: Cardiovascular;  Laterality: N/A;    CARDIAC ELECTROPHYSIOLOGY PROCEDURE N/A 3/30/2023    Procedure: Ablation atrial fibrillation--redo;  Surgeon: Wilder Zamudio MD;  Location:  [facility] CATH INVASIVE LOCATION;  Service: Cardiovascular;  Laterality: N/A;    COLONOSCOPY      COLONOSCOPY N/A 2018    Procedure: COLONOSCOPY w/ polypectomy;  Surgeon: Gilberto Stringer MD;  Location: [facility] OR;  Service: Gastroenterology    COLONOSCOPY W/ POLYPECTOMY N/A 11/15/2021    Procedure: COLONOSCOPY; POLYPECTOMY;  Surgeon: Gilberto Stringer MD;  Location: [facility] OR;  Service: Gastroenterology;  Laterality: N/A;  POLYP  DIVERTICULOSIS    ENDOSCOPY      EYE SURGERY      Bilateral Cataracts        PT Ortho       Row Name 24 1030       Subjective    Subjective Comments \"I felt really good over the weekend " "and then I walked on Monday and I had increased pain; I do have a long gravel driveway.\" \"I have had a very busy week as well.\"  \"The needling did really good the last time and it was like a miracle over the weekend, how I felt.\" Pt wanting a dry needling session today.  -LN       Precautions and Contraindications    Precautions/Limitations no known precautions/limitations  -LN       Subjective Pain    Able to rate subjective pain? yes  -LN    Pre-Treatment Pain Level 2  -LN    Subjective Pain Comment when she got out of the car here  -LN              User Key  (r) = Recorded By, (t) = Taken By, (c) = Cosigned By      Initials Name Provider Type    Jael Archuleta, PT Physical Therapist                                 PT Assessment/Plan       Row Name 09/13/24 1000          PT Assessment    Assessment Comments Pt tolerated increased dosage of dry needling well today. Pt did have some aching with the needles in L HS, in path of sciatic nerve and in L LS area (superior cluneal area), but pt reported again that she felt like the needles were all in good places today. Pt continues to report benefit of dry needling with overall decreased pain reported and improved walking tolerance; but did have episode of increased pain after walking down her driveway (gravel) recently so she may have to drive to the end of her driveway and then do her walking. She does report decreased pain with getting out of car.  -LN        PT Plan    PT Frequency Other (comment)  -LN     Predicted Duration of Therapy Intervention (PT) every 7-10 days for DN  -LN     PT Plan Comments Continue DN; to try every 7-10 days and increase dosage as tolerated.  -LN               User Key  (r) = Recorded By, (t) = Taken By, (c) = Cosigned By      Initials Name Provider Type    Jael Archuleta, PT Physical Therapist                       OP Exercises       Row Name 09/13/24 1030             Subjective    Subjective Comments \"I felt " "really good over the weekend and then I walked on Monday and I had increased pain; I do have a long gravel driveway.\" \"I have had a very busy week as well.\"  \"The needling did really good the last time and it was like a miracle over the weekend, how I felt.\" Pt wanting a dry needling session today.  -LN         Subjective Pain    Able to rate subjective pain? yes  -LN      Pre-Treatment Pain Level 2  -LN      Subjective Pain Comment when she got out of the car here  -LN                User Key  (r) = Recorded By, (t) = Taken By, (c) = Cosigned By      Initials Name Provider Type    Jael Archuleta, PT Physical Therapist                             Manual Rx (Last 36 Hours)       Manual Treatments       Row Name 09/13/24 1021             Manual Rx 1    Manual Rx 1 Location Dry needling with high dosage (50 needles) to the following HNTrP: 2 inch needles to B Posterior cutaneous of L2, B posterior cutaneous of L5; B inferior gluteal; 7- 2 inch needles to L superior cluneal; 2 inch needle to L iliotibial; 1 inch needle to lateral popliteal, and 1 inch needle to sural.  4 additional 2 inch needles to tender spots in proximal ITB; 3- 2 inch needles to tender spots medial HS; 1-1 inch needle to tender spot lateral knee and 1-1 inch needles to L inferior cluneal (tender spots); 2- 1 inch needles to tender spots around L greater trochanter and 14- 2 inch & 4- 1 inch needles to tender spots L HS muscle(along the path of the sciatic nerve). 2 inch needles to B L1, L3, and L4 paravetebrals.  -LN      Manual Rx 1 Duration DN done with pt prone.  -LN                User Key  (r) = Recorded By, (t) = Taken By, (c) = Cosigned By      Initials Name Provider Type    Jael Archuleta, PT Physical Therapist                        Therapy Education  Education Details: Pt advised to drink plenty of water today due to having a dry needling session.  Given: Other (comment) (DN)  Program: Reinforced  How Provided: " Verbal  Provided to: Patient  Level of Understanding: Verbalized              Time Calculation:   Start Time: 1025  Stop Time: 1100  Time Calculation (min): 35 min  Therapy Charges for Today       Code Description Service Date Service Provider Modifiers Qty    42698760664  PT SELF PAY DRY NEEDLING SESSION 9/13/2024 Jael Leyva, PT  1                      Jael Leyva, PT  9/13/2024

## 2024-09-19 ENCOUNTER — HOSPITAL ENCOUNTER (OUTPATIENT)
Dept: PHYSICAL THERAPY | Facility: HOSPITAL | Age: 84
Setting detail: THERAPIES SERIES
Discharge: HOME | End: 2024-09-19

## 2024-09-19 DIAGNOSIS — M54.32 SCIATICA OF LEFT SIDE: ICD-10-CM

## 2024-09-19 DIAGNOSIS — M54.42 ACUTE BILATERAL LOW BACK PAIN WITH LEFT-SIDED SCIATICA: Primary | ICD-10-CM

## 2024-09-19 PROCEDURE — G0283 ELEC STIM OTHER THAN WOUND: HCPCS

## 2024-10-02 ENCOUNTER — HOSPITAL ENCOUNTER (OUTPATIENT)
Dept: PHYSICAL THERAPY | Facility: HOSPITAL | Age: 84
Setting detail: THERAPIES SERIES
Discharge: HOME | End: 2024-10-02

## 2024-10-02 ENCOUNTER — HOSPITAL ENCOUNTER (OUTPATIENT)
Dept: PHYSICAL THERAPY | Facility: HOSPITAL | Age: 84
Setting detail: THERAPIES SERIES
Discharge: HOME | End: 2024-10-02
Payer: MEDICARE

## 2024-10-02 DIAGNOSIS — M54.32 SCIATICA OF LEFT SIDE: ICD-10-CM

## 2024-10-02 DIAGNOSIS — M54.42 ACUTE BILATERAL LOW BACK PAIN WITH LEFT-SIDED SCIATICA: Primary | ICD-10-CM

## 2024-10-02 PROCEDURE — G0283 ELEC STIM OTHER THAN WOUND: HCPCS

## 2024-10-02 PROCEDURE — 97140 MANUAL THERAPY 1/> REGIONS: CPT

## 2024-10-02 NOTE — THERAPY TREATMENT NOTE
Outpatient Physical Therapy Ortho Treatment Note/Dry needling session   Maryellen Hopper     Patient Name: Berta Garay  : 1940  MRN: [id number]  Today's Date: 10/2/2024      Visit Date: 10/02/2024    Visit Dx:    ICD-10-CM ICD-9-CM   1. Acute bilateral low back pain with left-sided sciatica  M54.42 724.2     724.3   2. Sciatica of left side  M54.32 724.3       Patient Active Problem List   Diagnosis    Screening for colon cancer    Encounter for screening for malignant neoplasm of colon    Personal history of colonic polyps    Atrial fibrillation with RVR    Benign essential HTN    Hyperlipidemia LDL goal <100    H/O cardiac radiofrequency ablation--PVI 2023, redo 3/2023    Atrial fibrillation, persistent        Past Medical History:   Diagnosis Date    Atrial fibrillation     Colon polyp     GERD (gastroesophageal reflux disease)     Hyperlipidemia     Hypertension         Past Surgical History:   Procedure Laterality Date    CARDIAC ELECTROPHYSIOLOGY PROCEDURE N/A 1/10/2023    Procedure: Ablation atrial fibrillation;  Surgeon: Wilder Zamudio MD;  Location: [facility] CATH INVASIVE LOCATION;  Service: Cardiovascular;  Laterality: N/A;    CARDIAC ELECTROPHYSIOLOGY PROCEDURE N/A 3/30/2023    Procedure: Ablation atrial fibrillation--redo;  Surgeon: Wilder Zamudio MD;  Location:  [facility] CATH INVASIVE LOCATION;  Service: Cardiovascular;  Laterality: N/A;    COLONOSCOPY      COLONOSCOPY N/A 2018    Procedure: COLONOSCOPY w/ polypectomy;  Surgeon: Gilberto Stringer MD;  Location: [facility] OR;  Service: Gastroenterology    COLONOSCOPY W/ POLYPECTOMY N/A 11/15/2021    Procedure: COLONOSCOPY; POLYPECTOMY;  Surgeon: Gilberto Stringer MD;  Location: [facility] OR;  Service: Gastroenterology;  Laterality: N/A;  POLYP  DIVERTICULOSIS    ENDOSCOPY      EYE SURGERY      Bilateral Cataracts        PT Ortho       Row Name 10/02/24 8697       Subjective    Subjective Comments Pt reports that she didn't get  sore after the last dry needing like she did after the one before that. Overall decreased pain reported but persists L side of back/buttocks.  -LN       Precautions and Contraindications    Precautions/Limitations no known precautions/limitations  -LN       Subjective Pain    Able to rate subjective pain? yes  -LN    Pre-Treatment Pain Level 2  2-3/10  -LN              User Key  (r) = Recorded By, (t) = Taken By, (c) = Cosigned By      Initials Name Provider Type    Jael Archuleta, PT Physical Therapist                                 PT Assessment/Plan       Row Name 10/02/24 1700          PT Assessment    Assessment Comments Pt tolerated last dry needling session well without any c/o increased soreness after. She tolerated today's session fairly well but was noted to have increased tenderness to palpation along L ITB and in L HS with increased sensitivity to dry needling in this area; did get a couple episodes of nerve pain down leg and did a couple good twitches in HS with needling today. Pt with overall decreased c/o pain but still reports pain sharon in area of L LS area/buttocks.  -LN        PT Plan    PT Frequency Other (comment)  -LN     Predicted Duration of Therapy Intervention (PT) see below  -LN     PT Plan Comments Continue per POC; next appt. is scheduled for 10/18/24 due to patient going out of town. Pt to see how she does without any DN for the next 16 days and to call and cancel appt. if she feels like she does not need another session.  -LN               User Key  (r) = Recorded By, (t) = Taken By, (c) = Cosigned By      Initials Name Provider Type    Jael Archuleta, PT Physical Therapist                       OP Exercises       Row Name 10/02/24 1330             Subjective    Subjective Comments Pt reports that she didn't get sore after the last dry needing like she did after the one before that. Overall decreased pain reported but persists L side of back/buttocks.  -LN          Subjective Pain    Able to rate subjective pain? yes  -LN      Pre-Treatment Pain Level 2  2-3/10  -LN                User Key  (r) = Recorded By, (t) = Taken By, (c) = Cosigned By      Initials Name Provider Type    Jael Archuleta, PT Physical Therapist                             Manual Rx (Last 36 Hours)       Manual Treatments       Row Name 10/02/24 1330 10/02/24 1313          Manual Rx 1    Manual Rx 1 Location Dry needling with high dosage (47 needles) to the following HNTrP: 2 inch needles to B Posterior cutaneous of L2, B posterior cutaneous of L5; B inferior gluteal (3 inch needles); 11- 2 inch needles to L superior cluneal; 2 inch needle to L iliotibial; 1 inch needle to lateral popliteal, and 1 inch needle to sural.  4 additional 2 inch needles & 1- 1 inch needle(distal) to tender spots along ITB 2-1/2 inch needles to L inferior cluneal (tender spots);  5- 1 inch needles to tender spots around L greater trochanter and 6- 2 inch & 1- 1 inch needle to tender spots L HS muscle(along the path of the sciatic nerve) and 2- 2 inch needles to medial HS tender spots. 2 inch needles to B L1, L3, and L4 paravetebrals.  -LN --  -LN     Manual Rx 1 Duration DN done with pt prone.  -LN --  -LN               User Key  (r) = Recorded By, (t) = Taken By, (c) = Cosigned By      Initials Name Provider Type    Jael Archuleta, PT Physical Therapist                        Therapy Education  Education Details: Pt encouraged to drink plenty of water and use CP/MH as needed for any soreness after DN session today (sharon to L HS/ITB).  Given: Other (comment) (DN)  Program: Reinforced  How Provided: Verbal  Provided to: Patient  Level of Understanding: Verbalized              Time Calculation:   Start Time: 1330  Stop Time: 1410  Time Calculation (min): 40 min  Therapy Charges for Today       Code Description Service Date Service Provider Modifiers Qty    28404345346  PT SELF PAY DRY NEEDLING SESSION  10/2/2024 Autumn, Jael Knox, PT  1                      Jael Leyva, PT  10/2/2024

## 2024-10-02 NOTE — THERAPY TREATMENT NOTE
"  Outpatient Physical Therapy Ortho Treatment Note   Maryellen Hopper     Patient Name: Berta Garay  : 1940  MRN: [id number]  Today's Date: 10/2/2024        Visit Date: 10/02/2024    Visit Dx:    ICD-10-CM ICD-9-CM   1. Acute bilateral low back pain with left-sided sciatica  M54.42 724.2     724.3   2. Sciatica of left side  M54.32 724.3       Patient Active Problem List   Diagnosis    Screening for colon cancer    Encounter for screening for malignant neoplasm of colon    Personal history of colonic polyps    Atrial fibrillation with RVR    Benign essential HTN    Hyperlipidemia LDL goal <100    H/O cardiac radiofrequency ablation--PVI 2023, redo 3/2023    Atrial fibrillation, persistent        Past Medical History:   Diagnosis Date    Atrial fibrillation     Colon polyp     GERD (gastroesophageal reflux disease)     Hyperlipidemia     Hypertension         Past Surgical History:   Procedure Laterality Date    CARDIAC ELECTROPHYSIOLOGY PROCEDURE N/A 1/10/2023    Procedure: Ablation atrial fibrillation;  Surgeon: Wilder Zamudio MD;  Location:  [facility] CATH INVASIVE LOCATION;  Service: Cardiovascular;  Laterality: N/A;    CARDIAC ELECTROPHYSIOLOGY PROCEDURE N/A 3/30/2023    Procedure: Ablation atrial fibrillation--redo;  Surgeon: Wilder Zamudio MD;  Location:  [facility] CATH INVASIVE LOCATION;  Service: Cardiovascular;  Laterality: N/A;    COLONOSCOPY      COLONOSCOPY N/A 2018    Procedure: COLONOSCOPY w/ polypectomy;  Surgeon: Gilberto Stringer MD;  Location: [facility] OR;  Service: Gastroenterology    COLONOSCOPY W/ POLYPECTOMY N/A 11/15/2021    Procedure: COLONOSCOPY; POLYPECTOMY;  Surgeon: Gilberto Stringer MD;  Location: [facility] OR;  Service: Gastroenterology;  Laterality: N/A;  POLYP  DIVERTICULOSIS    ENDOSCOPY      EYE SURGERY      Bilateral Cataracts        PT Ortho       Row Name 10/02/24 1300       Subjective    Subjective Comments \"I am much better, but I still feel it in the " "area on the left side of my back/buttocks.\" \"I haven't been pushing the walking but I have been doing my exercises.\"  -LN       Precautions and Contraindications    Precautions/Limitations no known precautions/limitations  -LN       Subjective Pain    Able to rate subjective pain? yes  -LN    Pre-Treatment Pain Level 2  2-3/10  -LN    Subjective Pain Comment when she got out of car.  -LN              User Key  (r) = Recorded By, (t) = Taken By, (c) = Cosigned By      Initials Name Provider Type    Jael Archuleta, PT Physical Therapist                                 PT Assessment/Plan       Row Name 10/02/24 1300          PT Assessment    Assessment Comments Pt continues with overall decreased c/o pain but still has pain L side of back/buttocks area. She is doing well with HEP. She did need MET for pelvis today as well as for sacrum (which hasn't been needed for > 1 month); both corrected well.  -LN        PT Plan    PT Frequency Other (comment)  -LN     Predicted Duration of Therapy Intervention (PT) see below  -LN     PT Plan Comments Continue per POC; next appt. is scheduled for 10/18/24 due to patient going out of town.  -LN               User Key  (r) = Recorded By, (t) = Taken By, (c) = Cosigned By      Initials Name Provider Type    Jael Archuleta, PT Physical Therapist                     Modalities       Row Name 10/02/24 1300             Moist Heat    MH Applied Yes  -LN      Location LB/LS/buttocks area, & L hip/lateral leg with IFC with pt supine in hooklying.  -LN      PT Moist Heat Minutes --  15 minutes  -LN       S/P Rx Yes  -LN         ELECTRICAL STIMULATION    Attended/Unattended Unattended  -LN      Stimulation Type IFC  -LN      Location/Electrode Placement/Other L LS area/hip/buttocks/lateral thigh (ITB)area with .  -LN      PT E-Stim Unattended Minutes 15  -LN                User Key  (r) = Recorded By, (t) = Taken By, (c) = Cosigned By      Initials Name Provider " "Type    Jael Archuleta, PT Physical Therapist                   OP Exercises       Row Name 10/02/24 1300             Precautions    Existing Precautions/Restrictions no known precautions/restrictions  -LN         Subjective    Subjective Comments \"I am much better, but I still feel it in the area on the left side of my back/buttocks.\" \"I haven't been pushing the walking but I have been doing my exercises.\"  -LN         Subjective Pain    Able to rate subjective pain? yes  -LN      Pre-Treatment Pain Level 2  2-3/10  -LN      Subjective Pain Comment when she got out of car.  -LN         Total Minutes    39579 - PT Manual Therapy Minutes 8  -LN         Exercise 1    Exercise Name 1 verbally reviewed HEP  -LN                User Key  (r) = Recorded By, (t) = Taken By, (c) = Cosigned By      Initials Name Provider Type    Jael Archuleta, PT Physical Therapist                             Manual Rx (Last 36 Hours)       Manual Treatments       Row Name 10/02/24 1300             Total Minutes    37175 - PT Manual Therapy Minutes 8  -LN         Manual Rx 1    Manual Rx 1 Location Pelvis  -LN      Manual Rx 1 Type MET- shotgun; L pelvic outflare; FRSr coupled with EMERSON posterior sacral torsion  -LN      Manual Rx 1 Duration Good pelvic & sacral alignment noted today after MET.  -LN                User Key  (r) = Recorded By, (t) = Taken By, (c) = Cosigned By      Initials Name Provider Type    Jael Archuleta, PT Physical Therapist                        Therapy Education  Education Details: Pt to continue with HEP and use CP/HP as needed.  Given: HEP, Symptoms/condition management, Pain management  Program: Reinforced  How Provided: Verbal  Provided to: Patient  Level of Understanding: Verbalized              Time Calculation:   Start Time: 1300  Stop Time: 1330  Time Calculation (min): 30 min  Timed Charges  15420 - PT Manual Therapy Minutes: 8  Untimed Charges  PT E-Stim Unattended " Minutes: 15  PT Moist Heat Minutes:  (15 minutes)  Total Minutes  Timed Charges Total Minutes: 8  Untimed Charges Total Minutes: 15   Total Minutes: 23  Therapy Charges for Today       Code Description Service Date Service Provider Modifiers Qty    96936512741 HC PT MANUAL THERAPY EA 15 MIN 10/2/2024 Jael Leyva, PT GP 1    87553563106 HC PT ELECTRICAL STIM UNATTENDED 10/2/2024 Jael Leyva, PT  1                      Jael Leyva, PT  10/2/2024

## 2024-10-14 ENCOUNTER — OFFICE VISIT (OUTPATIENT)
Age: 84
End: 2024-10-14
Payer: MEDICARE

## 2024-10-14 VITALS
OXYGEN SATURATION: 97 % | HEART RATE: 95 BPM | SYSTOLIC BLOOD PRESSURE: 130 MMHG | DIASTOLIC BLOOD PRESSURE: 80 MMHG | HEIGHT: 63 IN | BODY MASS INDEX: 26.15 KG/M2 | WEIGHT: 147.6 LBS

## 2024-10-14 DIAGNOSIS — R01.1 HEART MURMUR: ICD-10-CM

## 2024-10-14 DIAGNOSIS — I10 BENIGN ESSENTIAL HTN: ICD-10-CM

## 2024-10-14 DIAGNOSIS — Z98.890 H/O CARDIAC RADIOFREQUENCY ABLATION: ICD-10-CM

## 2024-10-14 DIAGNOSIS — I48.19 ATRIAL FIBRILLATION, PERSISTENT: Primary | ICD-10-CM

## 2024-10-14 PROBLEM — I48.91 ATRIAL FIBRILLATION WITH RVR: Status: RESOLVED | Noted: 2022-11-02 | Resolved: 2024-10-14

## 2024-10-14 PROCEDURE — 1160F RVW MEDS BY RX/DR IN RCRD: CPT | Performed by: NURSE PRACTITIONER

## 2024-10-14 PROCEDURE — 99214 OFFICE O/P EST MOD 30 MIN: CPT | Performed by: NURSE PRACTITIONER

## 2024-10-14 PROCEDURE — 93000 ELECTROCARDIOGRAM COMPLETE: CPT | Performed by: NURSE PRACTITIONER

## 2024-10-14 PROCEDURE — 3079F DIAST BP 80-89 MM HG: CPT | Performed by: NURSE PRACTITIONER

## 2024-10-14 PROCEDURE — 3075F SYST BP GE 130 - 139MM HG: CPT | Performed by: NURSE PRACTITIONER

## 2024-10-14 PROCEDURE — 1159F MED LIST DOCD IN RCRD: CPT | Performed by: NURSE PRACTITIONER

## 2024-10-14 RX ORDER — ATORVASTATIN CALCIUM 40 MG/1
TABLET, FILM COATED ORAL
COMMUNITY
Start: 2024-07-17

## 2024-10-14 RX ORDER — DILTIAZEM HYDROCHLORIDE 360 MG/1
360 CAPSULE, EXTENDED RELEASE ORAL DAILY
Qty: 90 CAPSULE | Refills: 3 | Status: SHIPPED | OUTPATIENT
Start: 2024-10-14

## 2024-10-14 RX ORDER — TRETINOIN 0.25 MG/G
CREAM TOPICAL SEE ADMIN INSTRUCTIONS
COMMUNITY
Start: 2024-09-25 | End: 2025-09-25

## 2024-10-14 NOTE — PROGRESS NOTES
"Date of Office Visit: 10/14/2024  Encounter Provider: TAYLOR Sharma  Place of Service: [facility] CARDIOLOGY  Patient Name: Berta Garay  :1940    Chief Complaint   Patient presents with    Atrial Fibrillation    Follow-up   :     HPI: Berta Garay is a 84 y.o. female who has followed with Dr. Joseph in the past and now with Dr. Zamudio --- persistent A-fib , TY/cardioversion in 2022----maintained sinus rhythm for about 5 months, recurred early November, she was symptomatic, intolerant to beta-blockers but able to tolerate calcium channel blockers.     She saw Dr. Zamudio, she had a PVI in 2023, she did have a TY prior to procedure which showed an EF of 66-70%, mild AI, moderate MR.     2023 follow-up, she felt good but she was in A-fib with RVR, heart rate was in the low 100s, she had a cardioversion on 2023.     She again had recurrence, redo ablation was recommended.     3/30/2023 Redo ablation---PV's were isolated, ablation outside of pul veins along the left atrial roof to create roofline block, ablation in posterior wall of the LA using box lesion set to isolate the LA posterior wall, successful redo ablation in the LA ridge between the appendage and pul vein that was isolated.     Dr. Zamudio saw her in May---was back in AF, 's--\"Her AF  is not fixable\"---he increased the diltiazem, decreased lisinopril and follow up 24 Hr monitor in  which showed AF, avg --meds adjusted--perm AF w/rate control.      She presents for follow up.     She is doing well.  She has no complaints of chest pain, dyspnea, PND, orthopnea or edema.    She just got back from [place].    She had labs in Nov---renal function and electrolytes okay.    Apixaban for OAC.      Past Medical History:   Diagnosis Date    Atrial fibrillation     Colon polyp     GERD (gastroesophageal reflux disease)     Hyperlipidemia     Hypertension  "       Past Surgical History:   Procedure Laterality Date    CARDIAC ELECTROPHYSIOLOGY PROCEDURE N/A 1/10/2023    Procedure: Ablation atrial fibrillation;  Surgeon: Wilder Zamudio MD;  Location:  [facility] CATH INVASIVE LOCATION;  Service: Cardiovascular;  Laterality: N/A;    CARDIAC ELECTROPHYSIOLOGY PROCEDURE N/A 3/30/2023    Procedure: Ablation atrial fibrillation--redo;  Surgeon: Wilder Zamudio MD;  Location:  [facility] CATH INVASIVE LOCATION;  Service: Cardiovascular;  Laterality: N/A;    COLONOSCOPY      COLONOSCOPY N/A 2018    Procedure: COLONOSCOPY w/ polypectomy;  Surgeon: Gilberto Stringer MD;  Location:  [facility] OR;  Service: Gastroenterology    COLONOSCOPY W/ POLYPECTOMY N/A 11/15/2021    Procedure: COLONOSCOPY; POLYPECTOMY;  Surgeon: Gilberto Stringer MD;  Location:  [facility] OR;  Service: Gastroenterology;  Laterality: N/A;  POLYP  DIVERTICULOSIS    ENDOSCOPY      EYE SURGERY      Bilateral Cataracts       Social History     Socioeconomic History    Marital status:    Tobacco Use    Smoking status: Former     Current packs/day: 0.00     Types: Cigarettes     Quit date:      Years since quittin.8    Smokeless tobacco: Never    Tobacco comments:     caffeine - coffee, tea and soda   Vaping Use    Vaping status: Never Used   Substance and Sexual Activity    Alcohol use: Yes     Alcohol/week: 1.0 standard drink of alcohol     Types: 1 Glasses of wine per week     Comment: on occas.    Drug use: No    Sexual activity: Defer       Family History   Problem Relation Age of Onset    Hypertension Mother     Heart disease Father         cabg       Review of Systems   Constitutional: Negative for chills, fever and malaise/fatigue.   Cardiovascular:  Negative for chest pain, dyspnea on exertion, leg swelling, near-syncope, orthopnea, palpitations, paroxysmal nocturnal dyspnea and syncope.   Respiratory:  Negative for cough and shortness of breath.    Musculoskeletal:  Negative for joint  "pain, joint swelling and myalgias.   Gastrointestinal:  Negative for abdominal pain, diarrhea, melena, nausea and vomiting.   Genitourinary:  Negative for frequency and hematuria.   Neurological:  Negative for light-headedness, numbness, paresthesias and seizures.   Allergic/Immunologic: Negative.    All other systems reviewed and are negative.      Allergies   Allergen Reactions    Sulfa Antibiotics Hives    Cefdinir Rash    Clarithromycin Rash         Current Outpatient Medications:     apixaban (Eliquis) 5 MG tablet tablet, Take 1 tablet by mouth Every 12 (Twelve) Hours., Disp: 180 tablet, Rfl: 3    atorvastatin (LIPITOR) 40 MG tablet, , Disp: , Rfl:     citalopram (CeleXA) 20 MG tablet, Take 1 tablet by mouth Daily., Disp: , Rfl:     dilTIAZem CD (CARDIZEM CD) 360 MG 24 hr capsule, Take 1 capsule by mouth Daily., Disp: 90 capsule, Rfl: 3    lansoprazole (PREVACID) 30 MG capsule, Take 1 capsule by mouth Daily., Disp: , Rfl:     lisinopril (PRINIVIL,ZESTRIL) 40 MG tablet, Take 0.5 tablets by mouth Daily., Disp: , Rfl:     multivitamin with minerals tablet tablet, Take 1 tablet by mouth Daily., Disp: , Rfl:     tretinoin (RETIN-A) 0.025 % cream, Apply  topically to the appropriate area as directed See Admin Instructions., Disp: , Rfl:     VITAMIN D, CHOLECALCIFEROL, PO, Take  by mouth., Disp: , Rfl:       Objective:     Vitals:    10/14/24 1119   BP: 130/80   BP Location: Left arm   Patient Position: Sitting   Cuff Size: Adult   Pulse: 95   SpO2: 97%   Weight: 67 kg (147 lb 9.6 oz)   Height: 160 cm (63\")     Body mass index is 26.15 kg/m².    PHYSICAL EXAM:    Vitals Reviewed.   General Appearance: No acute distress, well developed and well nourished.   HENT: Atraumatic, normocephalic. External eyes, ears, and nose normal.   Respiratory: No signs of respiratory distress. Respiration rhythm and depth normal.   Clear to auscultation. No rales, crackles, rhonchi, or wheezing auscultated.   Cardiovascular:  Heart Rate " and Rhythm: Irregularly, irregular. Heart Sounds: S1 and S2.   Murmur--I-II/VI systolic murmur.  Lower Extremities: No edema noted.  Musculoskeletal: Normal movement of extremities  Skin: Warm and dry.   Psychiatric: Patient alert and oriented to person, place, and time. Speech and behavior appropriate. Normal mood and affect.       ECG 12 Lead    Date/Time: 10/14/2024 11:08 AM  Performed by: Astrid Blackwell APRN    Authorized by: Astrid Blackwell APRN  Comparison: compared with previous ECG   Similar to previous ECG  Rhythm: atrial fibrillation  BPM: 95            Assessment:       Diagnosis Plan   1. Atrial fibrillation, persistent  ECG 12 Lead    Adult Transthoracic Echo Complete w/ Color, Spectral and Contrast if Necessary Per Protocol      2. H/O cardiac radiofrequency ablation--PVI 1/2023, redo 3/2023  ECG 12 Lead      3. Benign essential HTN        4. Heart murmur  Adult Transthoracic Echo Complete w/ Color, Spectral and Contrast if Necessary Per Protocol             Plan:       1-2. Persistent AF, s/p PVI 1/2023--redo 3/2023--PV's were isolated, ablated outside PV's---afib recurred>>>rate control.  She had a monitor in 2023 that showed an average heart rate of 107, she is on diltiazem and her heart rates 90s today so I think she is doing fine.  She feels good.    3. HTN, controlled.     4.  She does have a systolic murmur, Dr. Zamudio mention this in his note 6 months ago and mentioned an echo, she is having no symptoms but we will go ahead and have her see Dr. Zamudio in 6 months and check an echo at that time.  She did have a TY prior to one of her procedures in early 2023 that did show mild AI and moderate MR.    Follow up with Dr. Zamudio in 6 months with an echocardiogram.    As always, it has been a pleasure to participate in your patient's care.      Sincerely,         TAYLOR Tafoya

## 2024-10-18 ENCOUNTER — APPOINTMENT (OUTPATIENT)
Dept: PHYSICAL THERAPY | Facility: HOSPITAL | Age: 84
End: 2024-10-18
Payer: MEDICARE

## 2025-03-05 ENCOUNTER — APPOINTMENT (OUTPATIENT)
Dept: WOUND CARE | Facility: HOSPITAL | Age: 85
End: 2025-03-05
Payer: MEDICARE

## 2025-03-05 PROCEDURE — G0463 HOSPITAL OUTPT CLINIC VISIT: HCPCS

## 2025-03-12 ENCOUNTER — APPOINTMENT (OUTPATIENT)
Dept: WOUND CARE | Facility: HOSPITAL | Age: 85
End: 2025-03-12
Payer: MEDICARE

## 2025-03-19 ENCOUNTER — APPOINTMENT (OUTPATIENT)
Dept: WOUND CARE | Facility: HOSPITAL | Age: 85
End: 2025-03-19
Payer: MEDICARE

## 2025-03-26 ENCOUNTER — APPOINTMENT (OUTPATIENT)
Dept: WOUND CARE | Facility: HOSPITAL | Age: 85
End: 2025-03-26
Payer: MEDICARE

## 2025-04-02 ENCOUNTER — APPOINTMENT (OUTPATIENT)
Dept: WOUND CARE | Facility: HOSPITAL | Age: 85
End: 2025-04-02
Payer: MEDICARE

## 2025-04-09 RX ORDER — DILTIAZEM HYDROCHLORIDE 360 MG/1
360 CAPSULE, EXTENDED RELEASE ORAL DAILY
Qty: 90 CAPSULE | Refills: 3 | Status: SHIPPED | OUTPATIENT
Start: 2025-04-09

## 2025-04-18 ENCOUNTER — OFFICE VISIT (OUTPATIENT)
Age: 85
End: 2025-04-18
Payer: MEDICARE

## 2025-04-18 ENCOUNTER — HOSPITAL ENCOUNTER (OUTPATIENT)
Dept: CARDIOLOGY | Facility: HOSPITAL | Age: 85
Discharge: HOME | End: 2025-04-18
Payer: MEDICARE

## 2025-04-18 VITALS
BODY MASS INDEX: 26.05 KG/M2 | WEIGHT: 147 LBS | SYSTOLIC BLOOD PRESSURE: 120 MMHG | DIASTOLIC BLOOD PRESSURE: 84 MMHG | HEIGHT: 63 IN | HEART RATE: 100 BPM

## 2025-04-18 VITALS
WEIGHT: 143 LBS | HEIGHT: 63 IN | DIASTOLIC BLOOD PRESSURE: 84 MMHG | HEART RATE: 114 BPM | BODY MASS INDEX: 25.34 KG/M2 | SYSTOLIC BLOOD PRESSURE: 120 MMHG

## 2025-04-18 DIAGNOSIS — Z98.890 H/O CARDIAC RADIOFREQUENCY ABLATION: Primary | ICD-10-CM

## 2025-04-18 DIAGNOSIS — R01.1 HEART MURMUR: ICD-10-CM

## 2025-04-18 DIAGNOSIS — I48.19 ATRIAL FIBRILLATION, PERSISTENT: ICD-10-CM

## 2025-04-18 LAB
AORTIC DIMENSIONLESS INDEX: 0.49 (DI)
ASCENDING AORTA: 3.5 CM
AV MEAN PRESS GRAD SYS DOP V1V2: 8.3 MMHG
AV VMAX SYS DOP: 197.6 CM/SEC
BH CV ECHO MEAS - ACS: 1.57 CM
BH CV ECHO MEAS - AI P1/2T: 713 MSEC
BH CV ECHO MEAS - AO MAX PG: 15.6 MMHG
BH CV ECHO MEAS - AO ROOT AREA (BSA CORRECTED): 1.5 CM2
BH CV ECHO MEAS - AO ROOT DIAM: 2.6 CM
BH CV ECHO MEAS - AO V2 VTI: 38.5 CM
BH CV ECHO MEAS - AVA(I,D): 1.5 CM2
BH CV ECHO MEAS - EDV(CUBED): 57.6 ML
BH CV ECHO MEAS - EDV(MOD-SP2): 75 ML
BH CV ECHO MEAS - EDV(MOD-SP4): 46 ML
BH CV ECHO MEAS - EF(MOD-SP2): 64 %
BH CV ECHO MEAS - EF(MOD-SP4): 58.7 %
BH CV ECHO MEAS - ESV(MOD-SP2): 27 ML
BH CV ECHO MEAS - ESV(MOD-SP4): 19 ML
BH CV ECHO MEAS - IVS/LVPW: 1.14 CM
BH CV ECHO MEAS - IVSD: 0.96 CM
BH CV ECHO MEAS - LAT PEAK E' VEL: 10.9 CM/SEC
BH CV ECHO MEAS - LV DIASTOLIC VOL/BSA (35-75): 27.1 CM2
BH CV ECHO MEAS - LV MASS(C)D: 103.9 GRAMS
BH CV ECHO MEAS - LV MAX PG: 4.6 MMHG
BH CV ECHO MEAS - LV MEAN PG: 2.7 MMHG
BH CV ECHO MEAS - LV SYSTOLIC VOL/BSA (12-30): 11.2 CM2
BH CV ECHO MEAS - LV V1 MAX: 107.2 CM/SEC
BH CV ECHO MEAS - LV V1 VTI: 19 CM
BH CV ECHO MEAS - LVIDD: 3.9 CM
BH CV ECHO MEAS - LVIDS: 2.6 CM
BH CV ECHO MEAS - LVOT AREA: 3 CM2
BH CV ECHO MEAS - LVOT DIAM: 1.97 CM
BH CV ECHO MEAS - LVPWD: 0.84 CM
BH CV ECHO MEAS - MED PEAK E' VEL: 6.2 CM/SEC
BH CV ECHO MEAS - MR MAX PG: 102.4 MMHG
BH CV ECHO MEAS - MR MAX VEL: 506 CM/SEC
BH CV ECHO MEAS - MV DEC SLOPE: 2044 CM/SEC2
BH CV ECHO MEAS - MV DEC TIME: 0.1 SEC
BH CV ECHO MEAS - MV E MAX VEL: 143 CM/SEC
BH CV ECHO MEAS - MV MAX PG: 6.6 MMHG
BH CV ECHO MEAS - MV MEAN PG: 3.4 MMHG
BH CV ECHO MEAS - MV P1/2T: 18.5 MSEC
BH CV ECHO MEAS - MV V2 VTI: 18.8 CM
BH CV ECHO MEAS - MVA(P1/2T): 11.9 CM2
BH CV ECHO MEAS - MVA(VTI): 3.1 CM2
BH CV ECHO MEAS - PA V2 MAX: 67.9 CM/SEC
BH CV ECHO MEAS - PULM DIAS VEL: 40.8 CM/SEC
BH CV ECHO MEAS - PULM S/D: 0.62
BH CV ECHO MEAS - PULM SYS VEL: 25.4 CM/SEC
BH CV ECHO MEAS - QP/QS: 0.28
BH CV ECHO MEAS - RAP SYSTOLE: 3 MMHG
BH CV ECHO MEAS - RV MAX PG: 0.71 MMHG
BH CV ECHO MEAS - RV V1 MAX: 42.2 CM/SEC
BH CV ECHO MEAS - RV V1 VTI: 5.6 CM
BH CV ECHO MEAS - RVOT DIAM: 1.89 CM
BH CV ECHO MEAS - RVSP: 25 MMHG
BH CV ECHO MEAS - SV(LVOT): 57.8 ML
BH CV ECHO MEAS - SV(MOD-SP2): 48 ML
BH CV ECHO MEAS - SV(MOD-SP4): 27 ML
BH CV ECHO MEAS - SV(RVOT): 15.9 ML
BH CV ECHO MEAS - SVI(LVOT): 34.1 ML/M2
BH CV ECHO MEAS - SVI(MOD-SP2): 28.3 ML/M2
BH CV ECHO MEAS - SVI(MOD-SP4): 15.9 ML/M2
BH CV ECHO MEAS - TAPSE (>1.6): 1.8 CM
BH CV ECHO MEAS - TR MAX PG: 22.3 MMHG
BH CV ECHO MEAS - TR MAX VEL: 236.2 CM/SEC
BH CV ECHO MEASUREMENTS AVERAGE E/E' RATIO: 16.73
BH CV XLRA - RV BASE: 3.3 CM
BH CV XLRA - RV LENGTH: 5.7 CM
BH CV XLRA - RV MID: 2.15 CM
BH CV XLRA - TDI S': 10.9 CM/SEC
LEFT ATRIUM VOLUME INDEX: 58.2 ML/M2
LV EF BIPLANE MOD: 60 %
SINUS: 2.8 CM
STJ: 2.07 CM

## 2025-04-18 PROCEDURE — 93306 TTE W/DOPPLER COMPLETE: CPT

## 2025-04-18 PROCEDURE — 25510000001 PERFLUTREN 6.52 MG/ML SUSPENSION 2 ML VIAL: Performed by: NURSE PRACTITIONER

## 2025-04-18 RX ORDER — DILTIAZEM HYDROCHLORIDE 360 MG/1
360 CAPSULE, EXTENDED RELEASE ORAL DAILY
Qty: 90 CAPSULE | Refills: 3 | Status: SHIPPED | OUTPATIENT
Start: 2025-04-18

## 2025-04-18 RX ADMIN — PERFLUTREN 1 ML: 6.52 INJECTION, SUSPENSION INTRAVENOUS at 11:35

## 2025-04-18 NOTE — PROGRESS NOTES
Date of Office Visit: 2025  Encounter Provider: Wilder Zamudio MD  Place of Service: [facility] CARDIOLOGY  Patient Name: Berta Garay  : 1940    Subjective:     Encounter Date:2025      Patient ID: Berta Garay is a 84 y.o. female who has a cc of  complicated history and multiple ablations     Per VS note: 84 y.o. female who has followed with Dr. Joseph in the past and now with Dr. Zamudio --- persistent A-fib , TY/cardioversion in 2022----maintained sinus rhythm for about 5 months, recurred early November, she was symptomatic, intolerant to beta-blockers but able to tolerate calcium channel blockers.     She saw Dr. Zamudio, she had a PVI in 2023, she did have a TY prior to procedure which showed an EF of 66-70%, mild AI, moderate MR.     2023 follow-up, she felt good but she was in A-fib with RVR, heart rate was in the low 100s, she had a cardioversion on 2023.     She again had recurrence, redo ablation was recommended.     3/30/2023 Redo ablation---PV's were isolated, ablation outside of pul veins along the left atrial roof to create roofline block, ablation in posterior wall of the LA using box lesion set to isolate the LA posterior wall, successful redo ablation in the LA ridge between the appendage and pul vein that was isolated.  \  AF is not fixable and she is on rate control    She had Echo today -- huge LA and RA and normal hyperdynamic LV     TODAY -- she reports feeling good.   No anginal chest pain,   No sig leal,   No soa,   No fainting,  No orthostasis.   No edema.   Exercise tolerance: some walking, about 1.5 miles     There have been no hospital admission since the last visit.     There have been no bleeding events.       Past Medical History:   Diagnosis Date    Atrial fibrillation     Colon polyp     GERD (gastroesophageal reflux disease)     Hyperlipidemia     Hypertension        Social History     Socioeconomic  "History    Marital status:    Tobacco Use    Smoking status: Former     Current packs/day: 0.00     Types: Cigarettes     Quit date:      Years since quittin.3    Smokeless tobacco: Never    Tobacco comments:     caffeine - coffee, tea and soda   Vaping Use    Vaping status: Never Used   Substance and Sexual Activity    Alcohol use: Yes     Alcohol/week: 1.0 standard drink of alcohol     Types: 1 Glasses of wine per week     Comment: on occas.    Drug use: No    Sexual activity: Defer       Family History   Problem Relation Age of Onset    Hypertension Mother     Heart disease Father         cabg       Review of Systems   Constitutional: Negative for fever and night sweats.   HENT:  Negative for ear pain and stridor.    Eyes:  Negative for discharge and visual halos.   Cardiovascular:  Negative for cyanosis.   Respiratory:  Negative for hemoptysis and sputum production.    Hematologic/Lymphatic: Negative for adenopathy.   Skin:  Negative for nail changes and unusual hair distribution.   Musculoskeletal:  Negative for gout and joint swelling.   Gastrointestinal:  Negative for bowel incontinence and flatus.   Genitourinary:  Negative for dysuria and flank pain.   Neurological:  Negative for seizures and tremors.   Psychiatric/Behavioral:  Negative for altered mental status. The patient is not nervous/anxious.             Objective:     Vitals:    25 1140   BP: 120/84   BP Location: Right arm   Patient Position: Sitting   Pulse: 114   Weight: 64.9 kg (143 lb)   Height: 160 cm (63\")         Eyes:      General:         Right eye: No discharge.         Left eye: No discharge.   HENT:      Head: Normocephalic and atraumatic.   Neck:      Thyroid: No thyromegaly.      Vascular: No JVD.   Pulmonary:      Effort: Pulmonary effort is normal.      Breath sounds: Normal breath sounds. No rales.   Cardiovascular:      Normal rate. Irregularly irregular rhythm.      Murmurs: There is a grade 2/6 early " systolic murmur.      No gallop.    Edema:     Peripheral edema absent.   Abdominal:      General: Bowel sounds are normal.      Palpations: Abdomen is soft.      Tenderness: There is no abdominal tenderness.   Musculoskeletal: Normal range of motion.         General: No deformity. Skin:     General: Skin is warm and dry.      Findings: No erythema.   Neurological:      Mental Status: Alert and oriented to person, place, and time.      Motor: Normal muscle tone.   Psychiatric:         Behavior: Behavior normal.         Thought Content: Thought content normal.           ECG 12 Lead    Date/Time: 4/18/2025 12:14 PM  Performed by: Wilder Zamudio MD    Authorized by: Wilder Zamudio MD  Comparison: compared with previous ECG   Similar to previous ECG  Rhythm: atrial fibrillation          Lab Review:       Assessment:          Diagnosis Plan   1. H/O cardiac radiofrequency ablation--PVI 1/2023, redo 3/2023        2. Atrial fibrillation, persistent               Plan:     She feels good -- no symptoms, she normally runs a HR of 76     AF on -- on a-ban and dilt.     She really looks good. HR high here but EF is great and her HR is better at home

## 2025-04-23 ENCOUNTER — APPOINTMENT (OUTPATIENT)
Dept: WOUND CARE | Facility: HOSPITAL | Age: 85
End: 2025-04-23
Payer: MEDICARE

## 2025-04-23 PROCEDURE — G0463 HOSPITAL OUTPT CLINIC VISIT: HCPCS

## 2025-04-29 ENCOUNTER — TELEPHONE (OUTPATIENT)
Age: 85
End: 2025-04-29
Payer: MEDICARE

## 2025-04-29 NOTE — TELEPHONE ENCOUNTER
Requesting Cardiac Clearance for Excision of skin with squamous cell carcinoma and dorsal scar and possible flap coverage on 5/5/25 w/ Dr. Armando Dumont.     Anesthesia: Unknown    LOV w/ you on 4/18/25  FU w/ GAURAV Garcia on 10/22/25    Stroke Hx: No    Valve Hx: No    Anticoags: Eliquis 5mg    Please advise on Clearance and recommendations.    ALEX Moeller

## 2025-08-15 ENCOUNTER — OFFICE VISIT (OUTPATIENT)
Dept: ENDOCRINOLOGY | Age: 85
End: 2025-08-15
Payer: MEDICARE

## 2025-08-15 VITALS
OXYGEN SATURATION: 97 % | HEART RATE: 76 BPM | BODY MASS INDEX: 25.48 KG/M2 | WEIGHT: 143.8 LBS | SYSTOLIC BLOOD PRESSURE: 124 MMHG | HEIGHT: 63 IN | DIASTOLIC BLOOD PRESSURE: 80 MMHG

## 2025-08-15 DIAGNOSIS — M85.80 OSTEOPENIA, UNSPECIFIED LOCATION: Primary | ICD-10-CM

## (undated) DEVICE — JACKT LAB F/R KNIT CUFF/COLR XLG BLU

## (undated) DEVICE — SYS TRNSEP ACC BRK ACROSS A/ 71CM

## (undated) DEVICE — PINNACLE INTRODUCER SHEATH: Brand: PINNACLE

## (undated) DEVICE — Device

## (undated) DEVICE — LOU EP: Brand: MEDLINE INDUSTRIES, INC.

## (undated) DEVICE — SPNG GZ WOVN 4X4IN 12PLY 10/BX STRL

## (undated) DEVICE — HYBRID TUBING/CAP SET FOR OLYMPUS® SCOPES: Brand: ERBE

## (undated) DEVICE — PREF.GUIDING SHEATH W/MULT.CRV: Brand: PREFACE

## (undated) DEVICE — SUCTION CANISTER, 1000CC,SAFELINER: Brand: DEROYAL

## (undated) DEVICE — CLEARSIGHT FINGER CUFF MEDIUM MULTI PACK: Brand: CLEARSIGHT

## (undated) DEVICE — Device: Brand: LASSO NAV

## (undated) DEVICE — Device: Brand: DEFENDO AIR/WATER/SUCTION AND BIOPSY VALVE

## (undated) DEVICE — Device: Brand: REFERENCE PATCH CARTO 3

## (undated) DEVICE — FRCP BX RADJAW4 NDL 2.8 240CM LG OG BX40

## (undated) DEVICE — VIAL FORMALIN CAP 10P 40ML

## (undated) DEVICE — BW-412T DISP COMBO CLEANING BRUSH: Brand: SINGLE USE COMBINATION CLEANING BRUSH

## (undated) DEVICE — Device: Brand: WEBSTER CS

## (undated) DEVICE — GOWN ISOL W/THUMB UNIV BLU BX/15

## (undated) DEVICE — Device: Brand: THERMOCOOL SMARTTOUCH SF

## (undated) DEVICE — Device: Brand: SMARTABLATE

## (undated) DEVICE — MASK,FACE,FLUID RESIST,SHLD,EARLOOP: Brand: MEDLINE

## (undated) DEVICE — GLV SURG SENSICARE PI MIC PF SZ7.5 LF STRL

## (undated) DEVICE — KT ORCA ORCAPOD DISP STRL

## (undated) DEVICE — SUCTION CANISTER, 3000CC,SAFELINER: Brand: DEROYAL

## (undated) DEVICE — SYR LL 3CC

## (undated) DEVICE — ENDO. PORT CONNECTOR W/VALVE FOR OLYMPUS® SCOPES: Brand: ERBE

## (undated) DEVICE — Device: Brand: SOUNDSTAR

## (undated) DEVICE — SYS TRNSEP ACC ACROSS ADLT BRK1 71CM

## (undated) DEVICE — ADAPT CLN BIOGUARD AIR/H2O DISP

## (undated) DEVICE — GLV SURG SENSICARE MICRO PF LF 7.5 STRL

## (undated) DEVICE — OCTA,PERSEID,2-2-2-2-2,F-CURVE: Brand: OCTARAY MAPPING CATHETER

## (undated) DEVICE — BLANKT WARM UNDER/BDY FUL/ACC A/ 90X206CM

## (undated) DEVICE — RADIFOCUS GLIDEWIRE: Brand: GLIDEWIRE